# Patient Record
Sex: FEMALE | Race: WHITE | Employment: PART TIME | URBAN - METROPOLITAN AREA
[De-identification: names, ages, dates, MRNs, and addresses within clinical notes are randomized per-mention and may not be internally consistent; named-entity substitution may affect disease eponyms.]

---

## 2017-01-03 ENCOUNTER — DOCUMENTATION ONLY (OUTPATIENT)
Dept: BARIATRICS/WEIGHT MGMT | Age: 32
End: 2017-01-03

## 2017-01-03 ENCOUNTER — HOSPITAL ENCOUNTER (OUTPATIENT)
Dept: BARIATRICS/WEIGHT MGMT | Age: 32
Discharge: HOME | End: 2017-01-03

## 2017-01-03 NOTE — PROGRESS NOTES
[address] [place] [address] [facility], [address]    Patient's Name: Albania Rhoades   Age: 36 y.o. YOB: 1985   Sex: female    Date:   1/3/2017    Insurance:            Session: 1 of  3  Revision:   Surgeon:  Dr. Andrew Aguirre    Height: 5 f 9.5 Weight:    335      Lbs. BMI: 49.6   Pounds Lost since last month: 0               Pounds Gained since last month: 0    Starting Weight: 335   Previous Months Weight: 335  Overall Pounds Lost: 0 Overall Pounds Gained: 0      Do you smoke? None    Alcohol intake:  Number of drinks at a time:  None  Number of times a week: None    Class Guidelines    Patient understands that weight loss trial classes must be consecutive. Patient understands if they miss a class, it is their responsibility to contact me to reschedule class. Patient understands the expectations that weight maintenance/weight loss is expected during the classes. Failure to demonstrate changes may result in one extra month of weight loss trial, followed by going back to see the surgeon. Other Pertinent Information: Insurance does not require a WLT, but due to her history of smoking, she will be seeing me for 3 months. Eating Habits and Behaviors      Today we spent some time talking about the key diet principles. I have talked with patient about what their plate should be made up of. Their plate should be made up of 1/2 coming from non-starchy vegetables, 1/4 coming from lean meat, and 1/4 of their plate coming from carbohydrates, including fruits, starches, or milk. I have talked with patient about counting their carbohydrates and aiming between  grams per day or less than 30 grams of carbohydrates per meal.  We talked about working towards 64 ounces of fluid. Fluid choices should be non-caloric, non-carbonated, no caffeine. We also reviewed protein drinks that would be acceptable.       Patient's current diet habits include: 3 meals a day. States she is snacking on veggies and fruit. She states she eats bread, pasta, and chips on a weekly basis. She is drinking 1 can of soda per day. She states she does not eat cookies, ice cream, cake, donuts. She tends to skip meals, which was addressed and ideas for meals were discussed. Physical Activity/Exercise    Comments:  During class, I discussed with patient the importance of getting into an exercise routine. We talked about how strength training can help one's metabolism  Currently, patient is walking around neighborhood for activity. Goals have been set. Behavior Modification       Comments: We spent some time talking about behaviors. Poor behaviors can also lead to weight gain. These behaviors include watching TV while eating, skipping meals, and eating late. I have encouraged patient to keep a food journal to monitor their intake. Other Behavior Habits of patients: Grazing and late night eating      Being that it is a New Year, patient was shown a power point on assessing their readiness to change in class. Patient was then instructed to calculate their score to determine their level of readiness for charge. Patient was educated the importance of setting SMART goals. Goals should be specific, measurable, and attainable. They should also be realistic and time-orientated.     Patient was encouraged to set a food, behavior, and exercise goal.    Patient's goals are as followed:   Food-related goal:  Cut out cokes completely  Behavior-related goal: No night time junk meals  Exercise-related goal: Walk 30 minutes, 3 x a week      Natalie Colvin RD  1/3/2017

## 2017-02-01 ENCOUNTER — DOCUMENTATION ONLY (OUTPATIENT)
Dept: BARIATRICS/WEIGHT MGMT | Age: 32
End: 2017-02-01

## 2017-02-14 ENCOUNTER — DOCUMENTATION ONLY (OUTPATIENT)
Dept: BARIATRICS/WEIGHT MGMT | Age: 32
End: 2017-02-14

## 2017-02-14 NOTE — PROGRESS NOTES
2/14/17:  Patient did not show for her weight loss trial.  This is her 3rd no show since December. She was not contacted to be rescheduled.     Bruna Jaeger MS RD

## 2017-02-15 ENCOUNTER — DOCUMENTATION ONLY (OUTPATIENT)
Dept: BARIATRICS/WEIGHT MGMT | Age: 32
End: 2017-02-15

## 2017-02-15 NOTE — PROGRESS NOTES
2/15/17:  Patient did call to reschedule her missed appointment on 2/14/17. The call was left on the main line for me and wasn't transferred to me until the next day. Patient was not a no show for this visit.     Alaina Reyes MS RD

## 2017-02-24 ENCOUNTER — HOSPITAL ENCOUNTER (OUTPATIENT)
Dept: BARIATRICS/WEIGHT MGMT | Age: 32
Discharge: HOME | End: 2017-02-24

## 2017-02-24 ENCOUNTER — DOCUMENTATION ONLY (OUTPATIENT)
Dept: BARIATRICS/WEIGHT MGMT | Age: 32
End: 2017-02-24

## 2017-02-24 NOTE — PROGRESS NOTES
[address] [place] [address] [facility], [address]    Patient's Name: Iker Guerra   Age: 36 y.o. YOB: 1985   Sex: female    Date:   2/24/2017    Insurance:            Session: 2 of  3  Revision:   Surgeon:  Dr. Stephen Levy    Height: 5  f 9.5 Weight:    328      Lbs. BMI: 47.9   Pounds Lost since last month: 7               Pounds Gained since last month: 0    Starting Weight: 335   Previous Months Weight: 335  Overall Pounds Lost: 7 Overall Pounds Gained: 0      Do you smoke? None    Alcohol intake:  Number of drinks at a time:  None  Number of times a week: None    Class Guidelines    Patient understands that weight loss trial classes must be consecutive. Patient understands if they miss a class, it is their responsibility to contact me to reschedule class. Patient understands the expectations that weight maintenance/weight loss is expected during the classes. Failure to demonstrate changes may result in one extra month of weight loss trial, followed by going back to see the surgeon. Other Pertinent Information:     Changes Made since last month: Portion control and carbohydrate watching      Eating Habits and Behaviors      Today we spent some time talking about the key diet principles. Patient understands the importance of fluid post-op. I have encouraged patient to start working towards 64 ounces of fluid per day. Fluids should be non-carbonated, no caffeine, and no calorie drinks. Patient was recommended to keep their daily carbohydrates less than 100 grams per day. Patient was given examples of carbohydrate-based food. I also provided patient with a log and have encouraged patient to track their daily carbohydrate intake. In class, we talked about appropriate snacks. Snacks should be protein-based.   Patient was given a handout that had multiple types of fruits on it and indicated what the portion would be, which would be equal to 15-20 grams of carbohydrates based on that portion. Patient was also given a handout for nuts and how many nuts are equal to 100 calories. Discussed with patient that nuts can be an appropriate snack; however, 14 almonds or 11 cashews are equal to 100 calories, so it is important to practice portion control. We also discussed appropriate protein drinks. Patient understands that will be part of their diet after surgery and is encouraged to start trying these supplements. Patient's current diet habits include: 3 meals a day. States she does some of her snacking at work. She is eating bread 1 x a day and chips 1 x a week. She feels she is struggling most with snacking, but is working on making better choices. She is drinking 8 ounces of sweet tea a week, but is drinking a lot of caffeine and was encouraged to start weaning from this immediately. Physical Activity/Exercise    Comments:  During class, I discussed with patient the importance of getting into an exercise routine. We talked about how strength training can help one's metabolism  Currently, patient is toting feed bags up and down her driveway for activity. Goals have been set. Behavior Modification       Comments:   Reinforced to patient some of the behavior modifications that need to be made in order to be successful long term. Patient needs to plan ahead. Lack on planning leads to poor food choices, skipping meals, increase in fast food. I have discussed with patient carrying a small cooler or lunch box with them to help keep healthy snacks on hand. Patient was also encouraged to eat meals at a table and avoid eating in front of the TV. Behavior Habit: Patient states she used to do a lot of snacking in front of the TV, but has stopped that.     Renard Huber Tye 87 RD  2/24/2017

## 2017-03-06 ENCOUNTER — TELEPHONE (OUTPATIENT)
Dept: SURGERY | Age: 32
End: 2017-03-06

## 2017-03-14 ENCOUNTER — TELEPHONE (OUTPATIENT)
Dept: SURGERY | Age: 32
End: 2017-03-14

## 2017-03-14 NOTE — TELEPHONE ENCOUNTER
Called pt to see when she plans to get her labs and nicotine test done. Pt states she can not go before next Friday. She states she had been missing appointments because she was ill and just purchased a new home and simply has a lot going on. She rescheduled her 03/17/17 appointment to 04/05/17. She states she has an appointment with Hoa Hedrick coming up this month that she plans to keep.

## 2017-03-24 ENCOUNTER — TELEPHONE (OUTPATIENT)
Dept: SURGERY | Age: 32
End: 2017-03-24

## 2017-03-24 NOTE — TELEPHONE ENCOUNTER
Called pt to see why she didn't get her labs and nicotine test done today (see note from 03/14/17) She states she has strep throat her kids are sick as well and that she will not be going out in the weather while her and her family are sick. I asked her if she had a follow up appointment with Dr Lonny Salter. She said she didn't need to go back I explained to her she was pending and needed to call and make sure she didn't need a follow up. Then she remembered her other psychiatrist had to fax Dr. Lonny Salter something. She says she will work on it if she can but right now she is sick. She states she has an appointment with Andrews Burt next week that she doesn't plan on missing and she will get her lab work then.

## 2017-03-31 ENCOUNTER — DOCUMENTATION ONLY (OUTPATIENT)
Dept: BARIATRICS/WEIGHT MGMT | Age: 32
End: 2017-03-31

## 2017-03-31 NOTE — PROGRESS NOTES
3/31/17:  Patient contacted me to reschedule her nutrition appointment. She rescheduled last Friday because her children were sick and stated she has to head to [place] today for a family emergency. She has been rescheduled for April 21. She was a no show 3 x throughout the trial and rescheduled at least 3 x. Today's visit would have been her final weight loss trial visit.     Jose Milner MS RD

## 2017-04-21 ENCOUNTER — HOSPITAL ENCOUNTER (OUTPATIENT)
Dept: BARIATRICS/WEIGHT MGMT | Age: 32
Discharge: HOME | End: 2017-04-21

## 2017-04-21 ENCOUNTER — DOCUMENTATION ONLY (OUTPATIENT)
Dept: BARIATRICS/WEIGHT MGMT | Age: 32
End: 2017-04-21

## 2017-04-21 NOTE — PROGRESS NOTES
Nutrition Evaluation    Patient's Name: Mandy Mayers   Age: 36 y.o. YOB: 1985   Sex: female    Height: 5 f 9.5 Weight: 322 BMI:  46.9  Starting Weight:  335      Patient has completed a 3 month weight loss trial.  During the classes, we have focused on 3 components including diet, exercise, and behavior modifications. Upon completion of the weight loss trial, patient had a good understanding of the 3 components. The diet component of the weight loss trial emphasized on:   Label reading and keeping total fat and sugar less than 3 grams per serving    Proper portion sizes    Drinking 64 ounces of water per day   Cutting out simple sugar    The exercise component of the weight loss trial emphasized on:   The importance of getting into an exercise routine.  Ideas and goals for exercise were discussed with the patient. The behavior component of the weight loss trial emphasized on:   Taking 20-30 minutes to eat a meal.   Planning ahead to avoid making poor dietary choices.  Not eating in front of the TV or computer    Smoking Status:  Yes  Alcohol Intake:  Number of Drinks at a Time: None  Number of Times a Week: None    Changes made during classes include:  No soda  Watching her carbohydrate intake. Reading labels (althought there was some misunderstanding) and we spent some time on label reading. Two things that patient learned during this weight loss trial:  Label reading  Portion sizes    Summary:  I feel that Mandy Mayers has demonstrated appropriate diet changes and is ready to move forward with surgery. Patient has been briefed on the importance of the protein drinks, vitamins, and the transition of the diet stages. Patient understands that the long-term diet will focus on protein and vegetables. Patient understand the effects of carbohydrates after surgery and what reactive hypoglycemia is.       Patient is aware that they will be attending pre-op class 2 weeks before surgery and will get more detailed information on the post-op diet guidelines. Patient will see me again at 6 weeks post-op. Patient completed her 3 month weight loss trial.  She missed a few appointments and rescheduled several times. She stated that her  is deployed for 7 months, they just bought a house, and she's taking care of the two children on her own, which is why she has missed so many appointments. Patient does have a good understanding of post-op diet care. She did lose 13 pounds during her weight loss trial with me, so from a nutritional standpoint, I feel she is a good candidate; however, patient is still smoking. She stated she had quit, but with all the stress, she smoked on her way here today. I discussed the effects of smoking after surgery and told her that when she is ready to quit, she can go have her nicotine and other labs completed, but surgery will be delayed 3 months after she quits. Discussed with patient that due to all the stress she has going on right now, this may not be the best time for her to have surgery and she may want to consider getting things in order before moving forward.     Candidate for surgery: Pending (okay from a nutritional standpoint, pending due to smoking)  Re-evaluation Date:     Procedure:  Gastric Bypasss    Renard Miranda 87 RD  4/21/2017

## 2017-04-21 NOTE — PROGRESS NOTES
[address] [place] [address] [facility], [address]    Patient's Name: Jaclyn Rojas   Age: 36 y.o. YOB: 1985   Sex: female    Date:   4/21/2017    Insurance:            Session: 3 of  3  Revision:   Surgeon:  Dr. Bg Corley    Height: 5 f 9.5 Weight:    322      Lbs. BMI: 46.9   Pounds Lost since last month: 6               Pounds Gained since last month: 0    Starting Weight: 335   Previous Months Weight: 325  Overall Pounds Lost: 13 Overall Pounds Gained: 0      Do you smoke? Yes. Patient states she hasn't smoked in a while, but is stressed out and had a cigarette today. Alcohol intake:  Number of drinks at a time:  None  Number of times a week: None    Class Guidelines    Patient understands that weight loss trial classes must be consecutive. Patient understands if they miss a class, it is their responsibility to contact me to reschedule class. Patient understands the expectations that weight maintenance/weight loss is expected during the classes. Failure to demonstrate changes may result in one extra month of weight loss trial, followed by going back to see the surgeon. Other Pertinent Information:     Changes made to diet since last month: Patient states she has completely knocked out soda and fried food. She has cut back on her bread intake. Eating Habits and Behaviors    During class, we focused on the normal key diet principles. We talked about the importance of not drinking liquid calories and aiming for 64 ounces of water per day. We also talked about cutting out carbohydrates. I discussed with patient that the average American consumes 400-500 grams of carbohydrates per day. Patient was instructed to cut out carbohydrates and aim for 100 grams or less. Today's lesson focused a lot on label reading. I first gave patient a power point on label reading.   Throughout the power point, there were questions, such as how many calories are in 2 servings of a certain product. I then compared sugar free cookies to regular cookies for the patient. The point of this activity was to demonstrate to them that sugar free cookies and other sugar free products do not mean calorie-free or carbohydrate-free and these foods should be avoid. We also looked at a box of whole wheat pasta and although it is whole wheat, it still has 42 grams of carbohydrates per 2 ounces. I have reinforced to patient to cut out breads, rice, pasta, cereal, and crackers. Patient's current diet habits include: Patient states she is eating 3 meals a day. She states for breakfast she may do some fruit. Lunch is deli meat and fruit or vegetables. Zorita Star is some type of protein and vegetables. She states if she doesn't eat protein at dinner, she is hungry at 2 am.  If she has to get up at 2 am, she will have fruit or leftover chicken. She states she used to go and grab whatever she can get her hands on, hot dog, potato chips. She states she is trying to make better choices with this. We spent some time talking about her fruit intake and she feels that is something she will be able to stop and states she is not a big candy eater. Patient is drinking 100 ounces + of fluid. Patient states she has cut out the sweet tea and soda. Patient states she has been reading the labels and trying to focus more on low carbohydrates and higher protein choices. She states she has started taking the vitamins already to help get into the routine. Physical Activity/Exercise    Comments:  During class, I discussed with patient the importance of getting into an exercise routine. Patient was encouraged to purchase a pedometer to track steps. Patient is currently grabbing a 50 pound feed bag and walk up and down her driveway for activity. Behavior Modification       Comments: In class, we focused on behavior principles.   Many patients are not eating 3 meals a day. Some patients aren't eating 3 meals per day because of time, others are not eating it because they are not hungry. Talked with patient that breakfast stands for Break the Fast and it is essential that they get something on their system within 1 hour of waking up. Breakfast should focus on protein. This is also important to get the metabolism going. I have also talked to patient about limiting the places that they eat. All meals and snacks are encouraged to be consumed at a table.       Crow Pacheco MS RD  4/21/2017

## 2017-04-24 ENCOUNTER — TELEPHONE (OUTPATIENT)
Dept: SURGERY | Age: 32
End: 2017-04-24

## 2017-04-24 NOTE — TELEPHONE ENCOUNTER
Pt stated that she is very busy and she will call us back when she is able to schedule an appointment with Dr. Chinedu Caldwell.

## 2017-05-08 ENCOUNTER — HOSPITAL ENCOUNTER (OUTPATIENT)
Dept: LAB | Age: 32
Discharge: HOME | End: 2017-05-08
Payer: OTHER GOVERNMENT

## 2017-05-08 LAB
25(OH)D3 SERPL-MCNC: 21.9 NG/ML (ref 30–100)
ALBUMIN SERPL BCP-MCNC: 3.2 G/DL (ref 3.4–5)
ALBUMIN/GLOB SERPL: 1 {RATIO} (ref 0.8–1.7)
ALP SERPL-CCNC: 73 U/L (ref 45–117)
ALT SERPL-CCNC: 22 U/L (ref 13–56)
ANION GAP BLD CALC-SCNC: 6 MMOL/L (ref 3–18)
AST SERPL W P-5'-P-CCNC: 13 U/L (ref 15–37)
BASOPHILS # BLD AUTO: 0 K/UL (ref 0–0.1)
BASOPHILS # BLD: 0 % (ref 0–2)
BILIRUB SERPL-MCNC: 0.3 MG/DL (ref 0.2–1)
BUN SERPL-MCNC: 15 MG/DL (ref 7–18)
BUN/CREAT SERPL: 21 (ref 12–20)
CALCIUM SERPL-MCNC: 8.5 MG/DL (ref 8.5–10.1)
CHLORIDE SERPL-SCNC: 107 MMOL/L (ref 100–108)
CHOLEST SERPL-MCNC: 210 MG/DL
CO2 SERPL-SCNC: 27 MMOL/L (ref 21–32)
CREAT SERPL-MCNC: 0.7 MG/DL (ref 0.6–1.3)
DIFFERENTIAL METHOD BLD: NORMAL
EOSINOPHIL # BLD: 0.2 K/UL (ref 0–0.4)
EOSINOPHIL NFR BLD: 2 % (ref 0–5)
ERYTHROCYTE [DISTWIDTH] IN BLOOD BY AUTOMATED COUNT: 13.8 % (ref 11.6–14.5)
FERRITIN SERPL-MCNC: 67 NG/ML (ref 8–388)
FOLATE SERPL-MCNC: 11.3 NG/ML (ref 3.1–17.5)
GLOBULIN SER CALC-MCNC: 3.1 G/DL (ref 2–4)
GLUCOSE SERPL-MCNC: 100 MG/DL (ref 74–99)
HBA1C MFR BLD: 5.6 % (ref 4.2–5.6)
HCT VFR BLD AUTO: 38.8 % (ref 35–45)
HDLC SERPL-MCNC: 44 MG/DL (ref 40–60)
HDLC SERPL: 4.8 {RATIO} (ref 0–5)
HGB BLD-MCNC: 12.8 G/DL (ref 12–16)
IRON SERPL-MCNC: 72 UG/DL (ref 50–175)
LDLC SERPL CALC-MCNC: 136.4 MG/DL (ref 0–100)
LIPID PROFILE,FLP: ABNORMAL
LYMPHOCYTES # BLD AUTO: 32 % (ref 21–52)
LYMPHOCYTES # BLD: 2.8 K/UL (ref 0.9–3.6)
MCH RBC QN AUTO: 29.8 PG (ref 24–34)
MCHC RBC AUTO-ENTMCNC: 33 G/DL (ref 31–37)
MCV RBC AUTO: 90.2 FL (ref 74–97)
MONOCYTES # BLD: 0.4 K/UL (ref 0.05–1.2)
MONOCYTES NFR BLD AUTO: 5 % (ref 3–10)
NEUTS SEG # BLD: 5.4 K/UL (ref 1.8–8)
NEUTS SEG NFR BLD AUTO: 61 % (ref 40–73)
PLATELET # BLD AUTO: 277 K/UL (ref 135–420)
PMV BLD AUTO: 11.7 FL (ref 9.2–11.8)
POTASSIUM SERPL-SCNC: 4.1 MMOL/L (ref 3.5–5.5)
PROT SERPL-MCNC: 6.3 G/DL (ref 6.4–8.2)
RBC # BLD AUTO: 4.3 M/UL (ref 4.2–5.3)
SODIUM SERPL-SCNC: 140 MMOL/L (ref 136–145)
TRIGL SERPL-MCNC: 148 MG/DL (ref ?–150)
TSH SERPL DL<=0.05 MIU/L-ACNC: 1.23 UIU/ML (ref 0.36–3.74)
VIT B12 SERPL-MCNC: 296 PG/ML (ref 211–911)
VLDLC SERPL CALC-MCNC: 29.6 MG/DL
WBC # BLD AUTO: 8.8 K/UL (ref 4.6–13.2)

## 2017-05-08 PROCEDURE — 85025 COMPLETE CBC W/AUTO DIFF WBC: CPT | Performed by: SURGERY

## 2017-05-08 PROCEDURE — 82728 ASSAY OF FERRITIN: CPT | Performed by: SURGERY

## 2017-05-08 PROCEDURE — 84443 ASSAY THYROID STIM HORMONE: CPT | Performed by: SURGERY

## 2017-05-08 PROCEDURE — 83540 ASSAY OF IRON: CPT | Performed by: SURGERY

## 2017-05-08 PROCEDURE — 82607 VITAMIN B-12: CPT | Performed by: SURGERY

## 2017-05-08 PROCEDURE — 86677 HELICOBACTER PYLORI ANTIBODY: CPT | Performed by: SURGERY

## 2017-05-08 PROCEDURE — 80061 LIPID PANEL: CPT | Performed by: SURGERY

## 2017-05-08 PROCEDURE — 84425 ASSAY OF VITAMIN B-1: CPT | Performed by: SURGERY

## 2017-05-08 PROCEDURE — 80053 COMPREHEN METABOLIC PANEL: CPT | Performed by: SURGERY

## 2017-05-08 PROCEDURE — 80323 ALKALOIDS NOS: CPT | Performed by: SURGERY

## 2017-05-08 PROCEDURE — 83036 HEMOGLOBIN GLYCOSYLATED A1C: CPT | Performed by: SURGERY

## 2017-05-08 PROCEDURE — 82306 VITAMIN D 25 HYDROXY: CPT | Performed by: SURGERY

## 2017-05-08 PROCEDURE — 36415 COLL VENOUS BLD VENIPUNCTURE: CPT | Performed by: SURGERY

## 2017-05-09 LAB — H PYLORI IGG SER IA-ACNC: <0.9 U/ML (ref 0–0.8)

## 2017-05-10 LAB
H PYLORI IGM SER-ACNC: <9 UNITS (ref 0–8.9)
VIT B1 BLD-SCNC: 146.1 NMOL/L (ref 66.5–200)

## 2017-05-10 NOTE — PROGRESS NOTES
1. Add hypovitaminosis D to diagnoses  2. Rx 5000 units D3 daily  3.  Needs instruction on better protein nutrition

## 2017-05-11 ENCOUNTER — TELEPHONE (OUTPATIENT)
Dept: SURGERY | Age: 32
End: 2017-05-11

## 2017-05-11 LAB
COTININE SERPL-MCNC: NORMAL NG/ML
NICOTINE SERPL-MCNC: NORMAL NG/ML

## 2017-05-11 NOTE — PROGRESS NOTES
Spoke to patient regarding her Vitamin D level. Patient understood instructions to start 5000 IU per day. Talk to her about her protein/albumin levels. Patient was encouraged to incorporate more lean meat to her diet and she is going to start drinking the Premier shakes now.     Anika Cedeño MS RD

## 2017-06-02 ENCOUNTER — OFFICE VISIT (OUTPATIENT)
Dept: SURGERY | Age: 32
End: 2017-06-02

## 2017-06-02 VITALS
DIASTOLIC BLOOD PRESSURE: 78 MMHG | HEART RATE: 85 BPM | TEMPERATURE: 97.8 F | BODY MASS INDEX: 43.4 KG/M2 | RESPIRATION RATE: 20 BRPM | WEIGHT: 293 LBS | SYSTOLIC BLOOD PRESSURE: 136 MMHG | HEIGHT: 69 IN

## 2017-06-02 DIAGNOSIS — E66.01 MORBID OBESITY DUE TO EXCESS CALORIES (HCC): Primary | ICD-10-CM

## 2017-06-02 RX ORDER — GLUCOSAMINE SULFATE 1500 MG
5000 POWDER IN PACKET (EA) ORAL DAILY
COMMUNITY

## 2017-06-02 RX ORDER — ZOLPIDEM TARTRATE 10 MG/1
TABLET ORAL
Refills: 2 | COMMUNITY
Start: 2017-05-22 | End: 2017-07-12

## 2017-06-02 RX ORDER — METHYLPREDNISOLONE 8 MG/1
TABLET ORAL
Refills: 0 | COMMUNITY
Start: 2017-04-15 | End: 2017-06-02 | Stop reason: ALTCHOICE

## 2017-06-02 RX ORDER — LIDOCAINE HYDROCHLORIDE 20 MG/ML
SOLUTION ORAL; TOPICAL
COMMUNITY
Start: 2017-05-31 | End: 2017-06-02 | Stop reason: ALTCHOICE

## 2017-06-02 RX ORDER — VENLAFAXINE 75 MG/1
TABLET ORAL
Refills: 0 | COMMUNITY
Start: 2017-02-28 | End: 2017-06-02 | Stop reason: ALTCHOICE

## 2017-06-02 RX ORDER — ALBUTEROL SULFATE 90 UG/1
AEROSOL, METERED RESPIRATORY (INHALATION)
Refills: 11 | COMMUNITY
Start: 2017-03-24 | End: 2017-06-02 | Stop reason: ALTCHOICE

## 2017-06-02 RX ORDER — DIAZEPAM 5 MG/1
TABLET ORAL
COMMUNITY
Start: 2017-05-25 | End: 2017-06-02 | Stop reason: ALTCHOICE

## 2017-06-02 RX ORDER — NITROFURANTOIN 25; 75 MG/1; MG/1
CAPSULE ORAL
Refills: 0 | COMMUNITY
Start: 2017-05-08 | End: 2017-06-02 | Stop reason: ALTCHOICE

## 2017-06-02 RX ORDER — AZITHROMYCIN 250 MG/1
TABLET, FILM COATED ORAL
COMMUNITY
Start: 2017-05-30 | End: 2017-06-02 | Stop reason: ALTCHOICE

## 2017-06-02 RX ORDER — MONTELUKAST SODIUM 10 MG/1
TABLET ORAL
Refills: 11 | COMMUNITY
Start: 2017-03-24 | End: 2017-06-29 | Stop reason: ALTCHOICE

## 2017-06-02 RX ORDER — TRIAMCINOLONE ACETONIDE 1 MG/G
CREAM TOPICAL
Refills: 0 | COMMUNITY
Start: 2017-04-15 | End: 2017-06-02 | Stop reason: ALTCHOICE

## 2017-06-02 RX ORDER — FLUTICASONE PROPIONATE 50 MCG
SPRAY, SUSPENSION (ML) NASAL
Refills: 11 | COMMUNITY
Start: 2017-03-24 | End: 2017-06-29 | Stop reason: ALTCHOICE

## 2017-06-02 RX ORDER — HYDROCODONE POLISTIREX AND CHLORPHENIRAMINE POLISTIREX 10; 8 MG/5ML; MG/5ML
SUSPENSION, EXTENDED RELEASE ORAL
Refills: 0 | COMMUNITY
Start: 2017-02-27 | End: 2017-06-02 | Stop reason: ALTCHOICE

## 2017-06-02 RX ORDER — ALPRAZOLAM 2 MG/1
TABLET ORAL
Refills: 2 | COMMUNITY
Start: 2017-05-22

## 2017-06-02 RX ORDER — TRAMADOL HYDROCHLORIDE 50 MG/1
TABLET ORAL
COMMUNITY
Start: 2017-05-25 | End: 2017-06-02 | Stop reason: ALTCHOICE

## 2017-06-02 RX ORDER — NAPROXEN 500 MG/1
TABLET ORAL
Refills: 0 | COMMUNITY
Start: 2017-04-12 | End: 2017-06-02 | Stop reason: ALTCHOICE

## 2017-06-02 RX ORDER — CETIRIZINE HCL 10 MG
TABLET ORAL
Refills: 11 | COMMUNITY
Start: 2017-03-24 | End: 2017-06-28 | Stop reason: CLARIF

## 2017-06-02 RX ORDER — BUPROPION HYDROCHLORIDE 150 MG/1
TABLET, EXTENDED RELEASE ORAL
Refills: 2 | COMMUNITY
Start: 2017-05-22 | End: 2017-06-02 | Stop reason: ALTCHOICE

## 2017-06-02 RX ORDER — VENLAFAXINE HYDROCHLORIDE 225 MG/1
TABLET, EXTENDED RELEASE ORAL
Refills: 2 | COMMUNITY
Start: 2017-05-22 | End: 2017-06-29 | Stop reason: ALTCHOICE

## 2017-06-02 NOTE — PROGRESS NOTES
Atanacio Pallas is a 36 y.o. female who presents today with   Chief Complaint   Patient presents with    Morbid Obesity     Pt presents today to discuss surgical options        Body mass index is 47.26 kg/(m^2). 1. Have you been to the ER, urgent care clinic since your last visit? Hospitalized since your last visit? No    2. Have you seen or consulted any other health care providers outside of the [address] [place] [address] since your last visit? Include any pap smears or colon screening.  No

## 2017-06-02 NOTE — PROGRESS NOTES
Bariatric Midtrial   Returns during WLT to discuss status. Having no issues. Making positive changes. Still wants a bypass. She had resumed smoking in April but stopped the first week in April. She has completed her WLT. She lost 15 lbs on the WLT. Past Medical History:   Diagnosis Date    Anxiety     Ill-defined condition     HPV    Plantar fasciitis     Spondylosis     UTI (urinary tract infection)      Past Surgical History:   Procedure Laterality Date    HX APPENDECTOMY      HX  SECTION      HX LIPOMA RESECTION      HX SHOULDER ARTHROSCOPY      cartilage repair    HX TONSILLECTOMY       Allergies   Allergen Reactions    Chantix [Varenicline] Other (comments)     syncope     Current Outpatient Prescriptions   Medication Sig Dispense Refill    ALPRAZolam (XANAX) 2 mg tablet TK 1 T PO BID PRF ANXIETY  2    cetirizine (ZYRTEC) 10 mg tablet TK 1 T PO QD  11    fluticasone (FLONASE) 50 mcg/actuation nasal spray SHAKE LQ AND U 1 SPR IEN D  11    montelukast (SINGULAIR) 10 mg tablet TK 1 T PO D FOR ALLERGIES  11    venlafaxine 225 mg tr24 TK 1 T PO QD  2    zolpidem (AMBIEN) 10 mg tablet TK 1 T PO QHS PRF SLEEP  2    cholecalciferol (VITAMIN D3) 1,000 unit cap Take  by mouth daily.  gabapentin (NEURONTIN) 800 mg tablet Take  by mouth three (3) times daily.        Social History     Social History    Marital status: UNKNOWN     Spouse name: N/A    Number of children: N/A    Years of education: N/A     Social History Main Topics    Smoking status: Current Every Day Smoker     Packs/day: 0.25     Types: Cigarettes    Smokeless tobacco: None    Alcohol use Yes      Comment: rarely    Drug use: No    Sexual activity: Yes     Partners: Male     Birth control/ protection: None     Other Topics Concern    None     Social History Narrative     Family History   Problem Relation Age of Onset    No Known Problems Mother     Diabetes Father     Hypertension Father      Family Status Relation Status    Mother Alive    Father Alive       No change in ROS     Visit Vitals    /78 (BP 1 Location: Right arm, BP Patient Position: At rest)    Pulse 85    Temp 97.8 °F (36.6 °C) (Oral)    Resp 20    Ht 5' 9\" (1.753 m)    Wt 145.2 kg (320 lb)    BMI 47.26 kg/m2       Pulm: CTA   CV: RRR   Abd: soft, nontender, easily palp costal margin and  no hernias     Labs: low vit D and low albumin treated   EKG - NSR  Psych- PENDING  Nutr - 3/3 complete  Imp:   Doing well except a need to remain tobacco free for 3 months. Repeat nicotine end of June, obtain clearance from psych and plan for surgery in July.

## 2017-06-02 NOTE — MR AVS SNAPSHOT
Visit Information Date & Time Provider Department Dept. Phone Encounter #  
 6/2/2017 11:15 AM MD [facility] [phone] [id number] Your Appointments 6/29/2017  9:45 AM  
PRE OP with MD [facility]) Appt Note: pre op visit pt will schedule at this visit [address] [facility] [place] [address] [id number] [address] 851 [facility] 200 [facility] Upcoming Health Maintenance Date Due Pneumococcal 19-64 Medium Risk (1 of 1 - PPSV23) 8/6/2006 DTaP/Tdap/Td series (1 - Tdap) 8/6/2008 PAP AKA CERVICAL CYTOLOGY 8/6/2008 INFLUENZA AGE 9 TO ADULT 8/1/2017 Allergies as of 6/2/2017  Review Complete On: 6/2/2017 By: Na Vu Severity Noted Reaction Type Reactions Chantix [Varenicline]  01/10/2015    Other (comments)  
 syncope Current Immunizations  Never Reviewed No immunizations on file. Not reviewed this visit You Were Diagnosed With   
  
 Codes Comments Morbid obesity due to excess calories ([facility]    -  Primary ICD-10-CM: E66.01 
ICD-9-CM: 278.01   
 BMI 45.0-49.9, adult (HCC)     ICD-10-CM: Q72.10 
ICD-9-CM: V85.42 Vitals BP Pulse Temp Resp Height(growth percentile) Weight(growth percentile) 136/78 (BP 1 Location: Right arm, BP Patient Position: At rest) 85 97.8 °F (36.6 °C) (Oral) 20 5' 9\" (1.753 m) 320 lb (145.2 kg) BMI OB Status Smoking Status 47.26 kg/m2 Implant Current Every Day Smoker BMI and BSA Data Body Mass Index Body Surface Area  
 47.26 kg/m 2 2.66 m 2 Your Updated Medication List  
  
   
This list is accurate as of: 6/2/17 11:44 AM.  Always use your most recent med list.  
  
  
  
  
 ALPRAZolam 2 mg tablet Commonly known as:  Kristyn BAIRD 1 T PO BID PRF ANXIETY  
  
 cetirizine 10 mg tablet Commonly known as: ZYRTEC TK 1 T PO QD  
  
 fluticasone 50 mcg/actuation nasal spray Commonly known as:  Rachel Evans SHAKE LQ AND U 1 SPR IEN D  
  
 gabapentin 800 mg tablet Commonly known as:  NEURONTIN Take  by mouth three (3) times daily. montelukast 10 mg tablet Commonly known as:  SINGULAIR TK 1 T PO D FOR ALLERGIES  
  
 venlafaxine 225 mg Tr24 TK 1 T PO QD  
  
 VITAMIN D3 1,000 unit Cap Generic drug:  cholecalciferol Take  by mouth daily. zolpidem 10 mg tablet Commonly known as:  AMBIEN  
TK 1 T PO QHS PRF SLEEP To-Do List   
 06/26/2017 Lab:  NICOTINE AND METABOLITE, QT SERUM, PLASMA, WHOLE BLOOD Introducing [facility]! Priyank Bowen introduces [employer]form[employer] patient portal. Now you can access parts of your medical record, email your doctor's office, and request medication refills online. 1. In your internet browser, go to [email or web address] 2. Click on the First Time User? Click Here link in the Sign In box. You will see the New Member Sign Up page. 3. Enter your Conformity Access Code exactly as it appears below. You will not need to use this code after youve completed the sign-up process. If you do not sign up before the expiration date, you must request a new code. · [employer] Access Code: [email or web address] Expires: 8/6/2017  1:04 PM 
 
4. Enter the last four digits of your Social Security Number (xxxx) and Date of Birth (mm/dd/yyyy) as indicated and click Submit. You will be taken to the next sign-up page. 5. Create a Conformity ID. This will be your Conformity login ID and cannot be changed, so think of one that is secure and easy to remember. 6. Create a Conformity password. You can change your password at any time. 7. Enter your Password Reset Question and Answer. This can be used at a later time if you forget your password. 8. Enter your e-mail address. You will receive e-mail notification when new information is available in [address] 9. Click Sign Up. You can now view and download portions of your medical record. 10. Click the Download Summary menu link to download a portable copy of your medical information. If you have questions, please visit the Frequently Asked Questions section of the [employer] website. Remember, [employer] is NOT to be used for urgent needs. For medical emergencies, dial 911. Now available from your iPhone and Android! Please provide this summary of care documentation to your next provider. Your primary care clinician is listed as AAKASH WARREN. If you have any questions after today's visit, please call [phone].

## 2017-06-02 NOTE — LETTER
2017 11:37 AM 
 
Patient:  Jesica Whitt YOB: 1985 Date of Visit: 2017 Filipe Moreno MD 
[facility] [place] [address] VIA Facsimile: [phone] Dear Filipe Moreno MD, Thank you for referring Ms. Jesica Whitt to [place] [address] for evaluation and treatment. Below are the relevant portions of my assessment and plan of care. Bariatric Midtrial  
Returns during WLT to discuss status. Having no issues. Making positive changes. Still wants a bypass. She had resumed smoking in April but stopped the first week in April. She has completed her WLT. She lost 15 lbs on the WLT. Past Medical History:  
Diagnosis Date  Anxiety  Ill-defined condition HPV  Plantar fasciitis  Spondylosis  UTI (urinary tract infection) Past Surgical History:  
Procedure Laterality Date  HX APPENDECTOMY  HX  SECTION    
 HX LIPOMA RESECTION    
 HX SHOULDER ARTHROSCOPY    
 cartilage repair  HX TONSILLECTOMY Allergies Allergen Reactions  Chantix [Varenicline] Other (comments)  
  syncope Current Outpatient Prescriptions Medication Sig Dispense Refill  ALPRAZolam (XANAX) 2 mg tablet TK 1 T PO BID PRF ANXIETY  2  
 cetirizine (ZYRTEC) 10 mg tablet TK 1 T PO QD  11  
 fluticasone (FLONASE) 50 mcg/actuation nasal spray SHAKE LQ AND U 1 SPR IEN D  11  
 montelukast (SINGULAIR) 10 mg tablet TK 1 T PO D FOR ALLERGIES  11  
 venlafaxine 225 mg tr24 TK 1 T PO QD  2  
 zolpidem (AMBIEN) 10 mg tablet TK 1 T PO QHS PRF SLEEP  2  
 cholecalciferol (VITAMIN D3) 1,000 unit cap Take  by mouth daily.  gabapentin (NEURONTIN) 800 mg tablet Take  by mouth three (3) times daily. Social History Social History  Marital status: UNKNOWN Spouse name: N/A  
 Number of children: N/A  
 Years of education: N/A Social History Main Topics  Smoking status: Current Every Day Smoker Packs/day: 0.25 Types: Cigarettes  Smokeless tobacco: None  Alcohol use Yes Comment: rarely  Drug use: No  
 Sexual activity: Yes  
  Partners: Male Birth control/ protection: None Other Topics Concern  None Social History Narrative Family History Problem Relation Age of Onset  No Known Problems Mother  Diabetes Father  Hypertension Father Family Status Relation Status  Mother Alive  Father Alive No change in ROS Visit Vitals  /78 (BP 1 Location: Right arm, BP Patient Position: At rest)  Pulse 85  Temp 97.8 °F (36.6 °C) (Oral)  Resp 20  
 Ht 5' 9\" (1.753 m)  Wt 145.2 kg (320 lb)  BMI 47.26 kg/m2 Pulm: CTA  
CV: RRR Abd: soft, nontender, easily palp costal margin and  no hernias Labs: low vit D and low albumin treated EKG - NSR Psych- PENDING Nutr - 3/3 complete Imp:  
Doing well except a need to remain tobacco free for 3 months. Repeat nicotine end of June, obtain clearance from psych and plan for surgery in July. Thank you very much for your referral of Ms. Ananth Ulloa. If you have questions, please do not hesitate to call me. I look forward to following Ms. Sandor Velez along with you and will keep you updated as to her progress.   
 
 
 
 
Sincerely, 
 
 
Varun Kelley MD

## 2017-06-26 ENCOUNTER — HOSPITAL ENCOUNTER (OUTPATIENT)
Dept: LAB | Age: 32
Discharge: HOME | End: 2017-06-26
Payer: OTHER GOVERNMENT

## 2017-06-26 DIAGNOSIS — E66.01 MORBID OBESITY DUE TO EXCESS CALORIES (HCC): ICD-10-CM

## 2017-06-26 PROCEDURE — 36415 COLL VENOUS BLD VENIPUNCTURE: CPT | Performed by: SURGERY

## 2017-06-26 PROCEDURE — 80323 ALKALOIDS NOS: CPT | Performed by: SURGERY

## 2017-06-27 ENCOUNTER — DOCUMENTATION ONLY (OUTPATIENT)
Dept: SURGERY | Age: 32
End: 2017-06-27

## 2017-06-27 NOTE — PROGRESS NOTES
7 day Pre-Op LIQUID Diet    Benefits:  o Reducing intake before surgery will shrink your liver by  depleting glycogen. (a form of stored energy)  o Reduced liver size gives better access to stomach during  surgery; which translates to a safer surgery. o Prevents the \"last supper\" syndrome  o Experiencing weight loss before the procedure encourages  post-operative compliance and \"jump-starts\" weight loss    Specifics:  o Start 7 days before surgery:  ___7/4/17_________  o NO SOLID FOODS!!  o Your surgery will be CANCELED if this diet is not followed!!!  o Minimum of 64oz. of fluid daily, including protein drinks.  o No added sugar or carbonated beverages  o Continue to take all your prescribed medication and your vitamin supplements during this preoperative diet phase. CLEAR LIQUIDS:   Water   Sugar free, non-carbonated beverages (crystal light, propel)   Sugar free popsicles   Sugar free Jell-O   Fat free, reduced sodium broth    Decaffeinated coffee or decaffeinated tea with artificial sweeteners. PROTEIN:   60 grams of protein daily (in liquid supplements)   Pre-made protein drinks   Protein powder added to water    3 gram rule: limit sugar and fat to less than 3 grams per 8oz.  4-6 oz. low fat/low sugar yogurt OR cottage cheese 3-4 times during the week      [facility] Gastric Bypass and Sleeve Dietary Progression  Patient Name:   Date of Surgery: 7/4/17     Ice Chips start once admitted on floor. Begin Bariatric Clear Liquid Diet on: 7/5/17    Clear Liquid Diet: 64 oz. of fluid per day  o Low calorie, low sugar, non-carbonated beverages  - Water, Crystal Light, Propel Water, Sugar Free Jell-O, Sugar Free Popsicles, Bouillon  - Start protein supplement during this stage. (60-70 grams per day)  - Getting your fluid in and staying hydrated is your #1 priority! - The clear liquid diet will last for 7 days.       Begin Bariatric Soft and Moist on: 7/12/17  - This stage of the diet will last for 5 week, unless otherwise instructed by your surgeon. - Begin:  1 week post-op   - End:  6 weeks post-op (or when you follow up with the Registered Dietitian)    - Soft, moist, high protein foods: 3 meals per day plus protein supplements. o   Portions should emphasize on soft protein. o Portions will be a MAXIMUM of 1 ounce for solid food and 2-3 ounces for cottage cheese and yogurt. o Protein supplements should be between meals and provide 30-40 grams per day during soft protein diet. o Continue to get 64 ounces of fluid in per day. - Protein foods that would be ok on the Soft and Moist Diet are:  o Slow transition:  o 1st week on soft protein should focus on: Yogurt, cottage cheese, eggs  o 2nd -4th  week on soft protein diet should focus on: yogurt, cottage cheese, eggs, canned tuna, canned chicken, tilapia, fish (needs to be soft enough to be cut up with a fork)  o 5th week on soft protein diet should focus on: Yogurt, cottage cheese, eggs, canned tuna, canned chicken, tilapia, fish, salmon, chicken breast, or turkey. Remember to continue to get 64 ounces of fluid daily on ALL Stages. To be advanced to Bariatric Maintenance Stage of the bariatric diet, follow up with the dietitian 6 weeks post-op, around:        [address]  1. How many ounces of fluid will you need to drink every day after surgery? _______________    2. List 3 examples of bariatric clear liquids. ________________________  ________________________  ________________________  3. What is the 3 gram rule?   ______________________________________________________________      4. What is the 30 minute rule?  ______________________________________________________________      5. What are examples of food you will be able to eat on the bariatric soft and moist diet?  _________________________  _________________________  _________________________  6. After surgery I will be able to use a straw.   TRUE FALSE    7. After surgery I will NOT be able to drink carbonated beverages. TRUE    FALSE  8. After surgery I will be able to drink caffeine. TRUE   FALSE    9. What 4 vitamins will you take after surgery?  ______________________           _________________________  _____________________           _________________________  10. How much exercise should you get daily? _________________    11. How long should it take you to eat a meal? ________________    12. The Calcium I have purchased is: ______________________. This has ________ mg per serving. I will need to take this ________ times a day. 13. The protein drink I have decided on is: ______________. This has _________ grams of protein. I will need to drink ______ of my protein drinks during the full liquid phase and _____ during the soft protein phase. My protein drinks will give me ______ ounces of fluid. 14. After having a sleeve gastrectomy I will not be able to take NSAIDs (Non-Steroidal Anti-inflammatory Drugs) for ______ weeks. 15. After having a gastric bypass I will not be able to take NSAIDs (Non-Steroidal Anti-Inflammatory Drugs) for _____________. Pre-op instructions:  1. Shower with the antibacterial soap provided the 3 days prior to surgery. See instructions for details. 2. Pre-admission testing will contact you 1 week before surgery  3. [facility] will contact you the day before surgery to confirm your surgery time.  Email or call your surgery scheduler if you have not heard from them by 3pm  4. Nothing to eat or drink (NPO) after midnight the night before surgery.  Take any evening medications by 11pm  5. Wear comfortable clothing. 6. Do not bring any valuables. 7. Bring insurance card, prescription card, photo ID and credit card to the hospital.  **Discuss all home medications with your surgeon at you pre-op appointment.   Your surgeon will inform you of what medications to stop before surgery and what medications to take the day of surgery. **STOP all NSAIDS (Ibuprofen, Aleve, Advil, Naprosyn etc.) and Aspirin 7 days before your surgery      Important Information:  1. No lifting over 15 lbs. for 6 weeks post-op  2. No driving for 8-73 days after surgery - must be off pain medication. 3. No smoking EVER again! 4. You will NOT be able to take any Non-Steroidal Anti-inflammatories (NSAIDS), Aspirin or Steroids  a. Bypass/revision patients - EVER again. (Tylenol only)  b. Sleeve patients - 6 weeks after surgery  5. Pulse/temperature- twice daily for 2 weeks post-op   6. Avoid pregnancy for 18 months  7. Key Diet principles:  a. Vitamin/mineral supplements-Geronimo Complete, Calcium citrate, Vitamin D3, Vitamin B12  b.  Protein supplements - 60-70 grams/day  c. Minimum 64 oz. fluid per day      What to Expect in the Hospital:  Pre-op area:  o 2nd floor   o Arrive 2 hours before surgery  o Lovenox (blood thinner)  o Incentive Spirometer  o SCDs  o Sign consent  o Anesthesia  Start IV    Operating Room:    o Gastric bypass: 2- 2 ½ hours  o Sleeve gastrectomy: 1-1 ½ hours  o Revision: 2-3 hours    PACU(Post Anesthesia Care Unit):  o Nasal cannula  o EKG monitor  o Blood pressure cuff  o Pulse Ox  o Hernadez Catheter  o SCDs  o No family allowed  o Minimum one hour      5 [place] (Day of Surgery):  o Private room  o 1-2 oz. ice chips per hour (except Chastanet patients-mouth swabs only)  o IV Dilaudid  or liquid pain medication as needed for pain  o Discontinue hernadez catheter  o Walk in the evening  o One family member can spend the night (must 18 years or older)  o Cell phone/computers - OK    5 [place] (Post-op Day 1/Post-op Day 2):  o 7am - Gastrograffin swallow study (X-ray) for:  o All sleeve gastrectomy patients  o All Dr. Shaan Rooney patients  o All revision patients   o Discontinue -nasal cannula and heart rate monitor  o Start bariatric clear liquid diet - water, crystal light, broth, Jell-O and protein supplement  o Slowly increase to 3 oz. clear liquids and 1 oz. protein supplement per hour  o Oral pain medication as needed for pain - communicate with your nurse  o Goal:  48oz. liquid per day  o Discharge instructions/Lovenox self-injection instructions (if prescribed)  o WALK!!    Post-op Goals:  1. Void within 8 hours of your catheter being removed  2. Pain is well controlled with oral pain medication  3. Nausea is under control/No vomiting  4. Tolerating 3 oz. of clear liquids and 1 oz. protein supplement per hour for 3 consecutive hours. Post-op Follow-up Labs will need to be completed 1-2 weeks BEFORE your 3 month, 6 month and yearly visits. These labs are required and your appointment will be rescheduled if they are not completed. My surgical procedure and post-operative hospital stay has been discussed with me and I have been given the opportunity to ask any questions I may have. Patient Signature: ____________________________  Date: ________    Following Gastric Bypass surgery you will no longer be able to take NSAIDs (Non-Steriodal Anti-Inflammatory Drugs) EVER again. NSAIDs are the leading cause of stomach ulcers following Gastric Bypass surgery. (Patients having the Gastric Sleeve will not be able to take NSAIDs for 6 weeks following surgery.)      MOST COMMONLY TAKEN    NSAIDs    to be AVOIDED!! 1. Ibuprofen  2. Advil  3. Motrin  4. Excedrin  5. Aspirin  6. Celebrex  7. Naproxen  8. Aleve  9. Voltaren  10. Mobic    Attached is an extensive list of additional NSAIDs that cannot be taken following surgery. Please be sure to review this list when you are being prescribed new medications. This list covers the majority of NSAIDs on the market. Be aware that additional NSAIDs do exist and new medications are being introduced regularly. You must be your own advocate!! Non-Steriodal Anti-Inflammatory Drugs (NSAIDs)  ?  The following is a list of NSAIDS that are NEVER to be taken again after Gastric Bypass surgery    Ibuprofen which is also known as : Advil, Advil Childrens, Advil Christ Strength, Advil Liquigel, Advil Migraine, Advil Pediatric, Childrens Ibuprofen Berry, Genpril, IBU, Midol IB, Midol Maximum Strength Cramp Formula, Dolgesic, Motrin Childrens, Motrin IB, Motrin Infant Drops, Motrin Christ Strength, Motrin Migraine Pain, Nuprin, Migraine Liqui-gels, Ibu-Tab 200, Cap-Profen, Tab-Profen, Profen, Ibuprohm, Childrens Elixsure, IB Pro, Vicoprofen, Combunox, A-G Profen, Actiprofen, Addaprin, Advil Infants Concentrated Drops, Caldolor, [place], Q-Profen, Ibifon 600, Ibren, Xavier, Midol Cramps & Bodyaches, [place], Valery, Dawn [place], [place], Uni-Pro, Wal-Profen    Aspirin which has the brand name: Arthritis Pain, Aspergum, Aspir-Low, Aspirin Lite Coat, Crystal Aspirin, Bufferin, Easprin, Ecotrin, Empirin, Fasprin, [place], Halfprin, [place]as Aspirin, [place] Aspirin, Excedrin    Ketoprofen which is known as: Orudis KT, Oruvail, Actron. Sulindac which is sold as: Clinoril. Naproxen is one of the most common NSAIDs, and is sold as: Aleve, Naprosyn, EC-Naprosyn, Naprelan, Anaprox, All Day Pain Relief, Aflaxen, All Day Relief, Anaprox-DS, Comfort Pac  With Naproxen,  Aleve Caplet, Aleve Easy Open Arthritis, Aleve Gelcap,  Anaprox-DS, EC-Naprosyn, Leader Naproxen Sodium, Midol Extended Relief, Naprelan 375?, Naprelan 500?, Naprelan 750?, Prevacid NapraPac 375,  Prevacid NapraPac, Naprapac, Vimovo. Etodolac is sold under the brand name: Lodine XL, Lodine. Fenoprofen can be found on the market as: Nalfon, Nalfon 200. Diclofenac sold as: Arthrotec, Cataflam, Voltaren, Cambia, Voltaren-XR, Zipsor. Flurbiprofen sold as: Asaid. Ketorolac is sold under the brand name: Sprix, Toradol, Toradol IM, Toradol IV/IM. Piroxicam is found on the market as: Feldene. Indomethacin known as: Indocin SR, Indocin, Indo-Rich, Indomethegan, Indocin IV.     Mefenamic Acid sold as: Ponstel. Meloxicam is sold under the brand name: Mobic    Nabumetone which is sold as: Relafen. Oxaprozin which has the brand name: Daypro    Ketoprofen which has the brand name: Actron, Orudis KT, Orudis, Oruvail    Famotidine and Ibuprofen can be found as: Duexis    Meclofenamate sold as: Meclomen    Tolmetin which can be found on the marked as: Tolectin, Tolectin 600, Tolectin DS    Salsalate which is sold as: Disalcid. Celecoxib which is sold as: Celebrex    PRE-OP CHECKLIST    THINGS TO DO AT MY PRE-OP APPOINTMENT WITH MY SURGEON:  ? Discuss ALL my current medications with my surgeon. Know what medications needs to be stopped before surgery AND what medications need to be taken on the morning of surgery. ? blood pressure/diuretic medications. ? Coumadin, Plavix or other blood thinners    ? Stop the following diabetic medications (if applicable): ______________n/a______________________________________on: ______________  ? Use Regular Insulin (Novolog Pen) according to the following sliding scale: Insulin Sliding Scale  Blood sugar:  Amount of insulin:  Under 150  no insulin  150-200  2 units  201-250  4 units  251-300  6 units  301-350  8 units  351-400  10 units  400 or greater 12 units and call physician [phone]    THINGS TO DO FOLLOWING the PRE-OP CLASS:  ? Read through all information given to me by:  ? My dietitian Angie Hwang or [place])  ? Daksha/Patricia at the Pre-op class    ? Contact my insurance company to find out the out of pocket cost of Lovenox (enoxaparin). $____________      THINGS TO DO BEFORE SURGERY:    ? Start the pre-op liquid diet on: ___7/4/17_______    ? Stop all NSAIDS (see attached sheet with list of NSAIDs) and Aspirin 7 days before my surgery: __7/4/17_________  ? Contact my doctor(PCP or surgeon) regarding stopping Coumadin, Plavix or other blood thinners    ?  Purchase bariatric clear liquids (Crystal Lite, sugar free Jell-O, broth, sugar free popsicles, protein supplement) and bariatric soft and moist foods (low fat yogurt, cottage cheese, eggs, tuna, fish, chicken) so that Im prepared when I get home from the hospital.     ? Purchase all vitamins that will be required following surgery. 1. Chewable multivitamin - 2 per day(ex: Flintstones)  2. Calcium Citrate - 1500mg (500mg 3 times a day)  3. Vitamin B12 - 1000mcg daily  4. Vitamin D3 - 5000IU daily    ? Create a system to keep track of how many ounces of fluid I am drinking daily. ? Post-op GOAL: 64oz per day    ? Purchase a protein supplement that I like:  ? Brand: ______________    ? Ounces: __________   ?  Grams of protein per serving: _________

## 2017-06-29 ENCOUNTER — OFFICE VISIT (OUTPATIENT)
Dept: SURGERY | Age: 32
End: 2017-06-29

## 2017-06-29 VITALS
HEART RATE: 79 BPM | HEIGHT: 71 IN | DIASTOLIC BLOOD PRESSURE: 86 MMHG | RESPIRATION RATE: 16 BRPM | SYSTOLIC BLOOD PRESSURE: 124 MMHG | TEMPERATURE: 98.2 F | WEIGHT: 293 LBS | BODY MASS INDEX: 41.02 KG/M2 | OXYGEN SATURATION: 98 %

## 2017-06-29 DIAGNOSIS — E66.01 MORBID OBESITY DUE TO EXCESS CALORIES (HCC): Primary | ICD-10-CM

## 2017-06-29 NOTE — PROGRESS NOTES
Pt ID confirmed    Weight Loss Metrics 6/29/2017 6/29/2017 6/28/2017 6/2/2017 6/2/2017 12/15/2016 9/4/2016   Pre op / Initial Wt 320 - - 320 - - -   Today's Wt - 320 lb 320 lb - 320 lb 335 lb 3.2 oz 345 lb   BMI - 44.63 kg/m2 - - 47.26 kg/m2 48.44 kg/m2 48.12 kg/m2   Ideal Body Wt 152 - - 146 - - -   Excess Body Wt 168 - - 174 - - -   Goal Wt 186 - - 181 - - -   Wt loss to date 0 - - 0 - - -   % Wt Loss 0 - - 0 - - -   80% .4 - - 139.2 - - -       Body mass index is 44.63 kg/(m^2).

## 2017-06-29 NOTE — PATIENT INSTRUCTIONS
Take the following medications as noted. - If the medication is circled, take with a sip of water on the morning of surgery prior to reporting to the hospital  - If the medication has a line drawn through it, do not take that medication after starting your liquid diet before surgery  - If the medication has a star beside it, change its dosing as written beside it on the date written beside it. Current Outpatient Prescriptions   Medication Sig Dispense Refill    ALPRAZolam (XANAX) 2 mg tablet TK 1 T PO BID PRF ANXIETY  2    venlafaxine 225 mg tr24 TK 1 T PO QD  2    zolpidem (AMBIEN) 10 mg tablet TK 1 T PO QHS PRF SLEEP  2    cholecalciferol (VITAMIN D3) 1,000 unit cap Take  by mouth daily.  gabapentin (NEURONTIN) 800 mg tablet Take  by mouth three (3) times daily.

## 2017-06-29 NOTE — PROGRESS NOTES
Preop History and Physical Exam:    Sweetie Ochoa is a 36 y.o. female who comes into the office today after completing the entirety of the bariatric preoperative protocol satisfactorily. she initially identified obesity at the age of 23 and at age 27 weighed 155lbs. she has tried a variety of unsupervised weight-loss attempts, but has yet to meet with lasting success. Today, the patient is Height: 5' 11\" (180.3 cm) tall, Weight: 145.2 kg (320 lb) lbs for a Body mass index is 44.63 kg/(m^2). It is due to her severe obesity  that the patient is now seeking out bariatric surgery, specifically, the gastric bypass      Past Medical History:   Diagnosis Date    Anxiety     Ill-defined condition     HPV    Plantar fasciitis     Spondylosis     UTI (urinary tract infection)        Past Surgical History:   Procedure Laterality Date    HX APPENDECTOMY      HX  SECTION      HX LIPOMA RESECTION      HX OTHER SURGICAL      plantar faciitis  repair    HX SHOULDER ARTHROSCOPY      cartilage repair    HX TONSILLECTOMY         Current Outpatient Prescriptions   Medication Sig Dispense Refill    ALPRAZolam (XANAX) 2 mg tablet TK 1 T PO BID PRF ANXIETY  2    venlafaxine 225 mg tr24 TK 1 T PO QD  2    zolpidem (AMBIEN) 10 mg tablet TK 1 T PO QHS PRF SLEEP  2    cholecalciferol (VITAMIN D3) 1,000 unit cap Take  by mouth daily.  gabapentin (NEURONTIN) 800 mg tablet Take  by mouth three (3) times daily.          Allergies   Allergen Reactions    Prednisone Other (comments)     Makes very extreme aggression    Chantix [Varenicline] Other (comments)     syncope    Nsaids (Non-Steroidal Anti-Inflammatory Drug) Other (comments)     No after bariatric sx       Social History   Substance Use Topics    Smoking status: Former Smoker     Packs/day: 0.25     Types: Cigarettes    Smokeless tobacco: Former User    Alcohol use Yes      Comment: rarely       Family History   Problem Relation Age of Onset    No Known Problems Mother     Diabetes Father     Hypertension Father        Review of Systems:  Positive in BOLD - no change from May     CONST: Fever, weight loss, fatigue or chills  GI: Nausea, vomiting, abdominal pain, change in bowel habits, hematochezia, melena, and GERD   INTEG: Dermatitis, abnormal moles  HEENT: Recent changes in vision, vertigo, epistaxis, dysphagia and hoarseness  CV: Chest pain, palpitations, HTN, edema and varicosities  RESP: Cough, shortness of breath, wheezing, hemoptysis, snoring and reactive airway disease  : Hematuria, dysuria, frequency, urgency, nocturia and stress urinary incontinence   MS: Weakness, joint pain - feet and ankles and arthritis  ENDO: Diabetes, thyroid disease, polyuria, polydipsia, polyphagia, poor wound healing, heat intolerance, cold intolerance  LYMPH/HEME: Anemia, bruising and history of blood transfusions  NEURO: Dizziness, headache, fainting, seizures and stroke  PSYCH: Anxiety and depression    Physical Exam    Visit Vitals    /86    Pulse 79    Temp 98.2 °F (36.8 °C) (Oral)    Resp 16    Ht 5' 11\" (1.803 m)    Wt 145.2 kg (320 lb)    LMP 06/21/2017    SpO2 98%    BMI 44.63 kg/m2         General: 36 y.o.) female in no acute distress. Morbidly obese in breasts, abdomen - hips and thighs - gynecoid pattern  HEENT: Normocephalic, atraumatic, Pupils equal and reactive, nasopharynx clear, oropharynx clear and moist without lesions  NECK: Supple, no lymphadenopathy, thyromegaly, carotid bruits or jugular venous distension. trachea midline  RESP: Clear to auscultation bilaterally, no wheezes, rhonchi, or rales, normal respiratory excursion  CV: Regular rate and rhythm, no murmurs, rubs or gallops. 3+/4 pulses in bilateral dorsalis pedis and posterior tibialis. No distal edema or varicosities.   ABD: Soft, nontender, nondistended, normoactive bowel sounds, no hernias, no hepatosplenomegaly, easily palpable costal margins, gynecoid distribution, striae on anterior abdominal wall  Extremities: Warm, well perfused, no tenderness or swelling, normal gait/station  Neuro: Sensation and strength grossly intact and symmetrical  Psych: Alert and oriented to person, place, and time. Studies:    LABS: within normal limits except for hypovitaminosis D and low albumin - treated  Nicotine - PENDING - collected 6.26.17  EKG: without significant abnormalities  NUTRITIONAL EVALUATION has deemed her a good candidate for weight loss surgery  PSYCHIATRIC EVALUATION has deemed her a good candidate for weight loss surgery          Impression:    Du Javed is a 36 y.o. female who is suffering from morbid obesity who would benefit from bariatric surgery. We've discussed the restrictive and malabsorptive nature of the gastric bypass. The patient understands the likelihood of losing approximately 80% of their excess weight in 12 to 18 months. She also understands the risks including but not limited to bleeding, infection, need for reoperation, anastomotic ulcers, leaks and strictures, bowel obstruction secondary to adhesions and internal hernias, DVT, PE, heart attack, stroke, and death. Patient also understands risks of inadequate weight loss, excess weight loss, vitamin insufficiency, protein malnutrition, excess skin, and loss of hair. We have reviewed the components of a successful postoperative course including requirement for a high protein, low carbohydrate diet, 60 oz a day of zero calorie liquids, daily vitamin supplementation, daily exercise, regular follow-up, and participation in support groups. We have reviewed the preoperative liver shrinking clear liquid diet, as well as reviewed any medication changes required while on the clear liquid diet. In addition, the patient understands that all medications to be taken during the first 8 weeks postoperatively are to be crushed and must therefore be in immediate release formulations.   They further understand that it is their responsibility to review these and verify with their primary care doctor and pharmacist that all medications are crushable. We will schedule them for laparoscopic gastric bypass without liver biopsy in the near future. We need to confirm her nicotine status before surgery.

## 2017-07-01 LAB
COTININE SERPL-MCNC: NORMAL NG/ML
NICOTINE SERPL-MCNC: NORMAL NG/ML

## 2017-07-10 ENCOUNTER — ANESTHESIA EVENT (OUTPATIENT)
Dept: SURGERY | Age: 32
DRG: 621 | End: 2017-07-10
Payer: OTHER GOVERNMENT

## 2017-07-11 ENCOUNTER — ANESTHESIA (OUTPATIENT)
Dept: SURGERY | Age: 32
DRG: 621 | End: 2017-07-11
Payer: OTHER GOVERNMENT

## 2017-07-11 ENCOUNTER — HOSPITAL ENCOUNTER (INPATIENT)
Age: 32
LOS: 1 days | Discharge: HOME | DRG: 621 | End: 2017-07-12
Attending: SURGERY | Admitting: SURGERY
Payer: OTHER GOVERNMENT

## 2017-07-11 PROBLEM — E66.01 MORBID OBESITY (HCC): Status: ACTIVE | Noted: 2017-07-11

## 2017-07-11 LAB — HCG UR QL: NEGATIVE

## 2017-07-11 PROCEDURE — 77030018846 HC SOL IRR STRL H20 ICUM -A: Performed by: SURGERY

## 2017-07-11 PROCEDURE — 77030012012 HC AIRWY OP SFT TELE -A: Performed by: ANESTHESIOLOGY

## 2017-07-11 PROCEDURE — 77030020255 HC SOL INJ LR 1000ML BG: Performed by: SURGERY

## 2017-07-11 PROCEDURE — 76210000006 HC OR PH I REC 0.5 TO 1 HR: Performed by: SURGERY

## 2017-07-11 PROCEDURE — 77030031139 HC SUT VCRL2 J&J -A: Performed by: SURGERY

## 2017-07-11 PROCEDURE — 77030008437 HC REINF STRP REINF SEMGD WLGO -C: Performed by: SURGERY

## 2017-07-11 PROCEDURE — 77030027876 HC STPLR ENDOSC FLX PWR J&J -G1: Performed by: SURGERY

## 2017-07-11 PROCEDURE — 74011250637 HC RX REV CODE- 250/637: Performed by: SURGERY

## 2017-07-11 PROCEDURE — 77030035045 HC TRCR ENDOSC VRSPRT BLDLSS COVD -B: Performed by: SURGERY

## 2017-07-11 PROCEDURE — 74011250636 HC RX REV CODE- 250/636: Performed by: SURGERY

## 2017-07-11 PROCEDURE — 81025 URINE PREGNANCY TEST: CPT

## 2017-07-11 PROCEDURE — 76010000132 HC OR TIME 2.5 TO 3 HR: Performed by: SURGERY

## 2017-07-11 PROCEDURE — 77030036732 HC RELD STPLR VASC J&J -F: Performed by: SURGERY

## 2017-07-11 PROCEDURE — 74011000250 HC RX REV CODE- 250

## 2017-07-11 PROCEDURE — 77030026438 HC STYL ET INTUB CARD -A: Performed by: ANESTHESIOLOGY

## 2017-07-11 PROCEDURE — 65270000029 HC RM PRIVATE

## 2017-07-11 PROCEDURE — 77030032490 HC SLV COMPR SCD KNE COVD -B: Performed by: SURGERY

## 2017-07-11 PROCEDURE — 77030035048 HC TRCR ENDOSC OPTCL COVD -B: Performed by: SURGERY

## 2017-07-11 PROCEDURE — 77030034154 HC SHR COAG HARM ACE J&J -F: Performed by: SURGERY

## 2017-07-11 PROCEDURE — 77030037878 HC DRSG MEPILEX >48IN BORD MOLN -B: Performed by: SURGERY

## 2017-07-11 PROCEDURE — 77030011640 HC PAD GRND REM COVD -A: Performed by: SURGERY

## 2017-07-11 PROCEDURE — 0D164ZA BYPASS STOMACH TO JEJUNUM, PERCUTANEOUS ENDOSCOPIC APPROACH: ICD-10-PCS | Performed by: SURGERY

## 2017-07-11 PROCEDURE — 74011250636 HC RX REV CODE- 250/636: Performed by: ANESTHESIOLOGY

## 2017-07-11 PROCEDURE — 77030020782 HC GWN BAIR PAWS FLX 3M -B: Performed by: SURGERY

## 2017-07-11 PROCEDURE — 77030036598 HC CARTDRG STPL RELD ECHELON FLX J&J -D: Performed by: SURGERY

## 2017-07-11 PROCEDURE — 77030034850: Performed by: SURGERY

## 2017-07-11 PROCEDURE — 74011250636 HC RX REV CODE- 250/636

## 2017-07-11 PROCEDURE — 74011000250 HC RX REV CODE- 250: Performed by: ANESTHESIOLOGY

## 2017-07-11 PROCEDURE — 76060000036 HC ANESTHESIA 2.5 TO 3 HR: Performed by: SURGERY

## 2017-07-11 PROCEDURE — 77030008683 HC TU ET CUF COVD -A: Performed by: ANESTHESIOLOGY

## 2017-07-11 PROCEDURE — 77030008756 HC TU IRR SUC STRY -B: Performed by: SURGERY

## 2017-07-11 PROCEDURE — C9290 INJ, BUPIVACAINE LIPOSOME: HCPCS | Performed by: SURGERY

## 2017-07-11 PROCEDURE — 77030002933 HC SUT MCRYL J&J -A: Performed by: SURGERY

## 2017-07-11 PROCEDURE — 77030002996 HC SUT SLK J&J -A: Performed by: SURGERY

## 2017-07-11 PROCEDURE — 77030010507 HC ADH SKN DERMBND J&J -B: Performed by: SURGERY

## 2017-07-11 PROCEDURE — 74011250636 HC RX REV CODE- 250/636: Performed by: NURSE ANESTHETIST, CERTIFIED REGISTERED

## 2017-07-11 RX ORDER — HYDROMORPHONE HYDROCHLORIDE 2 MG/ML
INJECTION, SOLUTION INTRAMUSCULAR; INTRAVENOUS; SUBCUTANEOUS AS NEEDED
Status: DISCONTINUED | OUTPATIENT
Start: 2017-07-11 | End: 2017-07-11 | Stop reason: HOSPADM

## 2017-07-11 RX ORDER — GLYCOPYRROLATE 0.2 MG/ML
INJECTION INTRAMUSCULAR; INTRAVENOUS AS NEEDED
Status: DISCONTINUED | OUTPATIENT
Start: 2017-07-11 | End: 2017-07-11 | Stop reason: HOSPADM

## 2017-07-11 RX ORDER — ONDANSETRON 2 MG/ML
4 INJECTION INTRAMUSCULAR; INTRAVENOUS
Status: DISCONTINUED | OUTPATIENT
Start: 2017-07-11 | End: 2017-07-12

## 2017-07-11 RX ORDER — MIDAZOLAM HYDROCHLORIDE 1 MG/ML
INJECTION, SOLUTION INTRAMUSCULAR; INTRAVENOUS AS NEEDED
Status: DISCONTINUED | OUTPATIENT
Start: 2017-07-11 | End: 2017-07-11 | Stop reason: HOSPADM

## 2017-07-11 RX ORDER — DIPHENHYDRAMINE HYDROCHLORIDE 50 MG/ML
25 INJECTION, SOLUTION INTRAMUSCULAR; INTRAVENOUS
Status: DISCONTINUED | OUTPATIENT
Start: 2017-07-11 | End: 2017-07-11

## 2017-07-11 RX ORDER — HYDROMORPHONE HYDROCHLORIDE 2 MG/ML
0.5 INJECTION, SOLUTION INTRAMUSCULAR; INTRAVENOUS; SUBCUTANEOUS
Status: COMPLETED | OUTPATIENT
Start: 2017-07-11 | End: 2017-07-11

## 2017-07-11 RX ORDER — ENOXAPARIN SODIUM 100 MG/ML
40 INJECTION SUBCUTANEOUS ONCE
Status: COMPLETED | OUTPATIENT
Start: 2017-07-11 | End: 2017-07-11

## 2017-07-11 RX ORDER — ENOXAPARIN SODIUM 100 MG/ML
40 INJECTION SUBCUTANEOUS DAILY
Status: DISCONTINUED | OUTPATIENT
Start: 2017-07-12 | End: 2017-07-12 | Stop reason: HOSPADM

## 2017-07-11 RX ORDER — ACETAMINOPHEN 325 MG/1
650 TABLET ORAL
Status: DISCONTINUED | OUTPATIENT
Start: 2017-07-11 | End: 2017-07-11

## 2017-07-11 RX ORDER — ACETAMINOPHEN 325 MG/1
650 TABLET ORAL EVERY 4 HOURS
Status: DISCONTINUED | OUTPATIENT
Start: 2017-07-11 | End: 2017-07-12 | Stop reason: HOSPADM

## 2017-07-11 RX ORDER — DIPHENHYDRAMINE HYDROCHLORIDE 50 MG/ML
25 INJECTION, SOLUTION INTRAMUSCULAR; INTRAVENOUS
Status: DISCONTINUED | OUTPATIENT
Start: 2017-07-11 | End: 2017-07-12 | Stop reason: HOSPADM

## 2017-07-11 RX ORDER — KETOROLAC TROMETHAMINE 30 MG/ML
30 INJECTION, SOLUTION INTRAMUSCULAR; INTRAVENOUS EVERY 6 HOURS
Status: COMPLETED | OUTPATIENT
Start: 2017-07-11 | End: 2017-07-12

## 2017-07-11 RX ORDER — DIPHENHYDRAMINE HYDROCHLORIDE 50 MG/ML
25 INJECTION, SOLUTION INTRAMUSCULAR; INTRAVENOUS ONCE
Status: COMPLETED | OUTPATIENT
Start: 2017-07-11 | End: 2017-07-11

## 2017-07-11 RX ORDER — SODIUM CHLORIDE 0.9 % (FLUSH) 0.9 %
5-10 SYRINGE (ML) INJECTION EVERY 8 HOURS
Status: DISCONTINUED | OUTPATIENT
Start: 2017-07-11 | End: 2017-07-11 | Stop reason: HOSPADM

## 2017-07-11 RX ORDER — SUCCINYLCHOLINE CHLORIDE 20 MG/ML
INJECTION INTRAMUSCULAR; INTRAVENOUS AS NEEDED
Status: DISCONTINUED | OUTPATIENT
Start: 2017-07-11 | End: 2017-07-11 | Stop reason: HOSPADM

## 2017-07-11 RX ORDER — ZOLPIDEM TARTRATE 5 MG/1
5 TABLET ORAL
Status: DISCONTINUED | OUTPATIENT
Start: 2017-07-11 | End: 2017-07-12 | Stop reason: HOSPADM

## 2017-07-11 RX ORDER — FENTANYL CITRATE 50 UG/ML
INJECTION, SOLUTION INTRAMUSCULAR; INTRAVENOUS AS NEEDED
Status: DISCONTINUED | OUTPATIENT
Start: 2017-07-11 | End: 2017-07-11 | Stop reason: HOSPADM

## 2017-07-11 RX ORDER — INSULIN LISPRO 100 [IU]/ML
INJECTION, SOLUTION INTRAVENOUS; SUBCUTANEOUS ONCE
Status: DISCONTINUED | OUTPATIENT
Start: 2017-07-11 | End: 2017-07-11 | Stop reason: HOSPADM

## 2017-07-11 RX ORDER — ONDANSETRON 2 MG/ML
INJECTION INTRAMUSCULAR; INTRAVENOUS AS NEEDED
Status: DISCONTINUED | OUTPATIENT
Start: 2017-07-11 | End: 2017-07-11 | Stop reason: HOSPADM

## 2017-07-11 RX ORDER — ROCURONIUM BROMIDE 10 MG/ML
INJECTION, SOLUTION INTRAVENOUS AS NEEDED
Status: DISCONTINUED | OUTPATIENT
Start: 2017-07-11 | End: 2017-07-11 | Stop reason: HOSPADM

## 2017-07-11 RX ORDER — HYDROMORPHONE HYDROCHLORIDE 1 MG/ML
1 INJECTION, SOLUTION INTRAMUSCULAR; INTRAVENOUS; SUBCUTANEOUS
Status: DISCONTINUED | OUTPATIENT
Start: 2017-07-11 | End: 2017-07-12

## 2017-07-11 RX ORDER — PROPOFOL 10 MG/ML
INJECTION, EMULSION INTRAVENOUS AS NEEDED
Status: DISCONTINUED | OUTPATIENT
Start: 2017-07-11 | End: 2017-07-11 | Stop reason: HOSPADM

## 2017-07-11 RX ORDER — LIDOCAINE HYDROCHLORIDE 20 MG/ML
INJECTION, SOLUTION EPIDURAL; INFILTRATION; INTRACAUDAL; PERINEURAL AS NEEDED
Status: DISCONTINUED | OUTPATIENT
Start: 2017-07-11 | End: 2017-07-11 | Stop reason: HOSPADM

## 2017-07-11 RX ORDER — OXYCODONE HYDROCHLORIDE 5 MG/1
5-10 TABLET ORAL
Status: DISCONTINUED | OUTPATIENT
Start: 2017-07-11 | End: 2017-07-12 | Stop reason: HOSPADM

## 2017-07-11 RX ORDER — SODIUM CHLORIDE, SODIUM LACTATE, POTASSIUM CHLORIDE, CALCIUM CHLORIDE 600; 310; 30; 20 MG/100ML; MG/100ML; MG/100ML; MG/100ML
75 INJECTION, SOLUTION INTRAVENOUS CONTINUOUS
Status: DISCONTINUED | OUTPATIENT
Start: 2017-07-11 | End: 2017-07-11 | Stop reason: HOSPADM

## 2017-07-11 RX ORDER — SODIUM CHLORIDE 0.9 % (FLUSH) 0.9 %
5-10 SYRINGE (ML) INJECTION AS NEEDED
Status: DISCONTINUED | OUTPATIENT
Start: 2017-07-11 | End: 2017-07-11 | Stop reason: HOSPADM

## 2017-07-11 RX ORDER — DIPHENHYDRAMINE HYDROCHLORIDE 50 MG/ML
INJECTION, SOLUTION INTRAMUSCULAR; INTRAVENOUS
Status: COMPLETED
Start: 2017-07-11 | End: 2017-07-11

## 2017-07-11 RX ORDER — NEOSTIGMINE METHYLSULFATE 5 MG/5 ML
SYRINGE (ML) INTRAVENOUS AS NEEDED
Status: DISCONTINUED | OUTPATIENT
Start: 2017-07-11 | End: 2017-07-11 | Stop reason: HOSPADM

## 2017-07-11 RX ORDER — SODIUM CHLORIDE, SODIUM LACTATE, POTASSIUM CHLORIDE, CALCIUM CHLORIDE 600; 310; 30; 20 MG/100ML; MG/100ML; MG/100ML; MG/100ML
150 INJECTION, SOLUTION INTRAVENOUS CONTINUOUS
Status: DISCONTINUED | OUTPATIENT
Start: 2017-07-11 | End: 2017-07-12 | Stop reason: HOSPADM

## 2017-07-11 RX ADMIN — ROCURONIUM BROMIDE 10 MG: 10 INJECTION, SOLUTION INTRAVENOUS at 08:45

## 2017-07-11 RX ADMIN — OXYCODONE HYDROCHLORIDE 10 MG: 5 TABLET ORAL at 21:45

## 2017-07-11 RX ADMIN — HYDROMORPHONE HYDROCHLORIDE 1 MG: 1 INJECTION, SOLUTION INTRAMUSCULAR; INTRAVENOUS; SUBCUTANEOUS at 19:04

## 2017-07-11 RX ADMIN — HYDROMORPHONE HYDROCHLORIDE 0.5 MG: 2 INJECTION, SOLUTION INTRAMUSCULAR; INTRAVENOUS; SUBCUTANEOUS at 08:51

## 2017-07-11 RX ADMIN — HYDROMORPHONE HYDROCHLORIDE 0.5 MG: 2 INJECTION, SOLUTION INTRAMUSCULAR; INTRAVENOUS; SUBCUTANEOUS at 10:31

## 2017-07-11 RX ADMIN — SODIUM CHLORIDE, SODIUM LACTATE, POTASSIUM CHLORIDE, AND CALCIUM CHLORIDE: 600; 310; 30; 20 INJECTION, SOLUTION INTRAVENOUS at 09:14

## 2017-07-11 RX ADMIN — SODIUM CHLORIDE 1000 ML: 900 INJECTION, SOLUTION INTRAVENOUS at 10:22

## 2017-07-11 RX ADMIN — ROCURONIUM BROMIDE 10 MG: 10 INJECTION, SOLUTION INTRAVENOUS at 08:14

## 2017-07-11 RX ADMIN — PROPOFOL 200 MG: 10 INJECTION, EMULSION INTRAVENOUS at 07:28

## 2017-07-11 RX ADMIN — FENTANYL CITRATE 50 MCG: 50 INJECTION, SOLUTION INTRAMUSCULAR; INTRAVENOUS at 10:16

## 2017-07-11 RX ADMIN — HYDROMORPHONE HYDROCHLORIDE 0.5 MG: 2 INJECTION, SOLUTION INTRAMUSCULAR; INTRAVENOUS; SUBCUTANEOUS at 10:51

## 2017-07-11 RX ADMIN — DIPHENHYDRAMINE HYDROCHLORIDE 25 MG: 50 INJECTION, SOLUTION INTRAMUSCULAR; INTRAVENOUS at 11:10

## 2017-07-11 RX ADMIN — ROCURONIUM BROMIDE 5 MG: 10 INJECTION, SOLUTION INTRAVENOUS at 07:28

## 2017-07-11 RX ADMIN — ACETAMINOPHEN 650 MG: 325 TABLET ORAL at 13:04

## 2017-07-11 RX ADMIN — SUCCINYLCHOLINE CHLORIDE 140 MG: 20 INJECTION INTRAMUSCULAR; INTRAVENOUS at 07:28

## 2017-07-11 RX ADMIN — SODIUM CHLORIDE, SODIUM LACTATE, POTASSIUM CHLORIDE, AND CALCIUM CHLORIDE 150 ML/HR: 600; 310; 30; 20 INJECTION, SOLUTION INTRAVENOUS at 12:33

## 2017-07-11 RX ADMIN — GLYCOPYRROLATE 0.8 MG: 0.2 INJECTION INTRAMUSCULAR; INTRAVENOUS at 09:59

## 2017-07-11 RX ADMIN — OXYCODONE HYDROCHLORIDE 10 MG: 5 TABLET ORAL at 16:23

## 2017-07-11 RX ADMIN — FENTANYL CITRATE 50 MCG: 50 INJECTION, SOLUTION INTRAMUSCULAR; INTRAVENOUS at 08:01

## 2017-07-11 RX ADMIN — ENOXAPARIN SODIUM 40 MG: 40 INJECTION SUBCUTANEOUS at 07:16

## 2017-07-11 RX ADMIN — GLYCOPYRROLATE 0.2 MG: 0.2 INJECTION INTRAMUSCULAR; INTRAVENOUS at 07:22

## 2017-07-11 RX ADMIN — PROPOFOL 50 MG: 10 INJECTION, EMULSION INTRAVENOUS at 07:50

## 2017-07-11 RX ADMIN — HYDROMORPHONE HYDROCHLORIDE 1 MG: 1 INJECTION, SOLUTION INTRAMUSCULAR; INTRAVENOUS; SUBCUTANEOUS at 15:31

## 2017-07-11 RX ADMIN — ONDANSETRON 4 MG: 2 INJECTION INTRAMUSCULAR; INTRAVENOUS at 07:45

## 2017-07-11 RX ADMIN — ONDANSETRON 4 MG: 2 INJECTION INTRAMUSCULAR; INTRAVENOUS at 09:58

## 2017-07-11 RX ADMIN — OXYCODONE HYDROCHLORIDE 10 MG: 5 TABLET ORAL at 12:10

## 2017-07-11 RX ADMIN — SODIUM CHLORIDE, SODIUM LACTATE, POTASSIUM CHLORIDE, AND CALCIUM CHLORIDE: 600; 310; 30; 20 INJECTION, SOLUTION INTRAVENOUS at 08:13

## 2017-07-11 RX ADMIN — HYDROMORPHONE HYDROCHLORIDE 0.5 MG: 2 INJECTION, SOLUTION INTRAMUSCULAR; INTRAVENOUS; SUBCUTANEOUS at 10:08

## 2017-07-11 RX ADMIN — FENTANYL CITRATE 50 MCG: 50 INJECTION, SOLUTION INTRAMUSCULAR; INTRAVENOUS at 08:30

## 2017-07-11 RX ADMIN — HYDROMORPHONE HYDROCHLORIDE 0.5 MG: 2 INJECTION, SOLUTION INTRAMUSCULAR; INTRAVENOUS; SUBCUTANEOUS at 09:48

## 2017-07-11 RX ADMIN — MIDAZOLAM HYDROCHLORIDE 2 MG: 1 INJECTION, SOLUTION INTRAMUSCULAR; INTRAVENOUS at 07:22

## 2017-07-11 RX ADMIN — FENTANYL CITRATE 50 MCG: 50 INJECTION, SOLUTION INTRAMUSCULAR; INTRAVENOUS at 10:10

## 2017-07-11 RX ADMIN — HYDROMORPHONE HYDROCHLORIDE 0.5 MG: 2 INJECTION, SOLUTION INTRAMUSCULAR; INTRAVENOUS; SUBCUTANEOUS at 10:41

## 2017-07-11 RX ADMIN — ACETAMINOPHEN 650 MG: 325 TABLET ORAL at 20:58

## 2017-07-11 RX ADMIN — Medication 3 G: at 07:22

## 2017-07-11 RX ADMIN — SODIUM CHLORIDE, SODIUM LACTATE, POTASSIUM CHLORIDE, AND CALCIUM CHLORIDE: 600; 310; 30; 20 INJECTION, SOLUTION INTRAVENOUS at 10:08

## 2017-07-11 RX ADMIN — KETOROLAC TROMETHAMINE 30 MG: 30 INJECTION INTRAMUSCULAR; INTRAVENOUS at 17:44

## 2017-07-11 RX ADMIN — FENTANYL CITRATE 50 MCG: 50 INJECTION, SOLUTION INTRAMUSCULAR; INTRAVENOUS at 07:50

## 2017-07-11 RX ADMIN — Medication 4 MG: at 09:59

## 2017-07-11 RX ADMIN — ROCURONIUM BROMIDE 25 MG: 10 INJECTION, SOLUTION INTRAVENOUS at 07:40

## 2017-07-11 RX ADMIN — FAMOTIDINE 20 MG: 10 INJECTION, SOLUTION INTRAVENOUS at 06:57

## 2017-07-11 RX ADMIN — ROCURONIUM BROMIDE 10 MG: 10 INJECTION, SOLUTION INTRAVENOUS at 09:15

## 2017-07-11 RX ADMIN — LIDOCAINE HYDROCHLORIDE 60 MG: 20 INJECTION, SOLUTION EPIDURAL; INFILTRATION; INTRACAUDAL; PERINEURAL at 07:28

## 2017-07-11 RX ADMIN — ACETAMINOPHEN 650 MG: 325 TABLET ORAL at 16:23

## 2017-07-11 RX ADMIN — KETOROLAC TROMETHAMINE 30 MG: 30 INJECTION INTRAMUSCULAR; INTRAVENOUS at 12:10

## 2017-07-11 RX ADMIN — HYDROMORPHONE HYDROCHLORIDE 0.5 MG: 2 INJECTION, SOLUTION INTRAMUSCULAR; INTRAVENOUS; SUBCUTANEOUS at 10:26

## 2017-07-11 RX ADMIN — FENTANYL CITRATE 100 MCG: 50 INJECTION, SOLUTION INTRAMUSCULAR; INTRAVENOUS at 07:28

## 2017-07-11 RX ADMIN — SODIUM CHLORIDE, SODIUM LACTATE, POTASSIUM CHLORIDE, AND CALCIUM CHLORIDE 75 ML/HR: 600; 310; 30; 20 INJECTION, SOLUTION INTRAVENOUS at 06:57

## 2017-07-11 RX ADMIN — HYDROMORPHONE HYDROCHLORIDE 1 MG: 1 INJECTION, SOLUTION INTRAMUSCULAR; INTRAVENOUS; SUBCUTANEOUS at 23:27

## 2017-07-11 RX ADMIN — ZOLPIDEM TARTRATE 5 MG: 5 TABLET ORAL at 22:12

## 2017-07-11 RX ADMIN — HYDROMORPHONE HYDROCHLORIDE 0.5 MG: 2 INJECTION, SOLUTION INTRAMUSCULAR; INTRAVENOUS; SUBCUTANEOUS at 09:16

## 2017-07-11 NOTE — PROGRESS NOTES
Bedside and Verbal shift change report given to Arnulfo Soulier, RN (oncoming nurse) by Joelle Marcus RN (offgoing nurse). Report included the following information SBAR, Kardex, MAR and Recent Results.     SITUATION:    Code Status: Full Code   Reason for Admission: Morbid obesity, unspecified obesity type ([facility] [address] [E66.01]    [facility] day: 0   Problem List:       Hospital Problems  Never Reviewed          Codes Class Noted POA    Morbid obesity ([facility] [address] ICD-10-CM: E66.01  ICD-9-CM: 278.01  7/11/2017 Unknown              BACKGROUND:    Past Medical History:   Past Medical History:   Diagnosis Date    Anxiety     Ill-defined condition     HPV    Plantar fasciitis     Spondylosis     UTI (urinary tract infection)          Patient taking anticoagulants yes     ASSESSMENT:    Changes in Assessment Throughout Shift: none     Patient has Central Line: no Reasons if yes: n/a   Patient has Lara Cath: no Reasons if yes: n/a      Last Vitals:     Vitals:    07/11/17 1106 07/11/17 1116 07/11/17 1158 07/11/17 1649   BP: 122/67 116/67 125/81 132/78   Pulse: 63 67 86 79   Resp: 16 14 16 17   Temp:   98.2 °F (36.8 °C) 98.6 °F (37 °C)   SpO2: 100% 98% 97% 98%   Weight:       Height:       LMP: 06/21/2017        IV and DRAINS (will only show if present)   Peripheral IV 07/11/17 Left Hand-Site Assessment: Clean, dry, & intact     WOUND (if present)   Wound Type:  abd lap sites   Dressing present Dressing Present : Yes   Wound Concerns/Notes:  none     PAIN    Pain Assessment    Pain Intensity 1: 3 (07/11/17 1111)    Pain Location 1: Abdomen    Pain Intervention(s) 1: Medication (see MAR)    Patient Stated Pain Goal: 3  o Interventions for Pain:  See mar  o Intervention effective: yes  o Time of last intervention: 1630   o Reassessment Completed: yes      Last 3 Weights:  Last 3 Recorded Weights in this Encounter    06/28/17 1406   Weight: 145.2 kg (320 lb)     Weight change:      INTAKE/OUPUT    Current Shift: 07/11 0701 - 07/11 1900  In: 3000 [I.V.:3000]  Out: 300 [Urine:275]    Last three shifts:       LAB RESULTS   No results for input(s): WBC, HGB, HCT, PLT, HGBEXT, HCTEXT, PLTEXT in the last 72 hours. No results for input(s): NA, K, GLU, BUN, CREA, CA, MG, INR in the last 72 hours. No lab exists for component: PT, PTT, INREXT    RECOMMENDATIONS AND DISCHARGE PLANNING     1. Pending tests/procedures/ Plan of Care or Other Needs: NONE     2. Discharge plan for patient and Needs/Barriers: HOME    3. Estimated Discharge Date: 7/12/17 Posted on Whiteboard in [facility]: yes      4. The patient's care plan was reviewed with the oncoming nurse. \"HEALS\" SAFETY CHECK      Fall Risk    Total Score: 1    Safety Measures: Safety Measures: Bed/Chair-Wheels locked, Bed in low position, Call light within reach    A safety check occurred in the patient's room between off going nurse and oncoming nurse listed above. The safety check included the below items  Area Items   H  High Alert Medications - Verify all high alert medication drips (heparin, PCA, etc.)   E  Equipment - Suction is set up for ALL patients (with daniel)  - Red plugs utilized for all equipment (IV pumps, etc.)  - WOWs wiped down at end of shift.  - Room stocked with oxygen, suction, and other unit-specific supplies   A  Alarms - Bed alarm is set for fall risk patients  - Ensure chair alarm is in place and activated if patient is up in a chair   L  Lines - Check IV for any infiltration  - Lara bag is empty if patient has a Lara   - Tubing and IV bags are labeled   S  Safety   - Room is clean, patient is clean, and equipment is clean. - Hallways are clear from equipment besides carts. - Fall bracelet on for fall risk patients  - Ensure room is clear and free of clutter  - Suction is set up for ALL patients (with daniel)  - Hallways are clear from equipment besides carts.    - Isolation precautions followed, supplies available outside room, sign posted Franchesca Pascual RN

## 2017-07-11 NOTE — IP AVS SNAPSHOT
[address] Patient: Luann Arellano MRN: [id number] CGH:[birth year] You are allergic to the following Allergen Reactions Prednisone Other (comments) Makes very extreme aggression Chantix (Varenicline) Other (comments)  
 syncope Nsaids (Non-Steroidal Anti-Inflammatory Drug) Other (comments) No after bariatric sx Recent Documentation Height Weight Breastfeeding? BMI OB Status Smoking Status 1.803 m 145.2 kg No 44.63 kg/m2 Having regular periods Former Smoker Emergency Contacts Name Discharge Info Relation Home Work Mobile [address]  Spouse [3] [phone] Renata Lujan DISCHARGE CAREGIVER [3] Mother [14]   [phone] About your hospitalization You were admitted on:  July 11, 2017 You last received care in the:  [facility] 8095 You were discharged on:  July 12, 2017 Unit phone number:  [phone] Why you were hospitalized Your primary diagnosis was:  Not on File Your diagnoses also included: Morbid Obesity (Hcc) Providers Seen During Your Hospitalizations Provider Role Specialty Primary office phone Myles Almanza MD Attending Provider General Surgery [phone] Your Primary Care Physician (PCP) Primary Care Physician Office Phone Office Fax Sean Olivo [phone] Follow-up Information Follow up With Details Comments Contact Info Jeni Anderson MD  Patient can flu with pcp if needed. [address] [place] [address] 
[phone] Myles Almanza MD On 7/26/2017 10:45am at [address] [facility] 83 [id number] [phone] Your Appointments  Wednesday July 26, 2017 10:45 AM EDT  
POST OP with Myles Almanza MD  
[facility] [place] [address] WOOD Eusebio) 23[address] [facility] 240 191 [facility]  
[phone] Current Discharge Medication List  
  
START taking these medications Dose & Instructions Dispensing Information Comments Morning Noon Evening Bedtime  
 enoxaparin 40 mg/0.4 mL Commonly known as:  LOVENOX Your last dose was: Your next dose is:    
   
   
 Dose:  40 mg  
0.4 mL by SubCUTAneous route daily. Indications: DEEP VEIN THROMBOSIS PREVENTION Quantity:  7 Syringe Refills:  0  
     
   
   
   
  
 ondansetron 4 mg disintegrating tablet Commonly known as:  ZOFRAN ODT Your last dose was: Your next dose is:    
   
   
 Dose:  4 mg Take 1 Tab by mouth every six (6) hours as needed. Indications: PREVENTION OF POST-OPERATIVE NAUSEA AND VOMITING Quantity:  30 Tab Refills:  0  
     
   
   
   
  
 oxyCODONE IR 5 mg immediate release tablet Commonly known as:  Nobie Adiel Your last dose was: Your next dose is:    
   
   
 Dose:  5 mg Take 1 Tab by mouth every six (6) hours as needed. Max Daily Amount: 20 mg.  
 Quantity:  30 Tab Refills:  0 CONTINUE these medications which have NOT CHANGED Dose & Instructions Dispensing Information Comments Morning Noon Evening Bedtime ALPRAZolam 2 mg tablet Commonly known as:  Donata Carton Your last dose was: Your next dose is:    
   
   
 TK 1 T PO BID PRF ANXIETY Refills:  2  
     
   
   
   
  
 gabapentin 800 mg tablet Commonly known as:  NEURONTIN Your last dose was: Your next dose is: Take  by mouth three (3) times daily. Refills:  0  
     
   
   
   
  
 VITAMIN D3 1,000 unit Cap Generic drug:  cholecalciferol Your last dose was: Your next dose is: Take  by mouth daily. Refills:  0 STOP taking these medications   
 zolpidem 10 mg tablet Commonly known as:  Kaya العلي Where to Get Your Medications Information on where to get these meds will be given to you by the nurse or doctor. ! Ask your nurse or doctor about these medications  
  enoxaparin 40 mg/0.4 mL  
 ondansetron 4 mg disintegrating tablet  
 oxyCODONE IR 5 mg immediate release tablet Discharge Instructions Laparoscopic Gastric Banding: What to Expect at Home Your Recovery Laparoscopic gastric banding is surgery to make the stomach smaller. It is done to help people lose weight. The surgery limits the amount of food the stomach can hold. This helps you eat less and feel full sooner. The cuts (incisions) the doctor made in your belly will probably be sore for a few days after the surgery. If you have stitches, the doctor will take these out at your follow-up visit. After this surgery, weight loss is usually gradual but steady. You will have regular appointments with your doctor to check how you are doing. The doctor can adjust the band if you are not losing weight as expected, or if you have problems with the band. Some people continue to lose weight for 4 or 5 years after surgery. It is important to think of this surgery as a tool to help you lose weight. It is not an instant fix. You will still need to eat a healthy diet and get regular exercise. This will help you reach your weight goal and avoid regaining the weight you lose. It is common to have many different emotions after this surgery. You may feel happy or excited as you begin to lose weight. But you may also feel overwhelmed or frustrated by the changes that you have to make in your diet, activity, and lifestyle. Talk with your doctor if you have concerns or questions. This care sheet gives you a general idea about how long it will take for you to recover. But each person recovers at a different pace. Follow the steps below to get better as quickly as possible.  
How can you care for yourself at home? Activity · Rest when you feel tired. Getting enough sleep will help you recover. · Try to walk each day. Start by walking a little more than you did the day before. Bit by bit, increase the amount you walk. Walking boosts blood flow and helps prevent pneumonia and constipation. · Avoid strenuous activities, such as bicycle riding, jogging, weight lifting, or aerobic exercise, until your doctor says it is okay. · Until your doctor says it is okay, avoid lifting anything that would make you strain. This may include a child, heavy grocery bags and milk containers, a heavy briefcase or backpack, cat litter or dog food bags, or a vacuum . · Hold a pillow over your incisions when you cough or take deep breaths. This will support your belly and decrease your pain. · Do breathing exercises at home as instructed by your doctor. This will help prevent pneumonia. · Ask your doctor when you can drive again. · You will probably need to take 2 to 4 weeks off from work. It depends on the type of work you do and how you feel. · You may shower, if your doctor okays it. Pat the incisions dry. Do not take a bath for the first 2 weeks, or until your doctor tells you it is okay. · Ask your doctor when it is okay for you to have sex. Diet · Your doctor will give you specific instructions about what to eat after the surgery. For the first 2 weeks, you will need to follow a liquid or soft diet. Bit by bit, you will be able to add solid foods back into your diet. · Your doctor may recommend that you work with a dietitian to plan healthy meals that give you enough protein, vitamins, and minerals while you are losing weight. Even with a healthy diet, you probably will need to take vitamin and mineral supplements for the rest of your life. · At first you may feel full after just a few sips of water or other liquid. It is important to try to sip water throughout the day to avoid becoming dehydrated.  
· You may notice that your bowel movements are not regular right after your surgery. This is common. Try to avoid constipation and straining with bowel movements. Medicines · Your doctor will tell you if and when you can restart your medicines. He or she will also give you instructions about taking any new medicines. · If you take blood thinners, such as warfarin (Coumadin), clopidogrel (Plavix), or aspirin, be sure to talk to your doctor. He or she will tell you if and when to start taking those medicines again. Make sure that you understand exactly what your doctor wants you to do. · Be safe with medicines. Take pain medicines exactly as directed. ¨ If the doctor gave you a prescription medicine for pain, take it as prescribed. ¨ If you are not taking a prescription pain medicine, ask your doctor if you can take an over-the-counter medicine. · If you think your pain medicine is making you sick to your stomach: 
¨ Take your medicine after meals (unless your doctor has told you not to). ¨ Ask your doctor for a different pain medicine. · If your doctor prescribed antibiotics, take them as directed. Do not stop taking them just because you feel better. You need to take the full course of antibiotics. Incision care · If you have strips of tape on the incisions, leave the tape on for a week or until it falls off. · Wash the area daily with warm, soapy water, and pat it dry. Don't use hydrogen peroxide or alcohol, which can slow healing. You may cover the area with a gauze bandage if it weeps or rubs against clothing. Change the bandage every day. · Keep the area clean and dry. Follow-up care is a key part of your treatment and safety. Be sure to make and go to all appointments, and call your doctor if you are having problems. It's also a good idea to know your test results and keep a list of the medicines you take. When should you call for help? Call 911 anytime you think you may need emergency care.  For example, call if: 
· You passed out (lost consciousness). · You are short of breath. Call your doctor now or seek immediate medical care if: 
· You have pain that does not get better after you take pain medicine. · You cannot pass stool or gas. · You are sick to your stomach and cannot drink fluids. · You have loose stitches, or your incision comes open. · You have signs of a blood clot, such as: 
¨ Pain in your calf, back of the knee, thigh, or groin. ¨ Redness and swelling in your leg or groin. · You have signs of infection, such as: 
¨ Increased pain, swelling, warmth, or redness. ¨ Red streaks leading from the incision. ¨ Pus draining from the incision. ¨ A fever. Watch closely for changes in your health, and be sure to contact your doctor if you have any problems. Where can you learn more? Go to http://michelle-nabila.info/. Enter U011 in the search box to learn more about \"Laparoscopic Gastric Banding: What to Expect at Home. \" Current as of: October 13, 2016 Content Version: 11.3 © 2807-1814 [employer]. Care instructions adapted under license by [employer] (which disclaims liability or warranty for this information). If you have questions about a medical condition or this instruction, always ask your healthcare professional. [address] any warranty or liability for your use of this information. Laparoscopic Gastric Banding: What to Expect at Home Your Recovery Laparoscopic gastric banding is surgery to make the stomach smaller. It is done to help people lose weight. The surgery limits the amount of food the stomach can hold. This helps you eat less and feel full sooner. The cuts (incisions) the doctor made in your belly will probably be sore for a few days after the surgery. If you have stitches, the doctor will take these out at your follow-up visit. After this surgery, weight loss is usually gradual but steady. You will have regular appointments with your doctor to check how you are doing. The doctor can adjust the band if you are not losing weight as expected, or if you have problems with the band. Some people continue to lose weight for 4 or 5 years after surgery. It is important to think of this surgery as a tool to help you lose weight. It is not an instant fix. You will still need to eat a healthy diet and get regular exercise. This will help you reach your weight goal and avoid regaining the weight you lose. It is common to have many different emotions after this surgery. You may feel happy or excited as you begin to lose weight. But you may also feel overwhelmed or frustrated by the changes that you have to make in your diet, activity, and lifestyle. Talk with your doctor if you have concerns or questions. This care sheet gives you a general idea about how long it will take for you to recover. But each person recovers at a different pace. Follow the steps below to get better as quickly as possible. How can you care for yourself at home? Activity · Rest when you feel tired. Getting enough sleep will help you recover. · Try to walk each day. Start by walking a little more than you did the day before. Bit by bit, increase the amount you walk. Walking boosts blood flow and helps prevent pneumonia and constipation. · Avoid strenuous activities, such as bicycle riding, jogging, weight lifting, or aerobic exercise, until your doctor says it is okay. · Until your doctor says it is okay, avoid lifting anything that would make you strain. This may include a child, heavy grocery bags and milk containers, a heavy briefcase or backpack, cat litter or dog food bags, or a vacuum . · Hold a pillow over your incisions when you cough or take deep breaths. This will support your belly and decrease your pain. · Do breathing exercises at home as instructed by your doctor. This will help prevent pneumonia.  
· Ask your doctor when you can drive again. · You will probably need to take 2 to 4 weeks off from work. It depends on the type of work you do and how you feel. · You may shower, if your doctor okays it. Pat the incisions dry. Do not take a bath for the first 2 weeks, or until your doctor tells you it is okay. · Ask your doctor when it is okay for you to have sex. Diet · Your doctor will give you specific instructions about what to eat after the surgery. For the first 2 weeks, you will need to follow a liquid or soft diet. Bit by bit, you will be able to add solid foods back into your diet. · Your doctor may recommend that you work with a dietitian to plan healthy meals that give you enough protein, vitamins, and minerals while you are losing weight. Even with a healthy diet, you probably will need to take vitamin and mineral supplements for the rest of your life. · At first you may feel full after just a few sips of water or other liquid. It is important to try to sip water throughout the day to avoid becoming dehydrated. · You may notice that your bowel movements are not regular right after your surgery. This is common. Try to avoid constipation and straining with bowel movements. Medicines · Your doctor will tell you if and when you can restart your medicines. He or she will also give you instructions about taking any new medicines. · If you take blood thinners, such as warfarin (Coumadin), clopidogrel (Plavix), or aspirin, be sure to talk to your doctor. He or she will tell you if and when to start taking those medicines again. Make sure that you understand exactly what your doctor wants you to do. · Be safe with medicines. Take pain medicines exactly as directed. ¨ If the doctor gave you a prescription medicine for pain, take it as prescribed. ¨ If you are not taking a prescription pain medicine, ask your doctor if you can take an over-the-counter medicine.  
· If you think your pain medicine is making you sick to your stomach: 
¨ Take your medicine after meals (unless your doctor has told you not to). ¨ Ask your doctor for a different pain medicine. · If your doctor prescribed antibiotics, take them as directed. Do not stop taking them just because you feel better. You need to take the full course of antibiotics. Incision care · If you have strips of tape on the incisions, leave the tape on for a week or until it falls off. · Wash the area daily with warm, soapy water, and pat it dry. Don't use hydrogen peroxide or alcohol, which can slow healing. You may cover the area with a gauze bandage if it weeps or rubs against clothing. Change the bandage every day. · Keep the area clean and dry. Follow-up care is a key part of your treatment and safety. Be sure to make and go to all appointments, and call your doctor if you are having problems. It's also a good idea to know your test results and keep a list of the medicines you take. When should you call for help? Call 911 anytime you think you may need emergency care. For example, call if: 
· You passed out (lost consciousness). · You are short of breath. Call your doctor now or seek immediate medical care if: 
· You have pain that does not get better after you take pain medicine. · You cannot pass stool or gas. · You are sick to your stomach and cannot drink fluids. · You have loose stitches, or your incision comes open. · You have signs of a blood clot, such as: 
¨ Pain in your calf, back of the knee, thigh, or groin. ¨ Redness and swelling in your leg or groin. · You have signs of infection, such as: 
¨ Increased pain, swelling, warmth, or redness. ¨ Red streaks leading from the incision. ¨ Pus draining from the incision. ¨ A fever. Watch closely for changes in your health, and be sure to contact your doctor if you have any problems. Where can you learn more? Go to http://adan.info/.  
Enter U011 in the search box to learn more about \"Laparoscopic Gastric Banding: What to Expect at Home. \" Current as of: 2016 Content Version: 11.3 © 6925-0364 [employer]. Care instructions adapted under license by [employer] (which disclaims liability or warranty for this information). If you have questions about a medical condition or this instruction, always ask your healthcare professional. [address] any warranty or liability for your use of this information. Patient armband removed and shredded MyChart Activation Thank you for requesting access to Chamelic. Please follow the instructions below to securely access and download your online medical record. Chamelic allows you to send messages to your doctor, view your test results, renew your prescriptions, schedule appointments, and more. How Do I Sign Up? 1. In your internet browser, go to [email or web address] 
2. Click on the First Time User? Click Here link in the Sign In box. You will be redirect to the New Member Sign Up page. 3. Enter your Chamelic Access Code exactly as it appears below. You will not need to use this code after youve completed the sign-up process. If you do not sign up before the expiration date, you must request a new code. Chamelic Access Code: [email or web address] Expires: 2017  1:04 PM (This is the date your Chamelic access code will ) 4. Enter the last four digits of your Social Security Number (xxxx) and Date of Birth (mm/dd/yyyy) as indicated and click Submit. You will be taken to the next sign-up page. 5. Create a Chamelic ID. This will be your Chamelic login ID and cannot be changed, so think of one that is secure and easy to remember. 6. Create a Chamelic password. You can change your password at any time. 7. Enter your Password Reset Question and Answer. This can be used at a later time if you forget your password. 8. Enter your e-mail address.  You will receive e-mail notification when new information is available in [address] 9. Click Sign Up. You can now view and download portions of your medical record. 10. Click the Download Summary menu link to download a portable copy of your medical information. Additional Information If you have questions, please visit the Frequently Asked Questions section of the [employer] Moment website at [email or web address] [email or web address] Remember, [employer] Digitalhart is NOT to be used for urgent needs. For medical emergencies, dial 911. DISCHARGE SUMMARY from Nurse The following personal items are in your possession at time of discharge: 
 
Dental Appliances: None Visual Aid: None Jewelry: Ring Clothing: Undergarments, Pants, Shirt, Footwear Other Valuables: Cell Phone PATIENT INSTRUCTIONS: 
 
After general anesthesia or intravenous sedation, for 24 hours or while taking prescription Narcotics: · Limit your activities · Do not drive and operate hazardous machinery · Do not make important personal or business decisions · Do  not drink alcoholic beverages · If you have not urinated within 8 hours after discharge, please contact your surgeon on call. Report the following to your surgeon: 
· Excessive pain, swelling, redness or odor of or around the surgical area · Temperature over 100.5 · Nausea and vomiting lasting longer than 4 hours or if unable to take medications · Any signs of decreased circulation or nerve impairment to extremity: change in color, persistent  numbness, tingling, coldness or increase pain · Any questions What to do at Home: 
Recommended activity: Activity as tolerated If you experience any of the following symptoms severe pain, nausea, vomiting, dizziness, please follow up with Dr. Luzma Davis. *  Please give a list of your current medications to your Primary Care Provider.  
 
*  Please update this list whenever your medications are discontinued, doses are 
    changed, or new medications (including over-the-counter products) are added. *  Please carry medication information at all times in case of emergency situations. These are general instructions for a healthy lifestyle: No smoking/ No tobacco products/ Avoid exposure to second hand smoke Surgeon General's Warning:  Quitting smoking now greatly reduces serious risk to your health. Obesity, smoking, and sedentary lifestyle greatly increases your risk for illness A healthy diet, regular physical exercise & weight monitoring are important for maintaining a healthy lifestyle You may be retaining fluid if you have a history of heart failure or if you experience any of the following symptoms:  Weight gain of 3 pounds or more overnight or 5 pounds in a week, increased swelling in our hands or feet or shortness of breath while lying flat in bed. Please call your doctor as soon as you notice any of these symptoms; do not wait until your next office visit. Recognize signs and symptoms of STROKE: 
 
F-face looks uneven A-arms unable to move or move unevenly S-speech slurred or non-existent T-time-call 911 as soon as signs and symptoms begin-DO NOT go Back to bed or wait to see if you get better-TIME IS BRAIN. Warning Signs of HEART ATTACK Call 911 if you have these symptoms:  Chest discomfort. Most heart attacks involve discomfort in the center of the chest that lasts more than a few minutes, or that goes away and comes back. It can feel like uncomfortable pressure, squeezing, fullness, or pain.  Discomfort in other areas of the upper body. Symptoms can include pain or discomfort in one or both arms, the back, neck, jaw, or stomach.  Shortness of breath with or without chest discomfort.  Other signs may include breaking out in a cold sweat, nausea, or lightheadedness. Don't wait more than five minutes to call [address]! Fast action can save your life.  Calling 911 is almost always the fastest way to get lifesaving treatment. Emergency Medical Services staff can begin treatment when they arrive  up to an hour sooner than if someone gets to the hospital by car. The discharge information has been reviewed with the patient. The patient verbalized understanding. Discharge medications reviewed with the patient and appropriate educational materials and side effects teaching were provided. Discharge Orders None Introducing [address]! Echo Arevalo introduces Digidentity patient portal. Now you can access parts of your medical record, email your doctor's office, and request medication refills online. 1. In your internet browser, go to [email or web address] 2. Click on the First Time User? Click Here link in the Sign In box. You will see the New Member Sign Up page. 3. Enter your Digidentity Access Code exactly as it appears below. You will not need to use this code after youve completed the sign-up process. If you do not sign up before the expiration date, you must request a new code. · Digidentity Access Code: [email or web address] Expires: 8/6/2017  1:04 PM 
 
4. Enter the last four digits of your Social Security Number (xxxx) and Date of Birth (mm/dd/yyyy) as indicated and click Submit. You will be taken to the next sign-up page. 5. Create a Digidentity ID. This will be your Digidentity login ID and cannot be changed, so think of one that is secure and easy to remember. 6. Create a Digidentity password. You can change your password at any time. 7. Enter your Password Reset Question and Answer. This can be used at a later time if you forget your password. 8. Enter your e-mail address. You will receive e-mail notification when new information is available in [address] 9. Click Sign Up. You can now view and download portions of your medical record. 10. Click the Download Summary menu link to download a portable copy of your medical information.  
 
If you have questions, please visit the Frequently Asked Questions section of the MyChart website. Remember, MyChart is NOT to be used for urgent needs. For medical emergencies, dial 911. Now available from your iPhone and Android! General Information Please provide this summary of care documentation to your next provider. Patient Signature:  ____________________________________________________________ Date:  ____________________________________________________________  
  
Edrie Gunnels Provider Signature:  ____________________________________________________________ Date:  ____________________________________________________________

## 2017-07-11 NOTE — PROGRESS NOTES
conducted a pre-surgery visit with Malaika Joiner, who is a 36 y.o.,female. The  provided the following Interventions:  Initiated a relationship of care and support. Plan:  Chaplains will continue to follow and will provide pastoral care on an as needed/requested basis.  recommends bedside caregivers page  on duty if patient shows signs of acute spiritual or emotional distress.     [facility] Certified [address] [facility]   [phone]

## 2017-07-11 NOTE — ANESTHESIA PREPROCEDURE EVALUATION
Anesthetic History No history of anesthetic complications Review of Systems / Medical History Patient summary reviewed and pertinent labs reviewed Pulmonary Within defined limits Neuro/Psych Within defined limits Cardiovascular Exercise tolerance: >4 METS 
  
GI/Hepatic/Renal 
Within defined limits Endo/Other Morbid obesity Other Findings Comments:  
Risk Factors for Postoperative nausea/vomiting: 
     History of postoperative nausea/vomiting? NO Female? YES Motion sickness? NO Intended opioid administration for postoperative analgesia? NO Smoking Abstinence Current Smoker? NO Elective Surgery? YES Seen preoperatively by anesthesiologist or proxy prior to day of surgery? YES Pt abstained from smoking 24 hours prior to anesthesia? N/A Physical Exam 
 
Airway Mallampati: II 
TM Distance: 4 - 6 cm Neck ROM: normal range of motion Mouth opening: Normal 
 
 Cardiovascular Regular rate and rhythm,  S1 and S2 normal,  no murmur, click, rub, or gallop Dental 
No notable dental hx Pulmonary Breath sounds clear to auscultation Abdominal 
GI exam deferred Other Findings Anesthetic Plan ASA: 2 Anesthesia type: general 
 
 
 
 
Induction: Intravenous Anesthetic plan and risks discussed with: Patient

## 2017-07-11 NOTE — PERIOP NOTES
TRANSFER - OUT REPORT:    Verbal report given to Beth MÁRQUEZ(name) on Richelle Villalpando  being transferred to [address] [place] [address](unit) for routine post - op       Report consisted of patients Situation, Background, Assessment and   Recommendations(SBAR). Information from the following report(s) SBAR, OR Summary, Procedure Summary, Intake/Output and MAR was reviewed with the receiving nurse. Lines:   Peripheral IV 07/11/17 Left Hand (Active)   Site Assessment Clean, dry, & intact 7/11/2017 10:21 AM   Phlebitis Assessment 0 7/11/2017 10:21 AM   Infiltration Assessment 0 7/11/2017 10:21 AM   Dressing Status Clean, dry, & intact 7/11/2017 10:21 AM   Dressing Type Transparent;Tape 7/11/2017 10:21 AM   Hub Color/Line Status Pink; Infusing 7/11/2017 10:21 AM   Alcohol Cap Used No 7/11/2017  7:07 AM        Opportunity for questions and clarification was provided.       Patient transported with:   O2 @ 3 liters  Registered Nurse  [facility]

## 2017-07-11 NOTE — INTERVAL H&P NOTE
H&P Update:  Mendoza Solomon was seen and examined. History and physical has been reviewed. The patient has been examined.  There have been no significant clinical changes since the completion of the originally dated History and Physical.    Signed By: Sharlene Kan MD     July 11, 2017 7:07 AM

## 2017-07-11 NOTE — PROGRESS NOTES
Mobility Intervention:       [] Pt dangled at edge of bed    [] Pt assisted OOB to bedside commode    [] Pt assisted OOB to chair    [] Pt ambulated to bathroom    [x] Patient was ambulated in room/hallway    Assistive Device Utilized (check all that apply):       [] Rolling walker   [] Crutches   [] Straight Cane   [] Knee immobilizer   [] IV pole    After Mobilization:     [x] Patient left in no apparent distress sitting up in chair  [] Patient left in no apparent distress in bed  [] Call bell left within reach  Assistive Device:        [] RN notified  [] Caregiver present  [] Bed alarm activated    Comments:   Pt ambulated 3 laps in hallway

## 2017-07-11 NOTE — H&P (VIEW-ONLY)
Preop History and Physical Exam: 
 
Dora Durbin is a 36 y.o. female who comes into the office today after completing the entirety of the bariatric preoperative protocol satisfactorily. she initially identified obesity at the age of 23 and at age 27 weighed 155lbs. she has tried a variety of unsupervised weight-loss attempts, but has yet to meet with lasting success. Today, the patient is Height: 5' 11\" (180.3 cm) tall, Weight: 145.2 kg (320 lb) lbs for a Body mass index is 44.63 kg/(m^2). It is due to her severe obesity  that the patient is now seeking out bariatric surgery, specifically, the gastric bypass Past Medical History:  
Diagnosis Date  Anxiety  Ill-defined condition HPV  Plantar fasciitis  Spondylosis  UTI (urinary tract infection) Past Surgical History:  
Procedure Laterality Date  HX APPENDECTOMY  HX  SECTION    
 HX LIPOMA RESECTION    
 HX OTHER SURGICAL    
 plantar faciitis  repair  HX SHOULDER ARTHROSCOPY    
 cartilage repair  HX TONSILLECTOMY Current Outpatient Prescriptions Medication Sig Dispense Refill  ALPRAZolam (XANAX) 2 mg tablet TK 1 T PO BID PRF ANXIETY  2  
 venlafaxine 225 mg tr24 TK 1 T PO QD  2  
 zolpidem (AMBIEN) 10 mg tablet TK 1 T PO QHS PRF SLEEP  2  
 cholecalciferol (VITAMIN D3) 1,000 unit cap Take  by mouth daily.  gabapentin (NEURONTIN) 800 mg tablet Take  by mouth three (3) times daily. Allergies Allergen Reactions  Prednisone Other (comments) Makes very extreme aggression  Chantix [Varenicline] Other (comments)  
  syncope  Nsaids (Non-Steroidal Anti-Inflammatory Drug) Other (comments) No after bariatric sx Social History Substance Use Topics  Smoking status: Former Smoker Packs/day: 0.25 Types: Cigarettes  Smokeless tobacco: Former User  Alcohol use Yes Comment: rarely Family History Problem Relation Age of Onset  No Known Problems Mother  Diabetes Father  Hypertension Father Review of Systems:  Positive in BOLD - no change from May 
  
CONST: Fever, weight loss, fatigue or chills GI: Nausea, vomiting, abdominal pain, change in bowel habits, hematochezia, melena, and GERD INTEG: Dermatitis, abnormal moles HEENT: Recent changes in vision, vertigo, epistaxis, dysphagia and hoarseness CV: Chest pain, palpitations, HTN, edema and varicosities RESP: Cough, shortness of breath, wheezing, hemoptysis, snoring and reactive airway disease : Hematuria, dysuria, frequency, urgency, nocturia and stress urinary incontinence MS: Weakness, joint pain - feet and ankles and arthritis ENDO: Diabetes, thyroid disease, polyuria, polydipsia, polyphagia, poor wound healing, heat intolerance, cold intolerance LYMPH/HEME: Anemia, bruising and history of blood transfusions NEURO: Dizziness, headache, fainting, seizures and stroke PSYCH: Anxiety and depression Physical Exam 
 
Visit Vitals  /86  Pulse 79  Temp 98.2 °F (36.8 °C) (Oral)  Resp 16  
 Ht 5' 11\" (1.803 m)  Wt 145.2 kg (320 lb)  LMP 06/21/2017  SpO2 98%  BMI 44.63 kg/m2 General: 36 y.o.) female in no acute distress. Morbidly obese in breasts, abdomen - hips and thighs - gynecoid pattern HEENT: Normocephalic, atraumatic, Pupils equal and reactive, nasopharynx clear, oropharynx clear and moist without lesions NECK: Supple, no lymphadenopathy, thyromegaly, carotid bruits or jugular venous distension. trachea midline RESP: Clear to auscultation bilaterally, no wheezes, rhonchi, or rales, normal respiratory excursion CV: Regular rate and rhythm, no murmurs, rubs or gallops. 3+/4 pulses in bilateral dorsalis pedis and posterior tibialis. No distal edema or varicosities.  
ABD: Soft, nontender, nondistended, normoactive bowel sounds, no hernias, no hepatosplenomegaly, easily palpable costal margins, gynecoid distribution, striae on anterior abdominal wall Extremities: Warm, well perfused, no tenderness or swelling, normal gait/station Neuro: Sensation and strength grossly intact and symmetrical 
Psych: Alert and oriented to person, place, and time. Studies: LABS: within normal limits except for hypovitaminosis D and low albumin - treated Nicotine - PENDING - collected 6.26.17 EKG: without significant abnormalities NUTRITIONAL EVALUATION has deemed her a good candidate for weight loss surgery PSYCHIATRIC EVALUATION has deemed her a good candidate for weight loss surgery Impression: 
 
Yeny Marshall is a 36 y.o. female who is suffering from morbid obesity who would benefit from bariatric surgery. We've discussed the restrictive and malabsorptive nature of the gastric bypass. The patient understands the likelihood of losing approximately 80% of their excess weight in 12 to 18 months. She also understands the risks including but not limited to bleeding, infection, need for reoperation, anastomotic ulcers, leaks and strictures, bowel obstruction secondary to adhesions and internal hernias, DVT, PE, heart attack, stroke, and death. Patient also understands risks of inadequate weight loss, excess weight loss, vitamin insufficiency, protein malnutrition, excess skin, and loss of hair. We have reviewed the components of a successful postoperative course including requirement for a high protein, low carbohydrate diet, 60 oz a day of zero calorie liquids, daily vitamin supplementation, daily exercise, regular follow-up, and participation in support groups. We have reviewed the preoperative liver shrinking clear liquid diet, as well as reviewed any medication changes required while on the clear liquid diet. In addition, the patient understands that all medications to be taken during the first 8 weeks postoperatively are to be crushed and must therefore be in immediate release formulations.   They further understand that it is their responsibility to review these and verify with their primary care doctor and pharmacist that all medications are crushable. We will schedule them for laparoscopic gastric bypass without liver biopsy in the near future. We need to confirm her nicotine status before surgery.

## 2017-07-11 NOTE — ANESTHESIA POSTPROCEDURE EVALUATION
Post-Anesthesia Evaluation and Assessment Patient: Saud Grewal MRN: [id number]  SSN: [id number] YOB: 1985  Age: 36 y.o. Sex: female Cardiovascular Function/Vital Signs Visit Vitals  /67  Pulse 63  Temp 36.9 °C (98.4 °F)  Resp 16  
 Ht 5' 11\" (1.803 m)  Wt 145.2 kg (320 lb)  SpO2 100%  BMI 44.63 kg/m2 Patient is status post general anesthesia for Procedure(s): LAPAROSCOPIC BLADIMIR-EN-Y GASTRIC BYPASS . Nausea/Vomiting: None Postoperative hydration reviewed and adequate. Pain: 
Pain Scale 1: Numeric (0 - 10) (07/11/17 1111) Pain Intensity 1: 3 (07/11/17 1111) Managed Neurological Status:  
Neuro (WDL): Within Defined Limits (07/11/17 1021) At baseline Mental Status and Level of Consciousness: Arousable Pulmonary Status:  
O2 Device: Nasal cannula (07/11/17 1032) Adequate oxygenation and airway patent Complications related to anesthesia: None Post-anesthesia assessment completed. No concerns Signed By: Rufus Cobian MD   
 July 11, 2017

## 2017-07-11 NOTE — OP NOTES
DATE: 7/11/2017    PREOPERATIVE DIAGNOSIS: Clinically severe obesity, body mass index of 45. POSTOPERATIVE DIAGNOSIS: Clinically severe obesity, body mass index of 45,   PROCEDURES: Laparoscopic Richa-en-Y gastric bypass with 213 cm retrocolic   retrogastric Richa limb, 40 cm biliopancreatic limb, 15 ml    tubularized gastric pouch applied to the lesser curvature of stomach\  SURGEON: Dr. Lauralee Mcburney. MD Husam, FACS   ASSISTANT: Carlos Parker SA  ANESTHESIA: General endotracheal anesthesia. Local anesthetic Exparel 20 mL. FINDINGS: None  SPECIMEN: None  IMPLANTS: * No implants in log *  ESTIMATED BLOOD LOSS: 25 ml  FLUIDS: 3000 ml   URINE OUTPUT: 75 ml   DRAIN: None  COMPLICATIONS: none  OPERATIVE START TIME: 0746  OPERATIVE COMPLETION TIME: 1004    DESCRIPTION OF PROCEDURE: The patient was prepped and draped in standard sterile fashion after being placed under general anesthetic. A site was selected superior to the umbilicus in the midline. This area was incised. A SuperGen 12 mm Optical trocar was placed over the laparoscope and directed into the abdominal cavity using Optiview technique. Abdomen was insufflated to 15 mmHg and surveyed. There was no evidence of injury from the entry. Additional trocars were then placed. One 5 mmm trocar was placed in the anterior axillary line left upper quadrant approximately 3 fingerbreadths below the costal margin. Another 12 mm trocar was placed in the lateral portion of the left rectus sheath approximately 3 superior to the umbilical trocar. Another 12 mm trocar was placed approximately 4 fingerbreadths superior to the umbilical trocar in the  lateral portion of right rectus sheath. All were placed after incising with scalpel, all were placed using blunt dissecting technique. There was no evidence of injury from the entries. Instrument were placed in the abdominal cavity.  The omentum and transverse colon were retracted superiorly, exposing ligament of Treitz. Small bowel was measured 40 cm distal to the ligament of Treitz, divided at this level with A 60 mm [place] stapler. One additional load was used to divide down to the base of the mesentery, confirming preservation of blood flow to the small bowel above and below the staple line prior to firing. Then, from the distal staple line, the Richa limb was measured 100 cm distal and an antimesenteric enterotomy was made. Another antimesenteric enterotomy was made just proximal to the proximal staple line of the biliopancreatic limb. Through these enterotomies, a 60 mm [place] stapler was placed into the bowel, clamped on the antimesenteric borders and fired to form a stapled side-to-side anastomosis. Remaining enterotomy was closed in a running inverting fashion with 3-0 Vicryl suture. The mesenteric defect was then closed with running 2-0 silk suture, extending on the bowel wall in a running Lembert fashion for second layer closure of the enterotomy. At this point, attention was directed to the transverse mesocolon. A site was selected just anterior to the ligament of Treitz. This area was incised, entering the lesser sac. The Richa limb was then passed through the fenestration of the transverse mesocolon and into the lesser sac taking care not to twist on its mesentery. At this point, the omentum and transverse colon were retracted inferiorly. The greater curvature of the stomach was then grasped and the gastrocolic ligament was dissected off of it directly on the wall of the stomach using the Harmonic scalpel to widely open the lesser sac. Adhesions between the posterior wall of the stomach and the retroperitoneum were taken down under direct vision to fully free the lesser sac. At this point, an incision was made near the xiphoid.  Through this incision, a Jan retractor was placed through the abdominal wall beneath the left lateral segment of the liver and connected to a bedside retraction system allowing exposure of the esophageal hiatus. The angle of His was then dissected anterior to posterior down the left dominique of the diaphragm using Harmonic scalpel to assist in eventual division of the gastric pouch and distal stomach remnant. The lesser curvature of the stomach was then examined. A site was selected just proximal to the crow's foot of the vagus nerve in this area. The gastrohepatic ligament was dissected away from the lesser curvature of the stomach directly on the wall of the stomach to enter the lesser sac. This fenestration was then widened using Bovie cautery over a 10 mm articulating esophageal retractor which had been placed through the lesser sac and brought through the fenestration. Then, a 60 mm [place] stapler was placed transversely across the stomach through this fenestration, clamped and then fired to begin the formation of the gastric pouch. Another stapler was placed near the crotch of the previous staple line and directed superiorly toward the angle of His, and clamped. Prior to firing, a 34-[place] orogastric Nai tube was brought through the esophagus into the stomach so its tip was against the transverse staple line, its course lying along the lesser curvature of stomach. The stapler was snugged against it and then fired. Then, a 60 mm [place] stapler with Seam Guard reinforcement was placed in the crotch of the previous staple line and clamped and then fired from the crotch of previous staple line directed superiorly toward the angle of His until the gastric pouch was fully divided from the distal stomach remnant. Once fully divided, the staple lines were examined, noted to be hemostatic and viable. The lesser omentum was placed between the staple lines to prevent gastro-gastric fistulization. The tip of the gastric pouch placed in the lesser sac and the distal stomach remnant was retracted anteriorly to allow exposure of the lesser sac. The Richa limb was examined. The proximal 6 cm were noted to be tethered by its mesentery. This proximal segment was then excised from the mesentery using a 60 mm [place] staple load and then one additional load was used to divide the devascularized segment from the remainder of the Purvi limb. This segment was brought out of the abdominal cavity via one of the trocar sites, passed off the field and discarded. At this point, the gastrojejunostomy was begun by sewing the right antimesenteric border of the Purvi limb to the distal portion of the gastric pouch using running suture of 3-0 Vicryl. An anterior gastrotomy and antimesenteric enterotomy were made. From the left apex of these 2 enterotomies, a 3-0 Vicryl suture was placed and run on the posterior surface in a running inverting fashion to the right apex, transitioned on the anterior surface and sewn approximately half way across the opening in an inverting fashion. Then, another 3-0 Vicryl suture was placed at the left apex and sewn to the previous suture in a running inverting fashion on the anterior surface. Prior to tying these sutures, the Nai tube was brought across the anastomosis, then the sutures were tied, completing the inner layer. Then, from the left apex another 3-0 Vicryl suture was placed and run to the right apex in a running Lembert fashion completing the anterior outer layer os the anastomosis, thereby completing the anastomosis. Once this was complete, the Nai tube was removed from the patient. Then, working from within the lesser sac, the Purvi limb was sewn to the right anterior surface pf the transverse mesocolic defect with  3 interrupted sutures of 2-0 silk. Then, the left side of the purvi limb mesentery was closed to the left side of the tranverse mesocolic defect with a running suture of 2-0 silk. The omentum and transverse colon were retracted superiorly.  The base of the left side of the transverse mesocolic defect was exposed and this was sewn to the base of the left side of the Richa limb mesentery with a pursestring suture of 2-0 silk. The Richa limb was then retracted to the left. The right-side of the  transverse mesocolic defect was closed to the right side of the Richa limb mesentery with another pursestring suture of 2-0 silk, then one additional interrupted suture was placed between the right lateral surface of the Richa limb and the right lateral surface of the transverse mesocolic defect, completing the closure of the defect around the Richa limb. Once this was complete, both mesenteric defects were examined and noted to be well closed. Both anastomoses were examined and noted to be hemostatic and viable without evidence of leak. The omentum and transverse colon were retracted inferiorly back to normal position. The gastric remnant was placed over the anastomosis, returning the anastomosis into the lesser sac. The Jan retractor was removed. The midline trocar site was closed at its fascia using #2 Vicryl placed with a laparoscopic suture passer under direct vision without difficulty or injury. At this point, the abdomen was desufflated via the remaining trocars. All trocars were then removed. The trocar sites were rendered hemostatic with Bovie cautery, anesthetized with the local anesthetic mixture and then closed with interrupted 4-0 Monocryl deep dermal sutures. Dermabond was applied as wound dressings. The patient tolerated the procedure well.

## 2017-07-12 VITALS
RESPIRATION RATE: 18 BRPM | OXYGEN SATURATION: 99 % | BODY MASS INDEX: 41.02 KG/M2 | TEMPERATURE: 98.9 F | HEART RATE: 61 BPM | HEIGHT: 71 IN | SYSTOLIC BLOOD PRESSURE: 111 MMHG | DIASTOLIC BLOOD PRESSURE: 70 MMHG | WEIGHT: 293 LBS

## 2017-07-12 LAB
ANION GAP BLD CALC-SCNC: 7 MMOL/L (ref 3–18)
BUN SERPL-MCNC: 11 MG/DL (ref 7–18)
BUN/CREAT SERPL: 17 (ref 12–20)
CALCIUM SERPL-MCNC: 8.4 MG/DL (ref 8.5–10.1)
CHLORIDE SERPL-SCNC: 106 MMOL/L (ref 100–108)
CO2 SERPL-SCNC: 26 MMOL/L (ref 21–32)
CREAT SERPL-MCNC: 0.65 MG/DL (ref 0.6–1.3)
ERYTHROCYTE [DISTWIDTH] IN BLOOD BY AUTOMATED COUNT: 13.3 % (ref 11.6–14.5)
GLUCOSE SERPL-MCNC: 85 MG/DL (ref 74–99)
HCT VFR BLD AUTO: 36.3 % (ref 35–45)
HGB BLD-MCNC: 12 G/DL (ref 12–16)
MCH RBC QN AUTO: 30.2 PG (ref 24–34)
MCHC RBC AUTO-ENTMCNC: 33.1 G/DL (ref 31–37)
MCV RBC AUTO: 91.2 FL (ref 74–97)
PLATELET # BLD AUTO: 253 K/UL (ref 135–420)
PMV BLD AUTO: 10.9 FL (ref 9.2–11.8)
POTASSIUM SERPL-SCNC: 3.8 MMOL/L (ref 3.5–5.5)
RBC # BLD AUTO: 3.98 M/UL (ref 4.2–5.3)
SODIUM SERPL-SCNC: 139 MMOL/L (ref 136–145)
WBC # BLD AUTO: 8.3 K/UL (ref 4.6–13.2)

## 2017-07-12 PROCEDURE — 74011250636 HC RX REV CODE- 250/636: Performed by: PHYSICIAN ASSISTANT

## 2017-07-12 PROCEDURE — 36415 COLL VENOUS BLD VENIPUNCTURE: CPT | Performed by: SURGERY

## 2017-07-12 PROCEDURE — 80048 BASIC METABOLIC PNL TOTAL CA: CPT | Performed by: SURGERY

## 2017-07-12 PROCEDURE — 74011250637 HC RX REV CODE- 250/637: Performed by: SURGERY

## 2017-07-12 PROCEDURE — 51798 US URINE CAPACITY MEASURE: CPT

## 2017-07-12 PROCEDURE — 85027 COMPLETE CBC AUTOMATED: CPT | Performed by: SURGERY

## 2017-07-12 PROCEDURE — 74011250636 HC RX REV CODE- 250/636: Performed by: SURGERY

## 2017-07-12 RX ORDER — ONDANSETRON 4 MG/1
4 TABLET, ORALLY DISINTEGRATING ORAL
Status: DISCONTINUED | OUTPATIENT
Start: 2017-07-12 | End: 2017-07-12 | Stop reason: HOSPADM

## 2017-07-12 RX ORDER — HYDROMORPHONE HYDROCHLORIDE 1 MG/ML
0.5 INJECTION, SOLUTION INTRAMUSCULAR; INTRAVENOUS; SUBCUTANEOUS
Status: DISCONTINUED | OUTPATIENT
Start: 2017-07-12 | End: 2017-07-12 | Stop reason: HOSPADM

## 2017-07-12 RX ORDER — OXYCODONE HYDROCHLORIDE 5 MG/1
5 TABLET ORAL
Qty: 30 TAB | Refills: 0 | Status: SHIPPED | OUTPATIENT
Start: 2017-07-12 | End: 2017-08-09 | Stop reason: ALTCHOICE

## 2017-07-12 RX ORDER — ENOXAPARIN SODIUM 100 MG/ML
40 INJECTION SUBCUTANEOUS DAILY
Qty: 7 SYRINGE | Refills: 0 | Status: SHIPPED | OUTPATIENT
Start: 2017-07-12 | End: 2017-08-09 | Stop reason: ALTCHOICE

## 2017-07-12 RX ORDER — ONDANSETRON 4 MG/1
4 TABLET, ORALLY DISINTEGRATING ORAL
Qty: 30 TAB | Refills: 0 | Status: SHIPPED | OUTPATIENT
Start: 2017-07-12

## 2017-07-12 RX ADMIN — SODIUM CHLORIDE, SODIUM LACTATE, POTASSIUM CHLORIDE, AND CALCIUM CHLORIDE 150 ML/HR: 600; 310; 30; 20 INJECTION, SOLUTION INTRAVENOUS at 03:58

## 2017-07-12 RX ADMIN — SODIUM CHLORIDE, SODIUM LACTATE, POTASSIUM CHLORIDE, AND CALCIUM CHLORIDE 500 ML: 600; 310; 30; 20 INJECTION, SOLUTION INTRAVENOUS at 10:21

## 2017-07-12 RX ADMIN — KETOROLAC TROMETHAMINE 30 MG: 30 INJECTION INTRAMUSCULAR; INTRAVENOUS at 06:35

## 2017-07-12 RX ADMIN — OXYCODONE HYDROCHLORIDE 10 MG: 5 TABLET ORAL at 08:34

## 2017-07-12 RX ADMIN — ACETAMINOPHEN 650 MG: 325 TABLET ORAL at 08:34

## 2017-07-12 RX ADMIN — ACETAMINOPHEN 650 MG: 325 TABLET ORAL at 01:00

## 2017-07-12 RX ADMIN — ENOXAPARIN SODIUM 40 MG: 40 INJECTION SUBCUTANEOUS at 08:35

## 2017-07-12 RX ADMIN — KETOROLAC TROMETHAMINE 30 MG: 30 INJECTION INTRAMUSCULAR; INTRAVENOUS at 01:00

## 2017-07-12 RX ADMIN — ONDANSETRON 4 MG: 2 INJECTION INTRAMUSCULAR; INTRAVENOUS at 02:22

## 2017-07-12 RX ADMIN — OXYCODONE HYDROCHLORIDE 10 MG: 5 TABLET ORAL at 03:58

## 2017-07-12 RX ADMIN — ACETAMINOPHEN 650 MG: 325 TABLET ORAL at 13:03

## 2017-07-12 RX ADMIN — ACETAMINOPHEN 650 MG: 325 TABLET ORAL at 04:50

## 2017-07-12 RX ADMIN — OXYCODONE HYDROCHLORIDE 10 MG: 5 TABLET ORAL at 13:03

## 2017-07-12 NOTE — PROGRESS NOTES
Pt stated that she normally takes prescribed Ambien to help pt sleep at night. She asked if she would be about to take med tonight. I called and informed the on-call physician. The MD did not want to order any oral meds due to pts gstric surgery. MD said it was okay to admin previously ordered prn Benadryl for sleep.

## 2017-07-12 NOTE — ROUTINE PROCESS
Patient is alert and oriented x 4. Denies any acute distress, SOB or chest pain. VSS. Palpable pedal pulses. Lungs clear. Bowel sound are active in all four quadrants. Passing flatus. Patient tolerating clear liquid diet. No complaints of Nausea or vomiting. Voiding without any difficulty. 5 lap site to abdomen with dermabond glue  DANNIELLE GALARZA. No neurological deficits noted. Beds in low position, call bell is within reach, will continue to monitor.

## 2017-07-12 NOTE — PROGRESS NOTES
Care Management Interventions  PCP Verified by CM: Yes  Mode of Transport at Discharge: BLS  Transition of Care Consult (CM Consult): Discharge Planning  MyChart Signup: No  Discharge Durable Medical Equipment: No  Physical Therapy Consult: No  Occupational Therapy Consult: No  Speech Therapy Consult: No  Current Support Network: Lives with Spouse  Confirm Follow Up Transport: Family  Plan discussed with Pt/Family/Caregiver: Yes  Discharge Location  Discharge Placement: Home     37 y/o female admitted with morbid obesity. She states she is  and plans to go back home at discharge.

## 2017-07-12 NOTE — PROGRESS NOTES
Surgery Progress Note    7/12/2017    Admit Date: 7/11/2017    Subjective:     Patient has complaints of none Pain is controlled with APAP and PO oxycodone. Patient has been ambulating in halls. She reports no nausea and no vomiting. Bowel Movements: Flatus Pt with difficulty voiding overnight--straight cath produced 250cc. She reports spontaneous void of 200cc this am.     Objective:     Blood pressure 105/66, pulse 63, temperature 98.5 °F (36.9 °C), resp.  rate 17, height 5' 11\" (1.803 m), weight 145.2 kg (320 lb), last menstrual period 06/21/2017, SpO2 98 %, not currently breastfeeding.    07/12 0701 - 07/12 1900  In: 120 [P.O.:120]  Out: 200 [Urine:200]    07/10 1901 - 07/12 0700  In: 3000 [I.V.:3000]  Out: 850 [Urine:825]    EXAM: GENERAL: alert, pleasant, no distress   HEART: regular rate and rhythm   LUNGS: clear to auscultation   ABDOMEN:  Soft, obese, non tender , non distended, all incisions clean, dry, no erythema or drainage   EXTREMITIES: warm, well perfused    Data Review    Recent Results (from the past 24 hour(s))   CBC W/O DIFF    Collection Time: 07/12/17  4:00 AM   Result Value Ref Range    WBC 8.3 4.6 - 13.2 K/uL    RBC 3.98 (L) 4.20 - 5.30 M/uL    HGB 12.0 12.0 - 16.0 g/dL    HCT 36.3 35.0 - 45.0 %    MCV 91.2 74.0 - 97.0 FL    MCH 30.2 24.0 - 34.0 PG    MCHC 33.1 31.0 - 37.0 g/dL    RDW 13.3 11.6 - 14.5 %    PLATELET 603 188 - 133 K/uL    MPV 10.9 9.2 - 83.7 FL   METABOLIC PANEL, BASIC    Collection Time: 07/12/17  4:00 AM   Result Value Ref Range    Sodium 139 136 - 145 mmol/L    Potassium 3.8 3.5 - 5.5 mmol/L    Chloride 106 100 - 108 mmol/L    CO2 26 21 - 32 mmol/L    Anion gap 7 3.0 - 18 mmol/L    Glucose 85 74 - 99 mg/dL    BUN 11 7.0 - 18 MG/DL    Creatinine 0.65 0.6 - 1.3 MG/DL    BUN/Creatinine ratio 17 12 - 20      GFR est AA >60 >60 ml/min/1.73m2    GFR est non-AA >60 >60 ml/min/1.73m2    Calcium 8.4 (L) 8.5 - 10.1 MG/DL       Assessment:   Rosemary Valentine is a 36 y.o. female, day 1 status post laparoscopic gastric bypass.   Condition: good    Plan:     -decreased urine output, otherwise well--will give 500cc fluid bolus  -Advance to bariatric clears and protein shakes (already started and tolerating well)  -Ambulate ad nilda  -Consider d/c to home later today if improved UO and meets PO goals  MARILYNN AguayoC

## 2017-07-12 NOTE — PROGRESS NOTES
Pt has not voided in since removal of hernadez cath, eight hrs ago. Performed bladder scan, but scanner only displayed 8 0ml of urine in pts bladder. Called and informed MD. Md ordered straight cath ONCE and CBC. Asked the MD if he wanted to order another straight cath if pt does not void in so many hours. MD said to let pt try and void on their own after straight cath is performed.

## 2017-07-12 NOTE — NURSE NAVIGATOR
Post op diet progression discussed with patient. Patient to be discharged on a bariatric clear liquid diet. Patient verbalizes understanding of bariatric clear liquid and bariatric soft and moist diet. All of the patients questions and concerns have been addressed prior to discharge. Patient to follow up with surgeon in 7-14 days. Lovenox self injection instructions given. General Care after Surgery    1. No lifting over 15 lbs for 4 weeks. 2. No driving while taking the pain medication (approximately 7-10 days). 3. No tub baths, swimming or hot tubs until incisions are healed. (about 2 weeks)    4. You may shower. Clean incisions daily /gently with soap and check incisions for signs of infection:   Redness around incision   Swelling at site   Drainage with an foul odor (pus)   Increase tenderness around incision    5. Take your temperature and resting pulse in the morning and evening. Record on tracking form given to you. Call if your temperature is greater than 101 or your pulse rate is greater than 115.    6. Please contact your surgeon if you are having excessive abdominal pain (that lasts longer than 4 hours and does not improve with prescribed pain medication), vomiting or shortness of breath. 7. Get up and move - do not sit in one place for more than an hour. 8. You need to WALK (EXERCISE) for 30 minutes per day. (Walking around your house does not count)      a. Bike, treadmill and elliptical are OK  b. NO weight lifting or sit ups    9. If constipated take an adult dose of Miralax (available over the counter). Contact the doctors office if Miralax doesnt help. 10.  You may swallow pills starting the day after surgery as long as they fit inside this [place]:      11.  Continue to use your incentive spirometer (breathing machine) for the next couple of weeks to help prevent pneumonia. Phone numbers:   [facility] [phone]  [facility] [place] [address]  [facility] phone #: [phone]  Susan Watkins phone# [phone]   Erica Easley phone#: [phone]  [facility] phone#: [phone]    Key Diet Principles Following Bariatric Surgery     1. Begin each meal with soft moist high protein foods (i.e. chicken, turkey, yogurt, tuna, eggs, cottage cheese, other fish and seafood). 2.  Consume a minimum of 64 oz. of fluid each day to prevent dehydration. No straws. 3.  No food and fluid together. Stop drinking 30 minutes before a meal.  You may begin fluids again 30 minutes after you finish a meal.    4.  Eat very slowly and chew all foods completely. 6.  Keep portions small. 7.  No simple sugars or high fat foods. No carbonated beverages. No Caffeine. 8.  Eat 3 meals per day. No skipping. Avoid snacking between meals. 9.  No alcohol. No Smoking. 10. Two [place] Complete Chewable vitamins each day. Take one in the morning and one at night. 11. 1500 mg Calcium Citrate per day in separate dosages      12. Vitamin D 3: 5000 IU taken per day. 13. Vitamin B-12:  Take 1000 mcg sublingually daily    14. Iron: 60 mg per day from [employer]      15. Protein supplements of your choice. Must be low sugar (0-3 g), low fat    (0-3 g) and provide at least 35-40 g of protein each day. You need a total     (food + supplements) of 60 - 70 grams of protein each day     16. Minimum of 30 minutes of physical activity daily. Andrew Martinez Gastric Bypass and Sleeve Dietary Progression    Patient Name:   Date of Surgery: 7/11/17     Ice Chips start once admitted on floor. Begin Bariatric Clear Liquid Diet on: 7/12/17    Clear Liquid Diet: 64 oz. of fluid per day  o Low calorie, low sugar, non-carbonated beverages  - Water, Crystal Light, Propel Water, Sugar Free Jell-O, Sugar Free Popsicles, Bouillon  - Start protein supplement during this stage.  (60-70 grams per day)  - Getting your fluid in and staying hydrated is your #1 priority! - The clear liquid diet will last for 7 days. Begin Bariatric Soft and Moist on: 7/19/17  - This stage of the diet will last for 5 weeks, unless otherwise instructed by your surgeon. - Begin:  1 week post-op   - End:  6 weeks post-op (or when you follow up with the Registered Dietitian)    - Soft, moist, high protein foods: 3 meals per day plus protein supplements. o   Portions should emphasize on soft protein. o Portions will be a MAXIMUM of:  o  1 ounce of solid food  o  2-3 ounces of cottage cheese and yogurt. o Protein supplements should be between meals and provide 30-40 grams per day during soft protein diet. o Continue to get 64 ounces of fluid in per day. - Protein foods that are ok on the Soft and Moist Diet include:  o Slow transition:  o 1st week on soft protein should focus on: Yogurt, cottage cheese, eggs  o 2nd -4th  week on soft protein diet should focus on: yogurt, cottage cheese, eggs, canned tuna, canned chicken, tilapia, fish (needs to be soft enough to be cut up with a fork)  o 5th week on soft protein diet should focus on: Yogurt, cottage cheese, eggs, canned tuna, canned chicken, tilapia, fish, salmon, chicken breast, or turkey. Remember to continue to get 64 ounces of fluid daily on ALL Stages. To be advanced to Bariatric Maintenance Stage of the bariatric diet, follow up with the dietitian 6 weeks post-op, around:      TEMPERATURE/HEART RATE LOG  WEEK 1  Day Date Morning temperature Morning heart rate Evening temperature Evening heart rate   1        2        3        4        5        6        7          WEEK 2  Day Date Morning temperature Morning heart rate Evening temperature Evening heart rate   1        2        3        4        5        6        7          Instructions: Take your temperature and heart rate (pulse) twice a day for 14 days.   Take both in the morning and evening at about the same time each day (when you wake up and before you go to bed when you are relaxed)  Please contact your doctors office if your:  ? Temperature is higher than 101°  ? Heart rate (pulse) is higher than 115 beats per minute    (normal heart rate is  beats per minute)    Kiera/Jun Millan  [phone]  [place]:    [phone]  Dr. Maxine Sanchez   [phone]      HOW TO TAKE YOUR HEART RATE (PULSE)         How to do it:  1. Turn your left hand so that your palm is face-up. 2. With the index and middle fingers of your right hand, draw a line from the base of your thumb to just below the crease in your wrist. Your fingers should nestle just to the left of the large tendon that pops up when you bend your wrist toward you. 3. Dont press too hard, that will make the pulse go away. Use gentle pressure. 4. Wait. It can take several seconds--and several micro-adjustments in the placement of your two fingers on your wrist--to find your pulse. Just keep moving your fingers down or up your wrist in small increments (and pausing for a few seconds) until you find it. 5. To take your pulse rate:  1. Find a watch with a second hand and place it on your right wrist or on the table next to your left hand. 2. After finding your pulse, count the number of beats for 20 seconds. 3. Multiply by 3 to get your heart rate, or beats per minute (or just count for 60 seconds for a math-free option). 4. Normal, resting heart rate is about  beats per minute.

## 2017-07-12 NOTE — DISCHARGE INSTRUCTIONS
Laparoscopic Gastric Banding: What to Expect at Home  Your Recovery  Laparoscopic gastric banding is surgery to make the stomach smaller. It is done to help people lose weight. The surgery limits the amount of food the stomach can hold. This helps you eat less and feel full sooner. The cuts (incisions) the doctor made in your belly will probably be sore for a few days after the surgery. If you have stitches, the doctor will take these out at your follow-up visit. After this surgery, weight loss is usually gradual but steady. You will have regular appointments with your doctor to check how you are doing. The doctor can adjust the band if you are not losing weight as expected, or if you have problems with the band. Some people continue to lose weight for 4 or 5 years after surgery. It is important to think of this surgery as a tool to help you lose weight. It is not an instant fix. You will still need to eat a healthy diet and get regular exercise. This will help you reach your weight goal and avoid regaining the weight you lose. It is common to have many different emotions after this surgery. You may feel happy or excited as you begin to lose weight. But you may also feel overwhelmed or frustrated by the changes that you have to make in your diet, activity, and lifestyle. Talk with your doctor if you have concerns or questions. This care sheet gives you a general idea about how long it will take for you to recover. But each person recovers at a different pace. Follow the steps below to get better as quickly as possible. How can you care for yourself at home? Activity  · Rest when you feel tired. Getting enough sleep will help you recover. · Try to walk each day. Start by walking a little more than you did the day before. Bit by bit, increase the amount you walk. Walking boosts blood flow and helps prevent pneumonia and constipation.   · Avoid strenuous activities, such as bicycle riding, jogging, weight lifting, or aerobic exercise, until your doctor says it is okay. · Until your doctor says it is okay, avoid lifting anything that would make you strain. This may include a child, heavy grocery bags and milk containers, a heavy briefcase or backpack, cat litter or dog food bags, or a vacuum . · Hold a pillow over your incisions when you cough or take deep breaths. This will support your belly and decrease your pain. · Do breathing exercises at home as instructed by your doctor. This will help prevent pneumonia. · Ask your doctor when you can drive again. · You will probably need to take 2 to 4 weeks off from work. It depends on the type of work you do and how you feel. · You may shower, if your doctor okays it. Pat the incisions dry. Do not take a bath for the first 2 weeks, or until your doctor tells you it is okay. · Ask your doctor when it is okay for you to have sex. Diet  · Your doctor will give you specific instructions about what to eat after the surgery. For the first 2 weeks, you will need to follow a liquid or soft diet. Bit by bit, you will be able to add solid foods back into your diet. · Your doctor may recommend that you work with a dietitian to plan healthy meals that give you enough protein, vitamins, and minerals while you are losing weight. Even with a healthy diet, you probably will need to take vitamin and mineral supplements for the rest of your life. · At first you may feel full after just a few sips of water or other liquid. It is important to try to sip water throughout the day to avoid becoming dehydrated. · You may notice that your bowel movements are not regular right after your surgery. This is common. Try to avoid constipation and straining with bowel movements. Medicines  · Your doctor will tell you if and when you can restart your medicines. He or she will also give you instructions about taking any new medicines.   · If you take blood thinners, such as warfarin (Coumadin), clopidogrel (Plavix), or aspirin, be sure to talk to your doctor. He or she will tell you if and when to start taking those medicines again. Make sure that you understand exactly what your doctor wants you to do. · Be safe with medicines. Take pain medicines exactly as directed. ¨ If the doctor gave you a prescription medicine for pain, take it as prescribed. ¨ If you are not taking a prescription pain medicine, ask your doctor if you can take an over-the-counter medicine. · If you think your pain medicine is making you sick to your stomach:  ¨ Take your medicine after meals (unless your doctor has told you not to). ¨ Ask your doctor for a different pain medicine. · If your doctor prescribed antibiotics, take them as directed. Do not stop taking them just because you feel better. You need to take the full course of antibiotics. Incision care  · If you have strips of tape on the incisions, leave the tape on for a week or until it falls off. · Wash the area daily with warm, soapy water, and pat it dry. Don't use hydrogen peroxide or alcohol, which can slow healing. You may cover the area with a gauze bandage if it weeps or rubs against clothing. Change the bandage every day. · Keep the area clean and dry. Follow-up care is a key part of your treatment and safety. Be sure to make and go to all appointments, and call your doctor if you are having problems. It's also a good idea to know your test results and keep a list of the medicines you take. When should you call for help? Call 911 anytime you think you may need emergency care. For example, call if:  · You passed out (lost consciousness). · You are short of breath. Call your doctor now or seek immediate medical care if:  · You have pain that does not get better after you take pain medicine. · You cannot pass stool or gas. · You are sick to your stomach and cannot drink fluids. · You have loose stitches, or your incision comes open.   · You have signs of a blood clot, such as:  ¨ Pain in your calf, back of the knee, thigh, or groin. ¨ Redness and swelling in your leg or groin. · You have signs of infection, such as:  ¨ Increased pain, swelling, warmth, or redness. ¨ Red streaks leading from the incision. ¨ Pus draining from the incision. ¨ A fever. Watch closely for changes in your health, and be sure to contact your doctor if you have any problems. Where can you learn more? Go to http://michelle-nabila.info/. Enter U011 in the search box to learn more about \"Laparoscopic Gastric Banding: What to Expect at Home. \"  Current as of: October 13, 2016  Content Version: 11.3  © 1651-9884 [employer]. Care instructions adapted under license by [employer] (which disclaims liability or warranty for this information). If you have questions about a medical condition or this instruction, always ask your healthcare professional. [place] [address] any warranty or liability for your use of this information. Laparoscopic Gastric Banding: What to Expect at Home  Your Recovery  Laparoscopic gastric banding is surgery to make the stomach smaller. It is done to help people lose weight. The surgery limits the amount of food the stomach can hold. This helps you eat less and feel full sooner. The cuts (incisions) the doctor made in your belly will probably be sore for a few days after the surgery. If you have stitches, the doctor will take these out at your follow-up visit. After this surgery, weight loss is usually gradual but steady. You will have regular appointments with your doctor to check how you are doing. The doctor can adjust the band if you are not losing weight as expected, or if you have problems with the band. Some people continue to lose weight for 4 or 5 years after surgery. It is important to think of this surgery as a tool to help you lose weight. It is not an instant fix.  You will still need to eat a healthy diet and get regular exercise. This will help you reach your weight goal and avoid regaining the weight you lose. It is common to have many different emotions after this surgery. You may feel happy or excited as you begin to lose weight. But you may also feel overwhelmed or frustrated by the changes that you have to make in your diet, activity, and lifestyle. Talk with your doctor if you have concerns or questions. This care sheet gives you a general idea about how long it will take for you to recover. But each person recovers at a different pace. Follow the steps below to get better as quickly as possible. How can you care for yourself at home? Activity  · Rest when you feel tired. Getting enough sleep will help you recover. · Try to walk each day. Start by walking a little more than you did the day before. Bit by bit, increase the amount you walk. Walking boosts blood flow and helps prevent pneumonia and constipation. · Avoid strenuous activities, such as bicycle riding, jogging, weight lifting, or aerobic exercise, until your doctor says it is okay. · Until your doctor says it is okay, avoid lifting anything that would make you strain. This may include a child, heavy grocery bags and milk containers, a heavy briefcase or backpack, cat litter or dog food bags, or a vacuum . · Hold a pillow over your incisions when you cough or take deep breaths. This will support your belly and decrease your pain. · Do breathing exercises at home as instructed by your doctor. This will help prevent pneumonia. · Ask your doctor when you can drive again. · You will probably need to take 2 to 4 weeks off from work. It depends on the type of work you do and how you feel. · You may shower, if your doctor okays it. Pat the incisions dry. Do not take a bath for the first 2 weeks, or until your doctor tells you it is okay. · Ask your doctor when it is okay for you to have sex.   Diet  · Your doctor will give you specific instructions about what to eat after the surgery. For the first 2 weeks, you will need to follow a liquid or soft diet. Bit by bit, you will be able to add solid foods back into your diet. · Your doctor may recommend that you work with a dietitian to plan healthy meals that give you enough protein, vitamins, and minerals while you are losing weight. Even with a healthy diet, you probably will need to take vitamin and mineral supplements for the rest of your life. · At first you may feel full after just a few sips of water or other liquid. It is important to try to sip water throughout the day to avoid becoming dehydrated. · You may notice that your bowel movements are not regular right after your surgery. This is common. Try to avoid constipation and straining with bowel movements. Medicines  · Your doctor will tell you if and when you can restart your medicines. He or she will also give you instructions about taking any new medicines. · If you take blood thinners, such as warfarin (Coumadin), clopidogrel (Plavix), or aspirin, be sure to talk to your doctor. He or she will tell you if and when to start taking those medicines again. Make sure that you understand exactly what your doctor wants you to do. · Be safe with medicines. Take pain medicines exactly as directed. ¨ If the doctor gave you a prescription medicine for pain, take it as prescribed. ¨ If you are not taking a prescription pain medicine, ask your doctor if you can take an over-the-counter medicine. · If you think your pain medicine is making you sick to your stomach:  ¨ Take your medicine after meals (unless your doctor has told you not to). ¨ Ask your doctor for a different pain medicine. · If your doctor prescribed antibiotics, take them as directed. Do not stop taking them just because you feel better. You need to take the full course of antibiotics.   Incision care  · If you have strips of tape on the incisions, leave the tape on for a week or until it falls off. · Wash the area daily with warm, soapy water, and pat it dry. Don't use hydrogen peroxide or alcohol, which can slow healing. You may cover the area with a gauze bandage if it weeps or rubs against clothing. Change the bandage every day. · Keep the area clean and dry. Follow-up care is a key part of your treatment and safety. Be sure to make and go to all appointments, and call your doctor if you are having problems. It's also a good idea to know your test results and keep a list of the medicines you take. When should you call for help? Call 911 anytime you think you may need emergency care. For example, call if:  · You passed out (lost consciousness). · You are short of breath. Call your doctor now or seek immediate medical care if:  · You have pain that does not get better after you take pain medicine. · You cannot pass stool or gas. · You are sick to your stomach and cannot drink fluids. · You have loose stitches, or your incision comes open. · You have signs of a blood clot, such as:  ¨ Pain in your calf, back of the knee, thigh, or groin. ¨ Redness and swelling in your leg or groin. · You have signs of infection, such as:  ¨ Increased pain, swelling, warmth, or redness. ¨ Red streaks leading from the incision. ¨ Pus draining from the incision. ¨ A fever. Watch closely for changes in your health, and be sure to contact your doctor if you have any problems. Where can you learn more? Go to http://michelle-nabila.info/. Enter U011 in the search box to learn more about \"Laparoscopic Gastric Banding: What to Expect at Home. \"  Current as of: October 13, 2016  Content Version: 11.3  © 7936-3946 [employer]. Care instructions adapted under license by [employer] (which disclaims liability or warranty for this information).  If you have questions about a medical condition or this instruction, always ask your healthcare professional. [place] [address] any warranty or liability for your use of this information. Patient armband removed and shredded    MyChart Activation    Thank you for requesting access to [facility]. Please follow the instructions below to securely access and download your online medical record. [facility] allows you to send messages to your doctor, view your test results, renew your prescriptions, schedule appointments, and more. How Do I Sign Up? 1. In your internet browser, go to [email or web address]  2. Click on the First Time User? Click Here link in the Sign In box. You will be redirect to the New Member Sign Up page. 3. Enter your [facility] Access Code exactly as it appears below. You will not need to use this code after youve completed the sign-up process. If you do not sign up before the expiration date, you must request a new code. [facility] Access Code: [email or web address]  Expires: 2017  1:04 PM (This is the date your [facility] access code will )    4. Enter the last four digits of your Social Security Number (xxxx) and Date of Birth (mm/dd/yyyy) as indicated and click Submit. You will be taken to the next sign-up page. 5. Create a [facility] ID. This will be your [facility] login ID and cannot be changed, so think of one that is secure and easy to remember. 6. Create a [facility] password. You can change your password at any time. 7. Enter your Password Reset Question and Answer. This can be used at a later time if you forget your password. 8. Enter your e-mail address. You will receive e-mail notification when new information is available in [address] 9. Click Sign Up. You can now view and download portions of your medical record. 10. Click the Download Summary menu link to download a portable copy of your medical information.     Additional Information    If you have questions, please visit the Frequently Asked Questions section of the [facility] website at [email or web address] Remember, MyChart is NOT to be used for urgent needs. For medical emergencies, dial 911. DISCHARGE SUMMARY from Nurse    The following personal items are in your possession at time of discharge:    Dental Appliances: None  Visual Aid: None        Jewelry: Ring  Clothing: Undergarments, Pants, Shirt, Footwear  Other Valuables: Cell Phone             PATIENT INSTRUCTIONS:    After general anesthesia or intravenous sedation, for 24 hours or while taking prescription Narcotics:  · Limit your activities  · Do not drive and operate hazardous machinery  · Do not make important personal or business decisions  · Do  not drink alcoholic beverages  · If you have not urinated within 8 hours after discharge, please contact your surgeon on call. Report the following to your surgeon:  · Excessive pain, swelling, redness or odor of or around the surgical area  · Temperature over 100.5  · Nausea and vomiting lasting longer than 4 hours or if unable to take medications  · Any signs of decreased circulation or nerve impairment to extremity: change in color, persistent  numbness, tingling, coldness or increase pain  · Any questions        What to do at Home:  Recommended activity: Activity as tolerated    If you experience any of the following symptoms severe pain, nausea, vomiting, dizziness, please follow up with Dr. Angel Cox. *  Please give a list of your current medications to your Primary Care Provider. *  Please update this list whenever your medications are discontinued, doses are      changed, or new medications (including over-the-counter products) are added. *  Please carry medication information at all times in case of emergency situations.           These are general instructions for a healthy lifestyle:    No smoking/ No tobacco products/ Avoid exposure to second hand smoke    Surgeon General's Warning:  Quitting smoking now greatly reduces serious risk to your health. Obesity, smoking, and sedentary lifestyle greatly increases your risk for illness    A healthy diet, regular physical exercise & weight monitoring are important for maintaining a healthy lifestyle    You may be retaining fluid if you have a history of heart failure or if you experience any of the following symptoms:  Weight gain of 3 pounds or more overnight or 5 pounds in a week, increased swelling in our hands or feet or shortness of breath while lying flat in bed. Please call your doctor as soon as you notice any of these symptoms; do not wait until your next office visit. Recognize signs and symptoms of STROKE:    F-face looks uneven    A-arms unable to move or move unevenly    S-speech slurred or non-existent    T-time-call 911 as soon as signs and symptoms begin-DO NOT go       Back to bed or wait to see if you get better-TIME IS BRAIN. Warning Signs of HEART ATTACK     Call 911 if you have these symptoms:   Chest discomfort. Most heart attacks involve discomfort in the center of the chest that lasts more than a few minutes, or that goes away and comes back. It can feel like uncomfortable pressure, squeezing, fullness, or pain.  Discomfort in other areas of the upper body. Symptoms can include pain or discomfort in one or both arms, the back, neck, jaw, or stomach.  Shortness of breath with or without chest discomfort.  Other signs may include breaking out in a cold sweat, nausea, or lightheadedness. Don't wait more than five minutes to call 911 - MINUTES MATTER! Fast action can save your life. Calling 911 is almost always the fastest way to get lifesaving treatment. Emergency Medical Services staff can begin treatment when they arrive -- up to an hour sooner than if someone gets to the hospital by car. The discharge information has been reviewed with the patient. The patient verbalized understanding.     Discharge medications reviewed with the patient and appropriate educational materials and side effects teaching were provided.

## 2017-07-12 NOTE — PROGRESS NOTES
Discharge instructions given with mother present. Pt verbalized understanding of how to self-administer Lovenox. Pt also demonstrated correctly how to self-administer Lovenox. Verified with gastric nurse YULISA Wade if ok to discharge pt although only voided \"a little bit\" (per pt) after 500ml NS bolus. No amount documented. Per Janice Sahu, ok to  discharge pt and remind pt to monitor urine output at home and to call 's office if having difficulty voiding or continues to have decreased urine output / no urine output in 8 hours. All these explained to pt and she verbalized understanding.

## 2017-07-13 ENCOUNTER — OFFICE VISIT (OUTPATIENT)
Dept: SURGERY | Age: 32
End: 2017-07-13

## 2017-07-13 VITALS
HEART RATE: 65 BPM | BODY MASS INDEX: 41.02 KG/M2 | HEIGHT: 71 IN | RESPIRATION RATE: 20 BRPM | DIASTOLIC BLOOD PRESSURE: 64 MMHG | WEIGHT: 293 LBS | TEMPERATURE: 96.9 F | SYSTOLIC BLOOD PRESSURE: 139 MMHG

## 2017-07-13 DIAGNOSIS — E66.01 MORBID OBESITY DUE TO EXCESS CALORIES (HCC): Primary | ICD-10-CM

## 2017-07-13 RX ORDER — LANOLIN ALCOHOL/MO/W.PET/CERES
1000 CREAM (GRAM) TOPICAL DAILY
COMMUNITY

## 2017-07-13 NOTE — PROGRESS NOTES
Tray Puga is a 36 y.o. female who presents today with   Chief Complaint   Patient presents with    Morbid Obesity     LGBP 7/11/2017                Body mass index is 44.49 kg/(m^2). 1. Have you been to the ER, urgent care clinic since your last visit? Hospitalized since your last visit? No    2. Have you seen or consulted any other health care providers outside of the [address] [place] [address] since your last visit? Include any pap smears or colon screening.  No

## 2017-07-13 NOTE — PROGRESS NOTES
Subjective:      Leena Quinones is a 36 y.o. female is now 2 days status post laparoscopic gastric bypass surgery. Doing well overall. Currently on a stage 2 diet without difficulty. Taking in 32oz water daily. Sources of protein include shakes. 30 min of activity 3 days a week, including walking. Patient is passing gas, has not had a BM yet. The patient is not compliant with multivitamins, calcium, Vit D and B12 supplements. Weight Loss Metrics 2017   Pre op / Initial Wt - - 320 - - 320 -   Today's Wt 319 lb - - 320 lb 320 lb - 320 lb   BMI 44.49 kg/m2 44.63 kg/m2 - 44.63 kg/m2 - - 47.26 kg/m2   Ideal Body Wt - - 152 - - 146 -   Excess Body Wt - - 168 - - 174 -   Goal Wt - - 186 - - 181 -   Wt loss to date - - 0 - - 0 -   % Wt Loss - - 0 - - 0 -   80% EBW - - 134.4 - - 139.2 -       Body mass index is 44.49 kg/(m^2). Comorbidities:    Past Medical History:   Diagnosis Date    Anxiety     Ill-defined condition     HPV    Plantar fasciitis     Spondylosis     UTI (urinary tract infection)        Past Surgical History:   Procedure Laterality Date    HX APPENDECTOMY      HX  SECTION      HX GASTRIC BYPASS  2017    HX LIPOMA RESECTION      HX OTHER SURGICAL      plantar faciitis  repair    HX SHOULDER ARTHROSCOPY      cartilage repair    HX TONSILLECTOMY         Current Outpatient Prescriptions   Medication Sig Dispense Refill    enoxaparin (LOVENOX) 40 mg/0.4 mL 0.4 mL by SubCUTAneous route daily. Indications: DEEP VEIN THROMBOSIS PREVENTION 7 Syringe 0    ondansetron (ZOFRAN ODT) 4 mg disintegrating tablet Take 1 Tab by mouth every six (6) hours as needed. Indications: PREVENTION OF POST-OPERATIVE NAUSEA AND VOMITING 30 Tab 0    oxyCODONE IR (ROXICODONE) 5 mg immediate release tablet Take 1 Tab by mouth every six (6) hours as needed.  Max Daily Amount: 20 mg. 30 Tab 0    cholecalciferol (VITAMIN D3) 1,000 unit cap Take  by mouth daily.  MULTIVIT-MINERALS/FERROUS FUM (MULTI VITAMIN PO) Take  by mouth.  cyanocobalamin 1,000 mcg tablet Take 1,000 mcg by mouth daily.  ALPRAZolam (XANAX) 2 mg tablet TK 1 T PO BID PRF ANXIETY  2    gabapentin (NEURONTIN) 800 mg tablet Take  by mouth three (3) times daily. Allergies   Allergen Reactions    Prednisone Other (comments)     Makes very extreme aggression    Chantix [Varenicline] Other (comments)     syncope    Nsaids (Non-Steroidal Anti-Inflammatory Drug) Other (comments)     No after bariatric sx         Objective:     Visit Vitals    /64 (BP 1 Location: Left arm, BP Patient Position: At rest)    Pulse 65    Temp 96.9 °F (36.1 °C) (Oral)    Resp 20    Ht 5' 11\" (1.803 m)    Wt 144.7 kg (319 lb)    LMP 06/21/2017    BMI 44.49 kg/m2       General:  alert, cooperative, no distress, appears stated age   Chest: lungs clear to auscultation, no accessory muscle use   Cor:   Regular rate and rhythm   Abdomen: soft, bowel sounds active, non-tender, no hernias   Incisions:   healing well, no drainage, no erythema, no hernia, no seroma, no swelling, no dehiscence, incision well approximated    Extremities: LLE is negative for Jeb's sign, no erythema, edema is localized to the foot and is +1. Assessment:     Doing well postoperatively. Postoperative pain and LLE edema. Plan:   If LLE develops more edema, patient is to call our office and we will order a Doppler US to r/o DVT. Patient is currently on daily Lovenox injections for DVT prophylaxis. Patient is to alternate Roxicodone and Tylenol. Take Roxicodone, then two hours later take tylenol, then two hours later take Roxicodone again. She is encouraged to use ice packs for pain as well.   Increase fluids to a minimum of 64oz daily  Encouraged to attend support group  Follow up in 2 weeks

## 2017-07-13 NOTE — MR AVS SNAPSHOT
Visit Information Date & Time Provider Department Dept. Phone Encounter #  
 7/13/2017  3:00 PM MD [facility] [phone] [id number] Follow-up Instructions Return in about 2 weeks (around 7/27/2017). Your Appointments 7/26/2017 10:45 AM  
POST OP with MD [facility] [address] [place] [address]) Appt Note: post op appointment [address] [facility] Davida Bearded Abdoulaye [address] V[address] Upcoming Health Maintenance Date Due DTaP/Tdap/Td series (1 - Tdap) 8/6/2008 PAP AKA CERVICAL CYTOLOGY 8/6/2008 INFLUENZA AGE 9 TO ADULT 8/1/2017 Allergies as of 7/13/2017  Review Complete On: 7/11/2017 By: Noel Huerta RN Severity Noted Reaction Type Reactions Prednisone High 06/28/2017    Other (comments) Makes very extreme aggression Chantix [Varenicline]  01/10/2015    Other (comments)  
 syncope Nsaids (Non-steroidal Anti-inflammatory Drug)  06/28/2017    Other (comments) No after bariatric sx Current Immunizations  Never Reviewed No immunizations on file. Not reviewed this visit Vitals BP Pulse Temp Resp Height(growth percentile) Weight(growth percentile) 139/64 (BP 1 Location: Left arm, BP Patient Position: At rest) 65 96.9 °F (36.1 °C) (Oral) 20 5' 11\" (1.803 m) 319 lb (144.7 kg) LMP BMI OB Status Smoking Status 06/21/2017 44.49 kg/m2 Having regular periods Former Smoker BMI and BSA Data Body Mass Index Body Surface Area 44.49 kg/m 2 2.69 m 2 Your Updated Medication List  
  
   
This list is accurate as of: 7/13/17  3:45 PM.  Always use your most recent med list.  
  
  
  
  
 ALPRAZolam 2 mg tablet Commonly known as:  Jestine Pies TK 1 T PO BID PRF ANXIETY  
  
 cyanocobalamin 1,000 mcg tablet Take 1,000 mcg by mouth daily.  
  
 enoxaparin 40 mg/0.4 mL Commonly known as:  LOVENOX  
0.4 mL by SubCUTAneous route daily. Indications: DEEP VEIN THROMBOSIS PREVENTION  
  
 gabapentin 800 mg tablet Commonly known as:  NEURONTIN Take  by mouth three (3) times daily. MULTI VITAMIN PO Take  by mouth. ondansetron 4 mg disintegrating tablet Commonly known as:  ZOFRAN ODT Take 1 Tab by mouth every six (6) hours as needed. Indications: PREVENTION OF POST-OPERATIVE NAUSEA AND VOMITING  
  
 oxyCODONE IR 5 mg immediate release tablet Commonly known as:  Valene Kendall Take 1 Tab by mouth every six (6) hours as needed. Max Daily Amount: 20 mg. VITAMIN D3 1,000 unit Cap Generic drug:  cholecalciferol Take  by mouth daily. Follow-up Instructions Return in about 2 weeks (around 7/27/2017). Introducing [facility]! [facility] introduces [employer]Flow patient portal. Now you can access parts of your medical record, email your doctor's office, and request medication refills online. 1. In your internet browser, go to [email or web address] 2. Click on the First Time User? Click Here link in the Sign In box. You will see the New Member Sign Up page. 3. Enter your [employer]Flow Access Code exactly as it appears below. You will not need to use this code after youve completed the sign-up process. If you do not sign up before the expiration date, you must request a new code. · [employer]Flow Access Code: [email or web address] Expires: 8/6/2017  1:04 PM 
 
4. Enter the last four digits of your Social Security Number (xxxx) and Date of Birth (mm/dd/yyyy) as indicated and click Submit. You will be taken to the next sign-up page. 5. Create a "[employer]t ID. This will be your CarbonFlow login ID and cannot be changed, so think of one that is secure and easy to remember. 6. Create a [employer]Flow password. You can change your password at any time. 7. Enter your Password Reset Question and Answer.  This can be used at a later time if you forget your password. 8. Enter your e-mail address. You will receive e-mail notification when new information is available in [address] 9. Click Sign Up. You can now view and download portions of your medical record. 10. Click the Download Summary menu link to download a portable copy of your medical information. If you have questions, please visit the Frequently Asked Questions section of the [employer] website. Remember, [employer] is NOT to be used for urgent needs. For medical emergencies, dial 911. Now available from your iPhone and Android! Please provide this summary of care documentation to your next provider. Your primary care clinician is listed as Yandy Shore. If you have any questions after today's visit, please call [phone].

## 2017-07-14 NOTE — DISCHARGE SUMMARY
Bariatric Surgery Discharge Progress Note    Admission Date: 7/11/2017    Discharge Date: 7/12/2017      Admission Diagnosis:    Clinically severe Obesity    Comorbidities:  no significant previous medical problems    Discharge Diagnosis:     Clinically Severe Obesity, s/p laparoscopic gastric bypass surgery with comorbidities as listed above    Procedures:   laparoscopic gastric bypass surgery    Postop Complications: none    Hospital Course:  Patient was admitted on 7/11/2017 for scheduled bariatric surgery. Operation was without significant complication. Patient admitted to the floor postoperatively, monitored as per protocol. Diet sequentially advanced beginning POD 1, pain medications transitioned to oral during the hospital course. At the time of discharge, the patient is afebrile, vital signs stable, tolerating a clear liquid diet with protein supplementation, voiding spontaneously, ambulatory with adequate pain control with oral medications and clear surgical sites without evidence of infection.     Discharge Diet:  Clear Liquid Bariatric Diet for 7 days, then soft moist protein diet for 5 weeks    Discharge Medications:   *All medications as per Medical Reconciliation Form\"    Flintstones Complete Chewable vitamins, 2 orally daily for life  Calcium Citrate 2000mg orally daily for life  Vitamin B12 1000micrograms sublingual daily for life  Oxycodone 5mg tab 1-2 by mouth every 4-6 hours as needed for pain   Enoxaparin (Lovenox) 40mg sub-Q daily for 7 days    Discharge disposition: home          Local wound care with daily showers, keep wounds clean and dry    Activity: as desired, no lifting greater than 15lbs or situps for 30 days    Special Instructions:   No driving until activity is not influenced by incisional pain and off narcotics   No bath or hot tub until wounds are healed   Pulse and temperature twice daily for 10 days   Notify [employer] for a Temp >100.5 or Pulse>115    Followup with surgeon in 2 weeks

## 2017-07-19 ENCOUNTER — TELEPHONE (OUTPATIENT)
Dept: SURGERY | Age: 32
End: 2017-07-19

## 2017-08-09 ENCOUNTER — OFFICE VISIT (OUTPATIENT)
Dept: SURGERY | Age: 32
End: 2017-08-09

## 2017-08-09 VITALS
SYSTOLIC BLOOD PRESSURE: 96 MMHG | HEART RATE: 92 BPM | BODY MASS INDEX: 39.2 KG/M2 | RESPIRATION RATE: 18 BRPM | DIASTOLIC BLOOD PRESSURE: 74 MMHG | WEIGHT: 280 LBS | HEIGHT: 71 IN | TEMPERATURE: 98 F

## 2017-08-09 DIAGNOSIS — R79.89 LOW VITAMIN D LEVEL: Primary | ICD-10-CM

## 2017-08-09 RX ORDER — ZOLPIDEM TARTRATE 10 MG/1
TABLET ORAL
COMMUNITY
Start: 2017-07-10

## 2017-08-09 NOTE — PROGRESS NOTES
Pt ID confirmed    Weight Loss Metrics 8/9/2017 8/9/2017 7/13/2017 7/11/2017 6/29/2017 6/29/2017 6/28/2017   Pre op / Initial Wt 320 - - - 320 - -   Today's Wt - 280 lb 319 lb - - 320 lb 320 lb   BMI - 39.05 kg/m2 44.49 kg/m2 44.63 kg/m2 - 44.63 kg/m2 -   Ideal Body Wt 152 - - - 152 - -   Excess Body Wt 168 - - - 168 - -   Goal Wt 186 - - - 186 - -   Wt loss to date 40 - - - 0 - -   % Wt Loss 0.3 - - - 0 - -   80% .4 - - - 134.4 - -       Body mass index is 39.05 kg/(m^2).     Post op medications:  MVI: 2x  Calcium Citrate: 1000 will increase  Actigal 0  B-12 1000 micrograms  Vit D 3 5000 units

## 2017-08-18 ENCOUNTER — DOCUMENTATION ONLY (OUTPATIENT)
Dept: SURGERY | Age: 32
End: 2017-08-18

## 2017-08-18 NOTE — PROGRESS NOTES
Received telephone call from Juan Guillen PA-C, emergency room provider in [place] regarding Ms. Moraima Abraham presenting to emergency room while on vacation. She is currently 3 weeks status post gastric bypass, and lifted heavy luggage, resulting in pain in the lower abdomen and upper back. She is otherwise well. Ms. Lorena Hernandez notes that the patient is stable, has normal labs, as well as a normal CT scan. Her diagnosis is musculoskeletal strain, and she is calling for advice on treatment. I applauded Ms. Lorena Hernandez for calling and notifying us, and thanked her for caring for our patient and advised that she abstain from prescribing NSAIDs, but that APAP, or narcotics if necessary are appropriate.   Patient to keep usual follow-up visit with us as scheduled  Vivian Miranda PA-C

## 2017-08-21 NOTE — COMMUNICATION BODY
Subjective:      Maria Ines Hall is a 29 y.o. female is now 2 weeks status post laparoscopic gastric bypass surgery. Doing well overall. Currently on a stage 3 diet without difficulty. Taking in 60oz water,  60g protein. 20min of activity daily. Bowel movements are regular. The patient is not having any pain. .  The patient is compliant with multivitamins, calcium and B12 supplements. Weight Loss Metrics 2017   Pre op / Initial Wt 320 - - - 320 - -   Today's Wt - 280 lb 319 lb - - 320 lb 320 lb   BMI - 39.05 kg/m2 44.49 kg/m2 44.63 kg/m2 - 44.63 kg/m2 -   Ideal Body Wt 152 - - - 152 - -   Excess Body Wt 168 - - - 168 - -   Goal Wt 186 - - - 186 - -   Wt loss to date 40 - - - 0 - -   % Wt Loss 0.3 - - - 0 - -   80% .4 - - - 134.4 - -       Body mass index is 39.05 kg/(m^2). Comorbidities:    Hypertension: not applicable  Diabetes: not applicable  Obstructive Sleep Apnea: not applicable  Hyperlipidemia: not applicable  Stress Urinary Incontinence: not applicable  Gastroesophageal Reflux: not applicable  Weight related arthropathy:improved        Past Medical History:   Diagnosis Date    Anxiety     Ill-defined condition     HPV    Plantar fasciitis     Spondylosis     UTI (urinary tract infection)        Past Surgical History:   Procedure Laterality Date    HX APPENDECTOMY      HX  SECTION      HX GASTRIC BYPASS  2017    HX LIPOMA RESECTION      HX OTHER SURGICAL      plantar faciitis  repair    HX SHOULDER ARTHROSCOPY      cartilage repair    HX TONSILLECTOMY         Current Outpatient Prescriptions   Medication Sig Dispense Refill    zolpidem (AMBIEN) 10 mg tablet       MULTIVIT-MINERALS/FERROUS FUM (MULTI VITAMIN PO) Take 1 Tab by mouth two (2) times a day.  cyanocobalamin 1,000 mcg tablet Take 1,000 mcg by mouth daily.       ALPRAZolam (XANAX) 2 mg tablet TK 1 T PO BID PRF ANXIETY  2    cholecalciferol (VITAMIN D3) 1,000 unit cap Take 5,000 Units by mouth daily.  ondansetron (ZOFRAN ODT) 4 mg disintegrating tablet Take 1 Tab by mouth every six (6) hours as needed. Indications: PREVENTION OF POST-OPERATIVE NAUSEA AND VOMITING 30 Tab 0    gabapentin (NEURONTIN) 800 mg tablet Take  by mouth three (3) times daily. Allergies   Allergen Reactions    Prednisone Other (comments)     Makes very extreme aggression    Chantix [Varenicline] Other (comments)     syncope    Nsaids (Non-Steroidal Anti-Inflammatory Drug) Other (comments)     No after bariatric sx         Objective:     Visit Vitals    BP 96/74    Pulse 92    Temp 98 °F (36.7 °C)    Resp 18    Ht 5' 11\" (1.803 m)    Wt 127 kg (280 lb)    BMI 39.05 kg/m2       General:  alert, cooperative, no distress, appears stated age   Chest: no accessory muscle use   Cor:   Regular rate and rhythm   Abdomen: soft, bowel sounds active, non-tender   Incision:   healing well, no drainage, no erythema, no hernia, no seroma, no swelling, no dehiscence, incision well approximated       Labs: None    Assessment:     Doing well postoperatively.     Plan:     Increase activity to the goal of 30 minutes daily, Follow up labs as ordered, Increase fluids, Follow up with Registered Dietician and Continue MVI/Ca/B12 supplementation  Follow up in 3 months

## 2017-08-21 NOTE — PROGRESS NOTES
Subjective:      Chastity Kirk is a 29 y.o. female is now 2 weeks status post laparoscopic gastric bypass surgery. Doing well overall. Currently on a stage 3 diet without difficulty. Taking in 60oz water,  60g protein. 20min of activity daily. Bowel movements are regular. The patient is not having any pain. .  The patient is compliant with multivitamins, calcium and B12 supplements. Weight Loss Metrics 2017   Pre op / Initial Wt 320 - - - 320 - -   Today's Wt - 280 lb 319 lb - - 320 lb 320 lb   BMI - 39.05 kg/m2 44.49 kg/m2 44.63 kg/m2 - 44.63 kg/m2 -   Ideal Body Wt 152 - - - 152 - -   Excess Body Wt 168 - - - 168 - -   Goal Wt 186 - - - 186 - -   Wt loss to date 40 - - - 0 - -   % Wt Loss 0.3 - - - 0 - -   80% .4 - - - 134.4 - -       Body mass index is 39.05 kg/(m^2). Comorbidities:    Hypertension: not applicable  Diabetes: not applicable  Obstructive Sleep Apnea: not applicable  Hyperlipidemia: not applicable  Stress Urinary Incontinence: not applicable  Gastroesophageal Reflux: not applicable  Weight related arthropathy:improved        Past Medical History:   Diagnosis Date    Anxiety     Ill-defined condition     HPV    Plantar fasciitis     Spondylosis     UTI (urinary tract infection)        Past Surgical History:   Procedure Laterality Date    HX APPENDECTOMY      HX  SECTION      HX GASTRIC BYPASS  2017    HX LIPOMA RESECTION      HX OTHER SURGICAL      plantar faciitis  repair    HX SHOULDER ARTHROSCOPY      cartilage repair    HX TONSILLECTOMY         Current Outpatient Prescriptions   Medication Sig Dispense Refill    zolpidem (AMBIEN) 10 mg tablet       MULTIVIT-MINERALS/FERROUS FUM (MULTI VITAMIN PO) Take 1 Tab by mouth two (2) times a day.  cyanocobalamin 1,000 mcg tablet Take 1,000 mcg by mouth daily.       ALPRAZolam (XANAX) 2 mg tablet TK 1 T PO BID PRF ANXIETY  2    cholecalciferol (VITAMIN D3) 1,000 unit cap Take 5,000 Units by mouth daily.  ondansetron (ZOFRAN ODT) 4 mg disintegrating tablet Take 1 Tab by mouth every six (6) hours as needed. Indications: PREVENTION OF POST-OPERATIVE NAUSEA AND VOMITING 30 Tab 0    gabapentin (NEURONTIN) 800 mg tablet Take  by mouth three (3) times daily. Allergies   Allergen Reactions    Prednisone Other (comments)     Makes very extreme aggression    Chantix [Varenicline] Other (comments)     syncope    Nsaids (Non-Steroidal Anti-Inflammatory Drug) Other (comments)     No after bariatric sx         Objective:     Visit Vitals    BP 96/74    Pulse 92    Temp 98 °F (36.7 °C)    Resp 18    Ht 5' 11\" (1.803 m)    Wt 127 kg (280 lb)    BMI 39.05 kg/m2       General:  alert, cooperative, no distress, appears stated age   Chest: no accessory muscle use   Cor:   Regular rate and rhythm   Abdomen: soft, bowel sounds active, non-tender   Incision:   healing well, no drainage, no erythema, no hernia, no seroma, no swelling, no dehiscence, incision well approximated       Labs: None    Assessment:     Doing well postoperatively.     Plan:     Increase activity to the goal of 30 minutes daily, Follow up labs as ordered, Increase fluids, Follow up with Registered Dietician and Continue MVI/Ca/B12 supplementation  Follow up in 3 months

## 2017-08-29 NOTE — PROGRESS NOTES
Attestation:  I have personally reviewed the evaluation with the PA-C. I agree with the plan for the patient's ongoing problems and participated in the formulation of the plan of treatment which will be carried out as per above. Sandie Burrell.  Dawit Jenkins MD, FACS

## 2017-09-07 ENCOUNTER — HOSPITAL ENCOUNTER (OUTPATIENT)
Dept: BARIATRICS/WEIGHT MGMT | Age: 32
Discharge: HOME | End: 2017-09-07

## 2017-09-07 ENCOUNTER — DOCUMENTATION ONLY (OUTPATIENT)
Dept: BARIATRICS/WEIGHT MGMT | Age: 32
End: 2017-09-07

## 2017-09-07 NOTE — PROGRESS NOTES
[facility]  POST-OP NUTRITION FOLLOW UP    Patient's Name: Jojo Adame  YOB: 1985  Surgery Date: 7/11/17    Procedure: Gastric bypass    Height: 5 f 11   Pre-Op Weight: 320   Current Weight: 264  Weight Lost: 56  BMI:  36.7    Attendance of support group:   When:   Why not:     Complications  Readmittance: None  Reoperations: None  Complications: Dehydration  IV Fluids: Yes. She states she had to have several bags of fluid  ER Trips: Yes at the end of July for dehydration. Problem Areas:   Nausea: Yes   Vomiting: Yes. States she is always throwing up. She states she keeps a bucket next to the bed. Patient states she is only eating a small amount and chewing thoroughly    Dumping Syndrome: Yes. Patient states she ate 3 ounces of tan beans and got sick. Inadequate Protein: Yes. Patient is not drinking the shakes. Inadequate Fluids: None  Food Intolerance: None  Hunger: None  Constipation: Early on, but this has improved. Eating 3 Meals/Day: Yes  Portion Size at Meals:  1-2 ounces   Protein from Food:     Foods being consumed:  Breakfast: Time:Timing varies: 8 am:  Patient was doing eggs. She may eat leftovers from the night before. May be [place] casserole or a bite of salad. Lunch: Time: 12:00 pm:   [place] ([facility]) with gravy. Fresh chicken. Beans. Some vegetables  Dinner:  Time: 8:00 pm:   Soft protein  In-between eating: Very rarely, if she missed a meal.      Length of time for meals: 15 minutes  food/fluids: 30/30    Fluids:  80 oz/day Types of Fluids: Water, Diet Lemonade     MVI: flintstones   Number/Day: 1 x a day, states she does not like these Taken Separately:     Calcium: Chewables  Calcium Dosing: tid  Taken Separately: yes    Vitamin B12: sublingual Vitamin B12 Dosing: Taking it, but states it comes back up. Recommended the liquid b12. Vitamin D:   Vitamin D dosing: Taking it, but states it makes her sick.     Iron: Not taking     Protein Supplement: Not taking    Grams of Protein:    Mixed with:    Splitting Protein Drink in 1/2:    Timing of Protein Drinks:   Patient is taking  days a week. Exercise (FITT): Patient is doing exercise videos at home. She is also planning on joining the [facility]. Comments:    Patient is doing okay. She states that she is throwing up and is nauseated a lot. Patient states she is eating very small amounts and taking her time, but when I saw her at her 10 days visit, she was eating 3 ounces at that point. I have reinforced to patient what her portions should be and she should be focusing on meat and vegetables only. Patient should be taking 20-30 minutes to eat, chewing food thoroughly. She is getting adequate fluid. Patient is not taking her protein shakes, but is going to try the Premier clear. She is struggling with Flintstones, I have recommended her to try swallowing the pills. She is struggling with the b12 and vitamin d. Suggested the liquid b12. She felt that is something that may be more tolerable. Patient was educated on the importance of eating meat and vegetables only. I have talked with patient about the effects of carbohydrates, not only from a weight management perspective, but also what effects it could have on their blood sugar and what reactive hypoglycemia is.         Diet Follow Up:  9 month class scheduled for: April 6 at [address] am    Natalie Jackson RD    9/7/2017

## 2017-09-13 ENCOUNTER — TELEPHONE (OUTPATIENT)
Dept: SURGERY | Age: 32
End: 2017-09-13

## 2017-09-13 NOTE — TELEPHONE ENCOUNTER
Patient called [facility] office with nausea, vomiting and intolerance of po. She states that she can't keep anything down. She has been feeling poorly for 3 days but did not contact our office because she was busy with football and cheer practice with her kids. She notes that she is dizzy and thinks she may have passed out earlier. She does not have a way to get to our office. I have advised her to proceed to the nearest ED for IVF hydration and further care. She is not to drive herself.

## 2017-09-14 ENCOUNTER — HOSPITAL ENCOUNTER (OUTPATIENT)
Dept: INFUSION THERAPY | Age: 32
End: 2017-09-14

## 2017-09-14 ENCOUNTER — DOCUMENTATION ONLY (OUTPATIENT)
Dept: SURGERY | Age: 32
End: 2017-09-14

## 2017-09-15 ENCOUNTER — APPOINTMENT (OUTPATIENT)
Dept: CT IMAGING | Age: 32
DRG: 871 | End: 2017-09-15
Attending: EMERGENCY MEDICINE
Payer: OTHER GOVERNMENT

## 2017-09-15 ENCOUNTER — TELEPHONE (OUTPATIENT)
Dept: SURGERY | Age: 32
End: 2017-09-15

## 2017-09-15 ENCOUNTER — HOSPITAL ENCOUNTER (INPATIENT)
Age: 32
LOS: 3 days | Discharge: HOME | DRG: 871 | End: 2017-09-18
Attending: EMERGENCY MEDICINE | Admitting: FAMILY MEDICINE
Payer: OTHER GOVERNMENT

## 2017-09-15 ENCOUNTER — HOSPITAL ENCOUNTER (OUTPATIENT)
Dept: INFUSION THERAPY | Age: 32
End: 2017-09-15

## 2017-09-15 DIAGNOSIS — E87.6 HYPOKALEMIA: ICD-10-CM

## 2017-09-15 DIAGNOSIS — R11.2 NAUSEA AND VOMITING, INTRACTABILITY OF VOMITING NOT SPECIFIED, UNSPECIFIED VOMITING TYPE: ICD-10-CM

## 2017-09-15 DIAGNOSIS — T39.1X1A TYLENOL OVERDOSE, ACCIDENTAL OR UNINTENTIONAL, INITIAL ENCOUNTER: Primary | ICD-10-CM

## 2017-09-15 DIAGNOSIS — R79.89 ELEVATED LFTS: ICD-10-CM

## 2017-09-15 DIAGNOSIS — R10.84 ABDOMINAL PAIN, GENERALIZED: ICD-10-CM

## 2017-09-15 PROBLEM — R55 SYNCOPE AND COLLAPSE: Status: ACTIVE | Noted: 2017-09-15

## 2017-09-15 PROBLEM — R10.9 ABDOMINAL PAIN: Status: ACTIVE | Noted: 2017-09-15

## 2017-09-15 PROBLEM — R11.10 VOMITING: Status: ACTIVE | Noted: 2017-09-15

## 2017-09-15 LAB
ALBUMIN SERPL-MCNC: 3.1 G/DL (ref 3.4–5)
ALBUMIN/GLOB SERPL: 0.9 {RATIO} (ref 0.8–1.7)
ALP SERPL-CCNC: 84 U/L (ref 45–117)
ALT SERPL-CCNC: 73 U/L (ref 13–56)
ANION GAP SERPL CALC-SCNC: 13 MMOL/L (ref 3–18)
APAP SERPL-MCNC: 4 UG/ML (ref 10–30)
APTT PPP: 35 SEC (ref 23–36.4)
AST SERPL-CCNC: 57 U/L (ref 15–37)
BASOPHILS # BLD: 0.1 K/UL (ref 0–0.06)
BASOPHILS NFR BLD: 1 % (ref 0–2)
BILIRUB SERPL-MCNC: 1.2 MG/DL (ref 0.2–1)
BUN SERPL-MCNC: 12 MG/DL (ref 7–18)
BUN/CREAT SERPL: 19 (ref 12–20)
CALCIUM SERPL-MCNC: 8.9 MG/DL (ref 8.5–10.1)
CHLORIDE SERPL-SCNC: 102 MMOL/L (ref 100–108)
CO2 SERPL-SCNC: 24 MMOL/L (ref 21–32)
CREAT SERPL-MCNC: 0.64 MG/DL (ref 0.6–1.3)
DIFFERENTIAL METHOD BLD: ABNORMAL
EOSINOPHIL # BLD: 0.1 K/UL (ref 0–0.4)
EOSINOPHIL NFR BLD: 2 % (ref 0–5)
ERYTHROCYTE [DISTWIDTH] IN BLOOD BY AUTOMATED COUNT: 13.7 % (ref 11.6–14.5)
GLOBULIN SER CALC-MCNC: 3.4 G/DL (ref 2–4)
GLUCOSE SERPL-MCNC: 87 MG/DL (ref 74–99)
HCG SERPL QL: NEGATIVE
HCT VFR BLD AUTO: 44.1 % (ref 35–45)
HGB BLD-MCNC: 14.9 G/DL (ref 12–16)
INR PPP: 1.2 (ref 0.8–1.2)
LACTATE BLD-SCNC: 0.9 MMOL/L (ref 0.4–2)
LIPASE SERPL-CCNC: 98 U/L (ref 73–393)
LYMPHOCYTES # BLD: 2.4 K/UL (ref 0.9–3.6)
LYMPHOCYTES NFR BLD: 47 % (ref 21–52)
MAGNESIUM SERPL-MCNC: 1.7 MG/DL (ref 1.6–2.6)
MCH RBC QN AUTO: 29.3 PG (ref 24–34)
MCHC RBC AUTO-ENTMCNC: 33.8 G/DL (ref 31–37)
MCV RBC AUTO: 86.6 FL (ref 74–97)
MONOCYTES # BLD: 0.4 K/UL (ref 0.05–1.2)
MONOCYTES NFR BLD: 7 % (ref 3–10)
NEUTS SEG # BLD: 2.3 K/UL (ref 1.8–8)
NEUTS SEG NFR BLD: 43 % (ref 40–73)
PLATELET # BLD AUTO: 141 K/UL (ref 135–420)
PMV BLD AUTO: 12.4 FL (ref 9.2–11.8)
POTASSIUM SERPL-SCNC: 3.2 MMOL/L (ref 3.5–5.5)
PROT SERPL-MCNC: 6.5 G/DL (ref 6.4–8.2)
PROTHROMBIN TIME: 14.2 SEC (ref 11.5–15.2)
RBC # BLD AUTO: 5.09 M/UL (ref 4.2–5.3)
SODIUM SERPL-SCNC: 139 MMOL/L (ref 136–145)
WBC # BLD AUTO: 5.3 K/UL (ref 4.6–13.2)

## 2017-09-15 PROCEDURE — 83690 ASSAY OF LIPASE: CPT | Performed by: GENERAL PRACTICE

## 2017-09-15 PROCEDURE — 99284 EMERGENCY DEPT VISIT MOD MDM: CPT

## 2017-09-15 PROCEDURE — 74011250637 HC RX REV CODE- 250/637: Performed by: PHYSICIAN ASSISTANT

## 2017-09-15 PROCEDURE — 85025 COMPLETE CBC W/AUTO DIFF WBC: CPT | Performed by: GENERAL PRACTICE

## 2017-09-15 PROCEDURE — 96376 TX/PRO/DX INJ SAME DRUG ADON: CPT

## 2017-09-15 PROCEDURE — 96361 HYDRATE IV INFUSION ADD-ON: CPT

## 2017-09-15 PROCEDURE — 74011250636 HC RX REV CODE- 250/636: Performed by: PHYSICIAN ASSISTANT

## 2017-09-15 PROCEDURE — 74011000250 HC RX REV CODE- 250: Performed by: GENERAL PRACTICE

## 2017-09-15 PROCEDURE — 96365 THER/PROPH/DIAG IV INF INIT: CPT

## 2017-09-15 PROCEDURE — 96372 THER/PROPH/DIAG INJ SC/IM: CPT

## 2017-09-15 PROCEDURE — 74011250636 HC RX REV CODE- 250/636: Performed by: GENERAL PRACTICE

## 2017-09-15 PROCEDURE — 96366 THER/PROPH/DIAG IV INF ADDON: CPT

## 2017-09-15 PROCEDURE — 85610 PROTHROMBIN TIME: CPT | Performed by: PHYSICIAN ASSISTANT

## 2017-09-15 PROCEDURE — 80307 DRUG TEST PRSMV CHEM ANLYZR: CPT | Performed by: PHYSICIAN ASSISTANT

## 2017-09-15 PROCEDURE — 80053 COMPREHEN METABOLIC PANEL: CPT | Performed by: GENERAL PRACTICE

## 2017-09-15 PROCEDURE — 85730 THROMBOPLASTIN TIME PARTIAL: CPT | Performed by: PHYSICIAN ASSISTANT

## 2017-09-15 PROCEDURE — 96375 TX/PRO/DX INJ NEW DRUG ADDON: CPT

## 2017-09-15 PROCEDURE — 74011636320 HC RX REV CODE- 636/320: Performed by: EMERGENCY MEDICINE

## 2017-09-15 PROCEDURE — 84703 CHORIONIC GONADOTROPIN ASSAY: CPT | Performed by: EMERGENCY MEDICINE

## 2017-09-15 PROCEDURE — 65660000001 HC RM ICU INTERMED STEPDOWN

## 2017-09-15 PROCEDURE — 74177 CT ABD & PELVIS W/CONTRAST: CPT

## 2017-09-15 PROCEDURE — 83605 ASSAY OF LACTIC ACID: CPT

## 2017-09-15 PROCEDURE — 96368 THER/DIAG CONCURRENT INF: CPT

## 2017-09-15 PROCEDURE — 74011000258 HC RX REV CODE- 258: Performed by: PHYSICIAN ASSISTANT

## 2017-09-15 PROCEDURE — 74011250636 HC RX REV CODE- 250/636: Performed by: EMERGENCY MEDICINE

## 2017-09-15 PROCEDURE — 83735 ASSAY OF MAGNESIUM: CPT | Performed by: GENERAL PRACTICE

## 2017-09-15 RX ORDER — POTASSIUM CHLORIDE 20 MEQ/1
40 TABLET, EXTENDED RELEASE ORAL
Status: DISCONTINUED | OUTPATIENT
Start: 2017-09-15 | End: 2017-09-15

## 2017-09-15 RX ORDER — ONDANSETRON 2 MG/ML
4 INJECTION INTRAMUSCULAR; INTRAVENOUS
Status: COMPLETED | OUTPATIENT
Start: 2017-09-15 | End: 2017-09-15

## 2017-09-15 RX ORDER — DEXTROAMPHETAMINE SACCHARATE, AMPHETAMINE ASPARTATE, DEXTROAMPHETAMINE SULFATE AND AMPHETAMINE SULFATE 5; 5; 5; 5 MG/1; MG/1; MG/1; MG/1
30 TABLET ORAL DAILY
COMMUNITY
End: 2018-01-19 | Stop reason: ALTCHOICE

## 2017-09-15 RX ORDER — POTASSIUM CHLORIDE 1.5 G/1.77G
40 POWDER, FOR SOLUTION ORAL
Status: COMPLETED | OUTPATIENT
Start: 2017-09-15 | End: 2017-09-15

## 2017-09-15 RX ORDER — HYDROMORPHONE HYDROCHLORIDE 1 MG/ML
1 INJECTION, SOLUTION INTRAMUSCULAR; INTRAVENOUS; SUBCUTANEOUS ONCE
Status: COMPLETED | OUTPATIENT
Start: 2017-09-15 | End: 2017-09-15

## 2017-09-15 RX ORDER — THIAMINE HYDROCHLORIDE 100 MG/ML
100 INJECTION, SOLUTION INTRAMUSCULAR; INTRAVENOUS
Status: COMPLETED | OUTPATIENT
Start: 2017-09-15 | End: 2017-09-15

## 2017-09-15 RX ADMIN — POTASSIUM CHLORIDE 40 MEQ: 1.5 POWDER, FOR SOLUTION ORAL at 22:28

## 2017-09-15 RX ADMIN — HYDROMORPHONE HYDROCHLORIDE 1 MG: 1 INJECTION, SOLUTION INTRAMUSCULAR; INTRAVENOUS; SUBCUTANEOUS at 20:06

## 2017-09-15 RX ADMIN — HYDROMORPHONE HYDROCHLORIDE 1 MG: 1 INJECTION, SOLUTION INTRAMUSCULAR; INTRAVENOUS; SUBCUTANEOUS at 21:08

## 2017-09-15 RX ADMIN — SODIUM CHLORIDE 1000 ML: 900 INJECTION, SOLUTION INTRAVENOUS at 20:05

## 2017-09-15 RX ADMIN — FOLIC ACID: 5 INJECTION, SOLUTION INTRAMUSCULAR; INTRAVENOUS; SUBCUTANEOUS at 21:05

## 2017-09-15 RX ADMIN — ONDANSETRON 4 MG: 2 INJECTION INTRAMUSCULAR; INTRAVENOUS at 20:05

## 2017-09-15 RX ADMIN — IOPAMIDOL 100 ML: 612 INJECTION, SOLUTION INTRAVENOUS at 20:57

## 2017-09-15 RX ADMIN — IOHEXOL 30 ML: 240 INJECTION, SOLUTION INTRATHECAL; INTRAVASCULAR; INTRAVENOUS; ORAL at 20:20

## 2017-09-15 RX ADMIN — THIAMINE HYDROCHLORIDE 100 MG: 100 INJECTION, SOLUTION INTRAMUSCULAR; INTRAVENOUS at 20:07

## 2017-09-15 RX ADMIN — ACETYLCYSTEINE 15000 MG: 200 INJECTION, SOLUTION INTRAVENOUS at 22:58

## 2017-09-15 NOTE — ED NOTES
The Sepsis Screening has been completed on arrival in the Emergency Department. Vital signs:  Patient Vitals for the past 4 hrs: 
 Temp Pulse Resp BP SpO2  
09/15/17 1919 98.1 °F (36.7 °C) (!) 119 15 114/83 95 %  
   
     =monitored (data validate) MAP (Calculated): 93    =calculated (manual entry) Is patient is 29y/o or older, meets 2 or more ABNORMAL VITAL SIGNS below, associated with SUSPECTED INFECTION? NO 
 
 Temperature < 96.8°F (36°C) or > 100.9°F (38.3°C)  Heart Rate > 90 beats per minute  Respiratory Rate > 20 beats per minute  BP < 90 systolic  MAP < 65 
 
IF ANSWER IS YES, CALL A CODE SEPSIS 
POC LACTIC ACID ASAP AND BEGIN NURSE DRIVEN SEPSIS ORDERS WHILE AWAITING PROVIDER

## 2017-09-15 NOTE — ED NOTES
This patient presents with constant lower sharp abdominal pain. Had bariatric surgery in July of this year. Referred to ED for evaluation by bariatric surgeon. Labs and banana bag ordered. Transferred to main ED for work-up.

## 2017-09-15 NOTE — Clinical Note
Status[de-identified] Inpatient [101] Type of Bed: Stepdown [17] Inpatient Hospitalization Certified Necessary for the Following Reasons: 3. Patient receiving treatment that can only be provided in an inpatient setting (further clarification in H&P documentation) Admitting Diagnosis: Abdominal pain [235798] Admitting Diagnosis: Elevated LFTs [3864021] Admitting Diagnosis: Tylenol overdose [4278486] Admitting Diagnosis: Hypokalemia [588156] Admitting Diagnosis: Vomiting [209682] Admitting Physician: Du Carrillo Attending Physician: Du Carrillo Estimated Length of Stay: 2 Midnights Discharge Plan[de-identified] Home with Office Follow-up

## 2017-09-15 NOTE — IP AVS SNAPSHOT
Marva Jose Luisjessi 
 
 
 [address] [place] [address] 
[phone] Patient: Jojo Adame MRN: [id number] MCO:[birth year] You are allergic to the following Allergen Reactions Prednisone Other (comments) Makes very extreme aggression Chantix (Varenicline) Other (comments)  
 syncope Nsaids (Non-Steroidal Anti-Inflammatory Drug) Other (comments) No after bariatric sx Recent Documentation Height Weight Breastfeeding? BMI OB Status Smoking Status 1.803 m 119.4 kg No 36.72 kg/m2 Implant Former Smoker Unresulted Labs Order Current Status BENZODIAZEPINE, UR, CONFIRM In process OPIATES, UR In process CULTURE, BLOOD Preliminary result CULTURE, BLOOD Preliminary result CULTURE, RESPIRATORY/SPUTUM/BRONCH W GRAM STAIN Preliminary result Emergency Contacts Name Discharge Info Relation Home Work Mobile [address]  Spouse [3] [phone] Renata Lujan DISCHARGE CAREGIVER [3] Mother [14]   [phone] About your hospitalization You were admitted on:  September 15, 2017 You last received care in the:  [facility] [address] You were discharged on:  September 18, 2017 Unit phone number:  [phone] Why you were hospitalized Your primary diagnosis was:  Abdominal Pain Your diagnoses also included:  Hypokalemia, Vomiting, Elevated Lfts, Tylenol Overdose, Syncope And Collapse, Myalgia, Syncope, Hyperbilirubinemia, History Of Gastric Bypass, Sepsis Secondary To Uti (Hcc), Pneumonia, Esophageal Candidiasis (Hcc) Providers Seen During Your Hospitalizations Provider Role Specialty Primary office phone Nita Prajapati MD Attending Provider Emergency Medicine [phone] Jazmín Zamarripa MD Attending Provider [facility] [phone] Kalie Moralez DO Attending Provider Internal Medicine [phone]  Marci Carpenter DO Attending Provider Internal Medicine [phone] Your Primary Care Physician (PCP) Primary Care Physician Office Phone Office Fax Kate [place] [phone] Follow-up Information Follow up With Details Comments Contact Info Jasbir Prather MD On 9/21/2017 3pm  3060 [address] [facility] [address] [facility] 
[phone] Sharlene Kan MD Schedule an appointment as soon as possible for a visit in 2 weeks  [address] [facility] [address]  [facility] [place] [address] 
[phone] Current Discharge Medication List  
  
START taking these medications Dose & Instructions Dispensing Information Comments Morning Noon Evening Bedtime  
 fluconazole 200 mg tablet Commonly known as:  DIFLUCAN Start taking on:  9/19/2017 Your last dose was: Your next dose is:    
   
   
 Dose:  200 mg Take 1 Tab by mouth daily for 7 days. FDA advises cautious prescribing of oral fluconazole in pregnancy. Quantity:  7 Tab Refills:  0  
     
   
   
   
  
 levoFLOXacin 750 mg tablet Commonly known as:  Allie Danes Start taking on:  9/20/2017 Your last dose was: Your next dose is:    
   
   
 Dose:  750 mg Take 1 Tab by mouth every twenty-four (24) hours for 7 days. Quantity:  7 Tab Refills:  0  
     
   
   
   
  
 oxyCODONE IR 5 mg immediate release tablet Commonly known as:  Corky Concepción Your last dose was: Your next dose is:    
   
   
 Dose:  5 mg Take 1 Tab by mouth every six (6) hours as needed for Pain. Max Daily Amount: 20 mg.  
 Quantity:  12 Tab Refills:  0  
     
   
   
   
  
 pantoprazole 40 mg tablet Commonly known as:  PROTONIX Your last dose was: Your next dose is:    
   
   
 Dose:  40 mg Take 1 Tab by mouth Before breakfast and dinner. Quantity:  60 Tab Refills:  2  
     
   
   
   
  
 sucralfate 1 gram tablet Commonly known as:  Jared Miller  
 Your last dose was: Your next dose is:    
   
   
 Dose:  1 g Take 1 Tab by mouth Before breakfast, lunch, dinner and at bedtime. Quantity:  112 Tab Refills:  2 CONTINUE these medications which have NOT CHANGED Dose & Instructions Dispensing Information Comments Morning Noon Evening Bedtime ADDERALL 20 mg tablet Generic drug:  dextroamphetamine-amphetamine Your last dose was: Your next dose is:    
   
   
 Dose:  20 mg Take 20 mg by mouth. Refills:  0 ALPRAZolam 2 mg tablet Commonly known as:  Inez Peppers Your last dose was: Your next dose is:    
   
   
 TK 1 T PO BID PRF ANXIETY Refills:  2  
     
   
   
   
  
 cyanocobalamin 1,000 mcg tablet Your last dose was: Your next dose is:    
   
   
 Dose:  1000 mcg Take 1,000 mcg by mouth daily. Refills:  0  
     
   
   
   
  
 EFFEXOR XR PO Your last dose was: Your next dose is: Take  by mouth. Refills:  0  
     
   
   
   
  
 gabapentin 800 mg tablet Commonly known as:  NEURONTIN Your last dose was: Your next dose is: Take  by mouth three (3) times daily. Refills:  0 MULTI VITAMIN PO Your last dose was: Your next dose is:    
   
   
 Dose:  1 Tab Take 1 Tab by mouth two (2) times a day. Refills:  0  
     
   
   
   
  
 ondansetron 4 mg disintegrating tablet Commonly known as:  ZOFRAN ODT Your last dose was: Your next dose is:    
   
   
 Dose:  4 mg Take 1 Tab by mouth every six (6) hours as needed. Indications: PREVENTION OF POST-OPERATIVE NAUSEA AND VOMITING Quantity:  30 Tab Refills:  0  
     
   
   
   
  
 VITAMIN D3 1,000 unit Cap Generic drug:  cholecalciferol Your last dose was: Your next dose is:    
   
   
 Dose:  5000 Units Take 5,000 Units by mouth daily. Refills:  0  
     
   
   
   
  
 zolpidem 10 mg tablet Commonly known as:  AMBIEN Your last dose was: Your next dose is:    
   
   
  Refills:  0 Where to Get Your Medications These medications were sent to [facility] [address]  [address], [address] [place] [address] Phone:  [phone]  
  fluconazole 200 mg tablet  
 levoFLOXacin 750 mg tablet  
 pantoprazole 40 mg tablet  
 sucralfate 1 gram tablet Information on where to get these meds will be given to you by the nurse or doctor. ! Ask your nurse or doctor about these medications  
  oxyCODONE IR 5 mg immediate release tablet Discharge Instructions Abdominal Pain: Care Instructions Your Care Instructions Abdominal pain has many possible causes. Some aren't serious and get better on their own in a few days. Others need more testing and treatment. If your pain continues or gets worse, you need to be rechecked and may need more tests to find out what is wrong. You may need surgery to correct the problem. Don't ignore new symptoms, such as fever, nausea and vomiting, urination problems, pain that gets worse, and dizziness. These may be signs of a more serious problem. Your doctor may have recommended a follow-up visit in the next 8 to 12 hours. If you are not getting better, you may need more tests or treatment. The doctor has checked you carefully, but problems can develop later. If you notice any problems or new symptoms, get medical treatment right away. Follow-up care is a key part of your treatment and safety. Be sure to make and go to all appointments, and call your doctor if you are having problems. It's also a good idea to know your test results and keep a list of the medicines you take. How can you care for yourself at home? · Rest until you feel better.  
· To prevent dehydration, drink plenty of fluids, enough so that your urine is light yellow or clear like water. Choose water and other caffeine-free clear liquids until you feel better. If you have kidney, heart, or liver disease and have to limit fluids, talk with your doctor before you increase the amount of fluids you drink. · If your stomach is upset, eat mild foods, such as rice, dry toast or crackers, bananas, and applesauce. Try eating several small meals instead of two or three large ones. · Wait until 48 hours after all symptoms have gone away before you have spicy foods, alcohol, and drinks that contain caffeine. · Do not eat foods that are high in fat. · Avoid anti-inflammatory medicines such as aspirin, ibuprofen (Advil, Motrin), and naproxen (Aleve). These can cause stomach upset. Talk to your doctor if you take daily aspirin for another health problem. When should you call for help? Call 911 anytime you think you may need emergency care. For example, call if: 
· You passed out (lost consciousness). · You pass maroon or very bloody stools. · You vomit blood or what looks like coffee grounds. · You have new, severe belly pain. Call your doctor now or seek immediate medical care if: 
· Your pain gets worse, especially if it becomes focused in one area of your belly. · You have a new or higher fever. · Your stools are black and look like tar, or they have streaks of blood. · You have unexpected vaginal bleeding. · You have symptoms of a urinary tract infection. These may include: 
¨ Pain when you urinate. ¨ Urinating more often than usual. 
¨ Blood in your urine. · You are dizzy or lightheaded, or you feel like you may faint. Watch closely for changes in your health, and be sure to contact your doctor if: 
· You are not getting better after 1 day (24 hours). Where can you learn more? Go to http://michelle-nabila.info/. Enter N737 in the search box to learn more about \"Abdominal Pain: Care Instructions. \" Current as of: March 20, 2017 Content Version: 11.3 © 2697-9365 [employer]. Care instructions adapted under license by [employer] (which disclaims liability or warranty for this information). If you have questions about a medical condition or this instruction, always ask your healthcare professional. [address] any warranty or liability for your use of this information. Fainting: Care Instructions Your Care Instructions When you faint, or pass out, you lose consciousness for a short time. A brief drop in blood flow to the brain often causes it. When you fall or lie down, more blood flows to your brain and you regain consciousness. Emotional stress, pain, or overheatingespecially if you have been standingcan make you faint. In these cases, fainting is usually not serious. But fainting can be a sign of a more serious problem. Your doctor may want you to have more tests to rule out other causes. The treatment you need depends on the reason why you fainted. The doctor has checked you carefully, but problems can develop later. If you notice any problems or new symptoms, get medical treatment right away. Follow-up care is a key part of your treatment and safety. Be sure to make and go to all appointments, and call your doctor if you are having problems. It's also a good idea to know your test results and keep a list of the medicines you take. How can you care for yourself at home? · Drink plenty of fluids to prevent dehydration. If you have kidney, heart, or liver disease and have to limit fluids, talk with your doctor before you increase your fluid intake. When should you call for help? Call 911 anytime you think you may need emergency care. For example, call if: 
· You have symptoms of a heart problem. These may include: ¨ Chest pain or pressure. ¨ Severe trouble breathing. ¨ A fast or irregular heartbeat. ¨ Lightheadedness or sudden weakness.  
¨ Coughing up pink, foamy mucus. 
¨ Passing out. After you call 911, the  may tell you to chew 1 adult-strength or 2 to 4 low-dose aspirin. Wait for an ambulance. Do not try to drive yourself. · You have symptoms of a stroke. These may include: 
¨ Sudden numbness, tingling, weakness, or loss of movement in your face, arm, or leg, especially on only one side of your body. ¨ Sudden vision changes. ¨ Sudden trouble speaking. ¨ Sudden confusion or trouble understanding simple statements. ¨ Sudden problems with walking or balance. ¨ A sudden, severe headache that is different from past headaches. · You passed out (lost consciousness) again. Watch closely for changes in your health, and be sure to contact your doctor if: 
· You do not get better as expected. Where can you learn more? Go to http://michelleGuidenabila.info/. Enter T493 in the search box to learn more about \"Fainting: Care Instructions. \" Current as of: March 20, 2017 Content Version: 11.3 © 3779-7659 [employer]. Care instructions adapted under license by [employer] (which disclaims liability or warranty for this information). If you have questions about a medical condition or this instruction, always ask your healthcare professional. [place] [address] any warranty or liability for your use of this information. Hypokalemia: Care Instructions Your Care Instructions Hypokalemia (say \"ay-ut-frz-DAILY-tanner-uh\") is a low level of potassium. The heart, muscles, kidneys, and nervous system all need potassium to work well. This problem has many different causes. Kidney problems, diet, and medicines like diuretics and laxatives can cause it. So can vomiting or diarrhea. In some cases, cancer is the cause. Your doctor may do tests to find the cause of your low potassium levels. You may need medicines to bring your potassium levels back to normal. You may also need regular blood tests to check your potassium. If you have very low potassium, you may need intravenous (IV) medicines. You also may need tests to check the electrical activity of your heart. Heart problems caused by low potassium levels can be very serious. Follow-up care is a key part of your treatment and safety. Be sure to make and go to all appointments, and call your doctor if you are having problems. It's also a good idea to know your test results and keep a list of the medicines you take. How can you care for yourself at home? · If your doctor recommends it, eat foods that have a lot of potassium. These include fresh fruits, juices, and vegetables. They also include nuts, beans, and milk. · Be safe with medicines. If your doctor prescribes medicines or potassium supplements, take them exactly as directed. Call your doctor if you have any problems with your medicines. · Get your potassium levels tested as often as your doctor tells you. When should you call for help? Call 911 anytime you think you may need emergency care. For example, call if: 
· You feel like your heart is missing beats. Heart problems caused by low potassium can cause death. · You passed out (lost consciousness). · You have a seizure. Call your doctor now or seek immediate medical care if: 
· You feel weak or unusually tired. · You have severe arm or leg cramps. · You have tingling or numbness. · You feel sick to your stomach, or you vomit. · You have belly cramps. · You feel bloated or constipated. · You have to urinate a lot. · You feel very thirsty most of the time. · You are dizzy or lightheaded, or you feel like you may faint. · You feel depressed, or you lose touch with reality. Watch closely for changes in your health, and be sure to contact your doctor if: 
· You do not get better as expected. Where can you learn more? Go to http://michelle-nabila.info/. Enter G358 in the search box to learn more about \"Hypokalemia: Care Instructions. \" Current as of: July 28, 2016 Content Version: 11.3 © 8268-6937 [employer]. Care instructions adapted under license by [employer] (which disclaims liability or warranty for this information). If you have questions about a medical condition or this instruction, always ask your healthcare professional. [address] any warranty or liability for your use of this information. Discharge Orders None [email or web address] Announcement We are excited to announce that we are making your provider's discharge notes available to you in [email or web address]. You will see these notes when they are completed and signed by the physician that discharged you from your recent hospital stay. If you have any questions or concerns about any information you see in [email or web address], please call the Health Information Department where you were seen or reach out to your Primary Care Provider for more information about your plan of care. Introducing [facility]! Felipa Rubalcava introduces [email or web address] patient portal. Now you can access parts of your medical record, email your doctor's office, and request medication refills online. 1. In your internet browser, go to [email or web address] 2. Click on the First Time User? Click Here link in the Sign In box. You will see the New Member Sign Up page. 3. Enter your [email or web address] Access Code exactly as it appears below. You will not need to use this code after youve completed the sign-up process. If you do not sign up before the expiration date, you must request a new code. · [email or web address] Access Code: [email or web address] Expires: 11/7/2017  2:56 PM 
 
4. Enter the last four digits of your Social Security Number (xxxx) and Date of Birth (mm/dd/yyyy) as indicated and click Submit. You will be taken to the next sign-up page. 5. Create a [email or web address] ID.  This will be your [email or web address] login ID and cannot be changed, so think of one that is secure and easy to remember. 6. Create a [employer] password. You can change your password at any time. 7. Enter your Password Reset Question and Answer. This can be used at a later time if you forget your password. 8. Enter your e-mail address. You will receive e-mail notification when new information is available in [address] 9. Click Sign Up. You can now view and download portions of your medical record. 10. Click the Download Summary menu link to download a portable copy of your medical information. If you have questions, please visit the Frequently Asked Questions section of the [employer] website. Remember, [employer] is NOT to be used for urgent needs. For medical emergencies, dial 911. Now available from your iPhone and Android! General Information Please provide this summary of care documentation to your next provider. Patient Signature:  ____________________________________________________________ Date:  ____________________________________________________________  
  
[facility] Provider Signature:  ____________________________________________________________ Date:  ____________________________________________________________

## 2017-09-15 NOTE — TELEPHONE ENCOUNTER
Per Dr Major Hollingsworth, spoke with pt, advised her to take a taxi to outpatient IV center due to pt not feeling well, pt states she will try to get a hold of her friend Marcus Morrow and if not she would get a taxi, Dr Major Hollingsworth trying to avoid pt being admitted and get her IV fluids, explained I would call her in 15-20 minutes to see if she got a hold of her friend or taking a taxi, pt verbally understood-EmmazLCHER

## 2017-09-15 NOTE — ED TRIAGE NOTES
Patient states Dr. Brandy Collet told her to come to ER tonight. C/o nausea and vomiting, generalized body aches, and constant sharp abdominal pain x4 days.

## 2017-09-15 NOTE — IP AVS SNAPSHOT
Janny Crowley 
 
 
 [address] [place] [address] 
[phone] Patient: Tray Puga MRN: [id number] KUQ:0/2/2416 Current Discharge Medication List  
  
START taking these medications Dose & Instructions Dispensing Information Comments Morning Noon Evening Bedtime  
 fluconazole 200 mg tablet Commonly known as:  DIFLUCAN Start taking on:  9/19/2017 Your last dose was: Your next dose is:    
   
   
 Dose:  200 mg Take 1 Tab by mouth daily for 7 days. FDA advises cautious prescribing of oral fluconazole in pregnancy. Quantity:  7 Tab Refills:  0  
     
   
   
   
  
 levoFLOXacin 750 mg tablet Commonly known as:  Fidelia Mancuso Start taking on:  9/20/2017 Your last dose was: Your next dose is:    
   
   
 Dose:  750 mg Take 1 Tab by mouth every twenty-four (24) hours for 7 days. Quantity:  7 Tab Refills:  0  
     
   
   
   
  
 oxyCODONE IR 5 mg immediate release tablet Commonly known as:  Major Joslyn Your last dose was: Your next dose is:    
   
   
 Dose:  5 mg Take 1 Tab by mouth every six (6) hours as needed for Pain. Max Daily Amount: 20 mg.  
 Quantity:  12 Tab Refills:  0  
     
   
   
   
  
 pantoprazole 40 mg tablet Commonly known as:  PROTONIX Your last dose was: Your next dose is:    
   
   
 Dose:  40 mg Take 1 Tab by mouth Before breakfast and dinner. Quantity:  60 Tab Refills:  2  
     
   
   
   
  
 sucralfate 1 gram tablet Commonly known as:  Dolores Prashanth Your last dose was: Your next dose is:    
   
   
 Dose:  1 g Take 1 Tab by mouth Before breakfast, lunch, dinner and at bedtime. Quantity:  112 Tab Refills:  2 CONTINUE these medications which have NOT CHANGED Dose & Instructions Dispensing Information Comments Morning Noon Evening Bedtime ADDERALL 20 mg tablet Generic drug: dextroamphetamine-amphetamine Your last dose was: Your next dose is:    
   
   
 Dose:  20 mg Take 20 mg by mouth. Refills:  0 ALPRAZolam 2 mg tablet Commonly known as:  Lamount Marten Your last dose was: Your next dose is:    
   
   
 TK 1 T PO BID PRF ANXIETY Refills:  2  
     
   
   
   
  
 cyanocobalamin 1,000 mcg tablet Your last dose was: Your next dose is:    
   
   
 Dose:  1000 mcg Take 1,000 mcg by mouth daily. Refills:  0  
     
   
   
   
  
 EFFEXOR XR PO Your last dose was: Your next dose is: Take  by mouth. Refills:  0  
     
   
   
   
  
 gabapentin 800 mg tablet Commonly known as:  NEURONTIN Your last dose was: Your next dose is: Take  by mouth three (3) times daily. Refills:  0 MULTI VITAMIN PO Your last dose was: Your next dose is:    
   
   
 Dose:  1 Tab Take 1 Tab by mouth two (2) times a day. Refills:  0  
     
   
   
   
  
 ondansetron 4 mg disintegrating tablet Commonly known as:  ZOFRAN ODT Your last dose was: Your next dose is:    
   
   
 Dose:  4 mg Take 1 Tab by mouth every six (6) hours as needed. Indications: PREVENTION OF POST-OPERATIVE NAUSEA AND VOMITING Quantity:  30 Tab Refills:  0  
     
   
   
   
  
 VITAMIN D3 1,000 unit Cap Generic drug:  cholecalciferol Your last dose was: Your next dose is:    
   
   
 Dose:  5000 Units Take 5,000 Units by mouth daily. Refills:  0  
     
   
   
   
  
 zolpidem 10 mg tablet Commonly known as:  AMBIEN Your last dose was: Your next dose is:    
   
   
  Refills:  0 Where to Get Your Medications [facility] [address] [place] [address]   Phone:  [phone]  
  [place] [address] mg tablet  
 levoFLOXacin 750 mg tablet  
 pantoprazole 40 mg tablet  
 sucralfate 1 gram tablet Information on where to get these meds will be given to you by the nurse or doctor. ! Ask your nurse or doctor about these medications  
  oxyCODONE IR 5 mg immediate release tablet

## 2017-09-15 NOTE — TELEPHONE ENCOUNTER
Received a call from the "[employer] Any MARKS states they are needing information on this pt in order to send a message and get the pts  home to care for kids in case she is admitted into the hospital. Called pt she is unable to get here. Called Elidia Beebe my supervisor she states if we get an email from the pt giving us permission we can then give information to the "[employer] and [employer] as needed. Carol Lara will also fax over a [employer] form for our records as well. Charles MARKS's phone number is [phone].  Case Ref number for this is: [id number]

## 2017-09-15 NOTE — TELEPHONE ENCOUNTER
Pt states she is still waiting on her friend Iza Blanco to respond to her text, states Iza Blanco is at a Dr appt for her son, pt states she will drive herself, I advised her not to drive herself, it would be better for her to take a taxi, pt states she has had a bad experience with a taxi before and will wait on her friend to respond, told her I would call and check with her again at about 1:00 PM, pt verbally understood-Kelly

## 2017-09-16 ENCOUNTER — APPOINTMENT (OUTPATIENT)
Dept: CT IMAGING | Age: 32
DRG: 871 | End: 2017-09-16
Attending: HOSPITALIST
Payer: OTHER GOVERNMENT

## 2017-09-16 ENCOUNTER — APPOINTMENT (OUTPATIENT)
Dept: ULTRASOUND IMAGING | Age: 32
DRG: 871 | End: 2017-09-16
Attending: FAMILY MEDICINE
Payer: OTHER GOVERNMENT

## 2017-09-16 PROBLEM — R55 SYNCOPE: Status: ACTIVE | Noted: 2017-09-16

## 2017-09-16 PROBLEM — E80.6 HYPERBILIRUBINEMIA: Status: ACTIVE | Noted: 2017-09-16

## 2017-09-16 PROBLEM — Z98.84 HISTORY OF GASTRIC BYPASS: Status: ACTIVE | Noted: 2017-09-16

## 2017-09-16 PROBLEM — M79.10 MYALGIA: Status: ACTIVE | Noted: 2017-09-16

## 2017-09-16 LAB
ALBUMIN SERPL-MCNC: 2.6 G/DL (ref 3.4–5)
ALBUMIN/GLOB SERPL: 0.9 {RATIO} (ref 0.8–1.7)
ALP SERPL-CCNC: 69 U/L (ref 45–117)
ALT SERPL-CCNC: 72 U/L (ref 13–56)
ANION GAP SERPL CALC-SCNC: 11 MMOL/L (ref 3–18)
APPEARANCE UR: CLEAR
AST SERPL-CCNC: 50 U/L (ref 15–37)
ATRIAL RATE: 112 BPM
BACTERIA URNS QL MICRO: ABNORMAL /HPF
BASOPHILS # BLD: 0 K/UL (ref 0–0.1)
BASOPHILS NFR BLD: 0 % (ref 0–3)
BILIRUB DIRECT SERPL-MCNC: 0.6 MG/DL (ref 0–0.2)
BILIRUB SERPL-MCNC: 1.1 MG/DL (ref 0.2–1)
BILIRUB UR QL: NEGATIVE
BUN SERPL-MCNC: 10 MG/DL (ref 7–18)
BUN/CREAT SERPL: 19 (ref 12–20)
CALCIUM SERPL-MCNC: 8 MG/DL (ref 8.5–10.1)
CALCULATED P AXIS, ECG09: 34 DEGREES
CALCULATED R AXIS, ECG10: 19 DEGREES
CALCULATED T AXIS, ECG11: 11 DEGREES
CHLORIDE SERPL-SCNC: 103 MMOL/L (ref 100–108)
CK SERPL-CCNC: 77 U/L (ref 26–192)
CO2 SERPL-SCNC: 27 MMOL/L (ref 21–32)
COLOR UR: ABNORMAL
CREAT SERPL-MCNC: 0.54 MG/DL (ref 0.6–1.3)
DIAGNOSIS, 93000: NORMAL
DIFFERENTIAL METHOD BLD: ABNORMAL
EOSINOPHIL # BLD: 0 K/UL (ref 0–0.4)
EOSINOPHIL NFR BLD: 1 % (ref 0–5)
EPITH CASTS URNS QL MICRO: ABNORMAL /LPF (ref 0–5)
ERYTHROCYTE [DISTWIDTH] IN BLOOD BY AUTOMATED COUNT: 13.9 % (ref 11.6–14.5)
EST. AVERAGE GLUCOSE BLD GHB EST-MCNC: 100 MG/DL
FLUAV AG NPH QL IA: NEGATIVE
FLUBV AG NOSE QL IA: NEGATIVE
GLOBULIN SER CALC-MCNC: 2.9 G/DL (ref 2–4)
GLUCOSE BLD STRIP.AUTO-MCNC: 160 MG/DL (ref 70–110)
GLUCOSE BLD STRIP.AUTO-MCNC: 70 MG/DL (ref 70–110)
GLUCOSE SERPL-MCNC: 88 MG/DL (ref 74–99)
GLUCOSE UR STRIP.AUTO-MCNC: NEGATIVE MG/DL
HBA1C MFR BLD: 5.1 % (ref 4.2–5.6)
HCT VFR BLD AUTO: 41.2 % (ref 35–45)
HGB BLD-MCNC: 13.6 G/DL (ref 12–16)
HGB UR QL STRIP: NEGATIVE
INR PPP: 1.3 (ref 0.8–1.2)
KETONES UR QL STRIP.AUTO: 40 MG/DL
LACTATE SERPL-SCNC: 0.5 MMOL/L (ref 0.4–2)
LEUKOCYTE ESTERASE UR QL STRIP.AUTO: ABNORMAL
LYMPHOCYTES # BLD: 1.6 K/UL (ref 0.8–3.5)
LYMPHOCYTES NFR BLD: 36 % (ref 20–51)
MCH RBC QN AUTO: 29.2 PG (ref 24–34)
MCHC RBC AUTO-ENTMCNC: 33 G/DL (ref 31–37)
MCV RBC AUTO: 88.4 FL (ref 74–97)
MONOCYTES # BLD: 0.2 K/UL (ref 0–1)
MONOCYTES NFR BLD: 4 % (ref 2–9)
MUCOUS THREADS URNS QL MICRO: ABNORMAL /LPF
NEUTS SEG # BLD: 2.7 K/UL (ref 1.8–8)
NEUTS SEG NFR BLD: 59 % (ref 42–75)
NITRITE UR QL STRIP.AUTO: NEGATIVE
P-R INTERVAL, ECG05: 136 MS
PH UR STRIP: 5.5 [PH] (ref 5–8)
PLATELET # BLD AUTO: 114 K/UL (ref 135–420)
PMV BLD AUTO: 11.6 FL (ref 9.2–11.8)
POTASSIUM SERPL-SCNC: 3.6 MMOL/L (ref 3.5–5.5)
PROT SERPL-MCNC: 5.5 G/DL (ref 6.4–8.2)
PROT UR STRIP-MCNC: NEGATIVE MG/DL
PROTHROMBIN TIME: 15.7 SEC (ref 11.5–15.2)
Q-T INTERVAL, ECG07: 340 MS
QRS DURATION, ECG06: 104 MS
QTC CALCULATION (BEZET), ECG08: 464 MS
RBC # BLD AUTO: 4.66 M/UL (ref 4.2–5.3)
RBC #/AREA URNS HPF: ABNORMAL /HPF (ref 0–5)
RBC MORPH BLD: ABNORMAL
SODIUM SERPL-SCNC: 141 MMOL/L (ref 136–145)
SP GR UR REFRACTOMETRY: 1.02 (ref 1–1.03)
UROBILINOGEN UR QL STRIP.AUTO: 1 EU/DL (ref 0.2–1)
VENTRICULAR RATE, ECG03: 112 BPM
WBC # BLD AUTO: 4.5 K/UL (ref 4.6–13.2)
WBC MORPH BLD: ABNORMAL
WBC URNS QL MICRO: ABNORMAL /HPF (ref 0–4)

## 2017-09-16 PROCEDURE — 76705 ECHO EXAM OF ABDOMEN: CPT

## 2017-09-16 PROCEDURE — 74011250636 HC RX REV CODE- 250/636: Performed by: FAMILY MEDICINE

## 2017-09-16 PROCEDURE — 77030011256 HC DRSG MEPILEX <16IN NO BORD MOLN -A

## 2017-09-16 PROCEDURE — 74011250636 HC RX REV CODE- 250/636: Performed by: PHYSICIAN ASSISTANT

## 2017-09-16 PROCEDURE — 82962 GLUCOSE BLOOD TEST: CPT

## 2017-09-16 PROCEDURE — 80346 BENZODIAZEPINES1-12: CPT | Performed by: FAMILY MEDICINE

## 2017-09-16 PROCEDURE — 93005 ELECTROCARDIOGRAM TRACING: CPT

## 2017-09-16 PROCEDURE — 93306 TTE W/DOPPLER COMPLETE: CPT

## 2017-09-16 PROCEDURE — 82550 ASSAY OF CK (CPK): CPT | Performed by: FAMILY MEDICINE

## 2017-09-16 PROCEDURE — 77030020253 HC SOL INJ D545NS .05 DEX .45 SAL

## 2017-09-16 PROCEDURE — 81001 URINALYSIS AUTO W/SCOPE: CPT | Performed by: FAMILY MEDICINE

## 2017-09-16 PROCEDURE — 74011250636 HC RX REV CODE- 250/636: Performed by: HOSPITALIST

## 2017-09-16 PROCEDURE — 87086 URINE CULTURE/COLONY COUNT: CPT | Performed by: FAMILY MEDICINE

## 2017-09-16 PROCEDURE — 87040 BLOOD CULTURE FOR BACTERIA: CPT | Performed by: FAMILY MEDICINE

## 2017-09-16 PROCEDURE — 77030020263 HC SOL INJ SOD CL0.9% LFCR 1000ML

## 2017-09-16 PROCEDURE — 74011000250 HC RX REV CODE- 250: Performed by: SURGERY

## 2017-09-16 PROCEDURE — 77030020365 HC SOL INJ SOD CL 0.9% 50ML

## 2017-09-16 PROCEDURE — 80361 OPIATES 1 OR MORE: CPT | Performed by: FAMILY MEDICINE

## 2017-09-16 PROCEDURE — C9113 INJ PANTOPRAZOLE SODIUM, VIA: HCPCS | Performed by: HOSPITALIST

## 2017-09-16 PROCEDURE — 83036 HEMOGLOBIN GLYCOSYLATED A1C: CPT | Performed by: FAMILY MEDICINE

## 2017-09-16 PROCEDURE — 83605 ASSAY OF LACTIC ACID: CPT | Performed by: HOSPITALIST

## 2017-09-16 PROCEDURE — 74011000258 HC RX REV CODE- 258: Performed by: HOSPITALIST

## 2017-09-16 PROCEDURE — 77030027138 HC INCENT SPIROMETER -A

## 2017-09-16 PROCEDURE — 36415 COLL VENOUS BLD VENIPUNCTURE: CPT | Performed by: FAMILY MEDICINE

## 2017-09-16 PROCEDURE — 74011000258 HC RX REV CODE- 258

## 2017-09-16 PROCEDURE — 80053 COMPREHEN METABOLIC PANEL: CPT | Performed by: FAMILY MEDICINE

## 2017-09-16 PROCEDURE — 74011250637 HC RX REV CODE- 250/637: Performed by: HOSPITALIST

## 2017-09-16 PROCEDURE — 65660000001 HC RM ICU INTERMED STEPDOWN

## 2017-09-16 PROCEDURE — 71275 CT ANGIOGRAPHY CHEST: CPT

## 2017-09-16 PROCEDURE — 77030032490 HC SLV COMPR SCD KNE COVD -B

## 2017-09-16 PROCEDURE — 82248 BILIRUBIN DIRECT: CPT | Performed by: FAMILY MEDICINE

## 2017-09-16 PROCEDURE — 85025 COMPLETE CBC W/AUTO DIFF WBC: CPT | Performed by: FAMILY MEDICINE

## 2017-09-16 PROCEDURE — 74011636320 HC RX REV CODE- 636/320: Performed by: HOSPITALIST

## 2017-09-16 PROCEDURE — 74011000250 HC RX REV CODE- 250: Performed by: HOSPITALIST

## 2017-09-16 PROCEDURE — 74011250636 HC RX REV CODE- 250/636: Performed by: SURGERY

## 2017-09-16 PROCEDURE — 80307 DRUG TEST PRSMV CHEM ANLYZR: CPT | Performed by: FAMILY MEDICINE

## 2017-09-16 PROCEDURE — 74011250636 HC RX REV CODE- 250/636

## 2017-09-16 PROCEDURE — 85610 PROTHROMBIN TIME: CPT | Performed by: FAMILY MEDICINE

## 2017-09-16 PROCEDURE — 87804 INFLUENZA ASSAY W/OPTIC: CPT | Performed by: FAMILY MEDICINE

## 2017-09-16 RX ORDER — DEXTROMETHORPHAN HYDROBROMIDE, GUAIFENESIN 5; 100 MG/5ML; MG/5ML
650 LIQUID ORAL EVERY 8 HOURS
Status: DISCONTINUED | OUTPATIENT
Start: 2017-09-16 | End: 2017-09-17

## 2017-09-16 RX ORDER — SODIUM CHLORIDE 9 MG/ML
1000 INJECTION, SOLUTION INTRAVENOUS ONCE
Status: COMPLETED | OUTPATIENT
Start: 2017-09-16 | End: 2017-09-16

## 2017-09-16 RX ORDER — SODIUM CHLORIDE AND POTASSIUM CHLORIDE .9; .15 G/100ML; G/100ML
SOLUTION INTRAVENOUS
Status: COMPLETED
Start: 2017-09-16 | End: 2017-09-16

## 2017-09-16 RX ORDER — SODIUM CHLORIDE 9 MG/ML
INJECTION, SOLUTION INTRAVENOUS
Status: COMPLETED
Start: 2017-09-16 | End: 2017-09-16

## 2017-09-16 RX ORDER — NALOXONE HYDROCHLORIDE 0.4 MG/ML
0.4 INJECTION, SOLUTION INTRAMUSCULAR; INTRAVENOUS; SUBCUTANEOUS AS NEEDED
Status: DISCONTINUED | OUTPATIENT
Start: 2017-09-16 | End: 2017-09-18 | Stop reason: HOSPADM

## 2017-09-16 RX ORDER — SODIUM CHLORIDE AND POTASSIUM CHLORIDE .9; .15 G/100ML; G/100ML
SOLUTION INTRAVENOUS CONTINUOUS
Status: DISCONTINUED | OUTPATIENT
Start: 2017-09-16 | End: 2017-09-16

## 2017-09-16 RX ORDER — CEFTRIAXONE 1 G/1
1 INJECTION, POWDER, FOR SOLUTION INTRAMUSCULAR; INTRAVENOUS EVERY 24 HOURS
Status: DISCONTINUED | OUTPATIENT
Start: 2017-09-16 | End: 2017-09-16

## 2017-09-16 RX ORDER — ONDANSETRON 2 MG/ML
4 INJECTION INTRAMUSCULAR; INTRAVENOUS
Status: DISCONTINUED | OUTPATIENT
Start: 2017-09-16 | End: 2017-09-18 | Stop reason: HOSPADM

## 2017-09-16 RX ORDER — SODIUM CHLORIDE 9 MG/ML
INJECTION, SOLUTION INTRAVENOUS
Status: DISPENSED
Start: 2017-09-16 | End: 2017-09-17

## 2017-09-16 RX ORDER — DEXTROSE 50 % IN WATER (D50W) INTRAVENOUS SYRINGE
25 AS NEEDED
Status: DISCONTINUED | OUTPATIENT
Start: 2017-09-16 | End: 2017-09-18 | Stop reason: HOSPADM

## 2017-09-16 RX ORDER — HEPARIN SODIUM 5000 [USP'U]/ML
5000 INJECTION, SOLUTION INTRAVENOUS; SUBCUTANEOUS EVERY 8 HOURS
Status: DISCONTINUED | OUTPATIENT
Start: 2017-09-16 | End: 2017-09-18 | Stop reason: HOSPADM

## 2017-09-16 RX ORDER — MORPHINE SULFATE 2 MG/ML
1 INJECTION, SOLUTION INTRAMUSCULAR; INTRAVENOUS ONCE
Status: COMPLETED | OUTPATIENT
Start: 2017-09-16 | End: 2017-09-16

## 2017-09-16 RX ORDER — SODIUM CHLORIDE 9 MG/ML
100 INJECTION, SOLUTION INTRAVENOUS ONCE
Status: DISPENSED | OUTPATIENT
Start: 2017-09-16 | End: 2017-09-17

## 2017-09-16 RX ORDER — MORPHINE SULFATE 2 MG/ML
2 INJECTION, SOLUTION INTRAMUSCULAR; INTRAVENOUS
Status: DISCONTINUED | OUTPATIENT
Start: 2017-09-16 | End: 2017-09-18 | Stop reason: HOSPADM

## 2017-09-16 RX ORDER — DEXTROSE MONOHYDRATE AND SODIUM CHLORIDE 5; .45 G/100ML; G/100ML
150 INJECTION, SOLUTION INTRAVENOUS CONTINUOUS
Status: DISCONTINUED | OUTPATIENT
Start: 2017-09-16 | End: 2017-09-18

## 2017-09-16 RX ORDER — PANTOPRAZOLE SODIUM 40 MG/10ML
40 INJECTION, POWDER, LYOPHILIZED, FOR SOLUTION INTRAVENOUS EVERY 12 HOURS
Status: DISCONTINUED | OUTPATIENT
Start: 2017-09-16 | End: 2017-09-17 | Stop reason: CLARIF

## 2017-09-16 RX ORDER — SODIUM CHLORIDE 9 MG/ML
1000 INJECTION, SOLUTION INTRAVENOUS CONTINUOUS
Status: DISCONTINUED | OUTPATIENT
Start: 2017-09-16 | End: 2017-09-17

## 2017-09-16 RX ADMIN — IOPAMIDOL 75 ML: 755 INJECTION, SOLUTION INTRAVENOUS at 19:51

## 2017-09-16 RX ADMIN — FOLIC ACID: 5 INJECTION, SOLUTION INTRAMUSCULAR; INTRAVENOUS; SUBCUTANEOUS at 15:59

## 2017-09-16 RX ADMIN — SODIUM CHLORIDE 2000 MG: 900 INJECTION, SOLUTION INTRAVENOUS at 13:39

## 2017-09-16 RX ADMIN — DEXTROSE MONOHYDRATE AND SODIUM CHLORIDE 150 ML/HR: 5; .45 INJECTION, SOLUTION INTRAVENOUS at 12:20

## 2017-09-16 RX ADMIN — PANTOPRAZOLE SODIUM 40 MG: 40 INJECTION, POWDER, FOR SOLUTION INTRAVENOUS at 20:55

## 2017-09-16 RX ADMIN — ACETYLCYSTEINE 10000 MG: 200 INJECTION, SOLUTION INTRAVENOUS at 04:57

## 2017-09-16 RX ADMIN — SODIUM CHLORIDE 100 ML: 900 INJECTION, SOLUTION INTRAVENOUS at 19:52

## 2017-09-16 RX ADMIN — DEXTROSE MONOHYDRATE 25 G: 25 INJECTION, SOLUTION INTRAVENOUS at 23:09

## 2017-09-16 RX ADMIN — SODIUM CHLORIDE 1000 MG: 900 INJECTION, SOLUTION INTRAVENOUS at 12:37

## 2017-09-16 RX ADMIN — HEPARIN SODIUM 5000 UNITS: 5000 INJECTION, SOLUTION INTRAVENOUS; SUBCUTANEOUS at 20:57

## 2017-09-16 RX ADMIN — MORPHINE SULFATE 2 MG: 2 INJECTION, SOLUTION INTRAMUSCULAR; INTRAVENOUS at 20:59

## 2017-09-16 RX ADMIN — MORPHINE SULFATE 2 MG: 2 INJECTION, SOLUTION INTRAMUSCULAR; INTRAVENOUS at 05:45

## 2017-09-16 RX ADMIN — HEPARIN SODIUM 5000 UNITS: 5000 INJECTION, SOLUTION INTRAVENOUS; SUBCUTANEOUS at 01:51

## 2017-09-16 RX ADMIN — SODIUM CHLORIDE 1000 ML: 900 INJECTION, SOLUTION INTRAVENOUS at 16:54

## 2017-09-16 RX ADMIN — SODIUM CHLORIDE 2000 MG: 900 INJECTION, SOLUTION INTRAVENOUS at 20:54

## 2017-09-16 RX ADMIN — SODIUM CHLORIDE AND POTASSIUM CHLORIDE: .9; .15 SOLUTION INTRAVENOUS at 01:09

## 2017-09-16 RX ADMIN — SODIUM CHLORIDE 1000 ML: 900 INJECTION, SOLUTION INTRAVENOUS at 09:17

## 2017-09-16 RX ADMIN — ACETAMINOPHEN 650 MG: 650 TABLET, FILM COATED, EXTENDED RELEASE ORAL at 13:14

## 2017-09-16 RX ADMIN — ACETYLCYSTEINE 5000 MG: 200 INJECTION, SOLUTION INTRAVENOUS at 00:01

## 2017-09-16 RX ADMIN — SODIUM CHLORIDE AND POTASSIUM CHLORIDE: 9; 1.49 INJECTION, SOLUTION INTRAVENOUS at 01:09

## 2017-09-16 RX ADMIN — PANTOPRAZOLE SODIUM 40 MG: 40 INJECTION, POWDER, FOR SOLUTION INTRAVENOUS at 12:40

## 2017-09-16 RX ADMIN — SODIUM CHLORIDE 1000 ML: 900 INJECTION, SOLUTION INTRAVENOUS at 08:24

## 2017-09-16 RX ADMIN — CEFTRIAXONE 1 G: 1 INJECTION, POWDER, FOR SOLUTION INTRAMUSCULAR; INTRAVENOUS at 12:27

## 2017-09-16 RX ADMIN — MORPHINE SULFATE 1 MG: 2 INJECTION, SOLUTION INTRAMUSCULAR; INTRAVENOUS at 07:29

## 2017-09-16 RX ADMIN — ACETAMINOPHEN 650 MG: 650 TABLET, FILM COATED, EXTENDED RELEASE ORAL at 22:34

## 2017-09-16 RX ADMIN — MORPHINE SULFATE 2 MG: 2 INJECTION, SOLUTION INTRAMUSCULAR; INTRAVENOUS at 12:04

## 2017-09-16 RX ADMIN — MORPHINE SULFATE 2 MG: 2 INJECTION, SOLUTION INTRAMUSCULAR; INTRAVENOUS at 01:45

## 2017-09-16 RX ADMIN — HEPARIN SODIUM 5000 UNITS: 5000 INJECTION, SOLUTION INTRAVENOUS; SUBCUTANEOUS at 12:33

## 2017-09-16 RX ADMIN — MORPHINE SULFATE 2 MG: 2 INJECTION, SOLUTION INTRAMUSCULAR; INTRAVENOUS at 16:59

## 2017-09-16 NOTE — PROGRESS NOTES
Physical Exam  
Constitutional: She is oriented to person, place, and time. She appears well-developed. She is active and cooperative. Eyes: Pupils are equal, round, and reactive to light. Cardiovascular: Regular rhythm, normal heart sounds and intact distal pulses. Pulmonary/Chest: Breath sounds normal.  
Abdominal: Soft. Neurological: She is alert and oriented to person, place, and time. Skin: Skin is warm and dry.

## 2017-09-16 NOTE — PROGRESS NOTES
Physical Exam  
Skin: Skin is warm and dry. Primary Nurse Mary Ann Russ RN and Jaiden Mccormick RN performed a dual skin assessment on this patient Impairment noted- see wound doc flow sheet Tomás score is 21

## 2017-09-16 NOTE — ROUTINE PROCESS
Jojo Adame   32 y.o.  1985    Full Code Next of kin: Spouse Leopoldo Wolff, [phone]; currently deployed with the [employer]. Allergies Allergen Reactions  Prednisone Other (comments) Makes very extreme aggression  Chantix [Varenicline] Other (comments)  
  syncope  Nsaids (Non-Steroidal Anti-Inflammatory Drug) Other (comments) No after bariatric sx There are currently no Active Isolations for Ms. Meghan Murdock. Treatment Team:   
Hospitalist:  Dr. Vivian Jewell, ___. Intensivist:  ?, Step-down status. Surgery:  Dr. James Doe. GI:  ____. Cardiology:  ___ . [Not yet:  OT, PT, SLP, Diabetes Mgt, Care Mgt, Palliative Care. Wound Care.  ]   * Admit for:  Abdominal pain Elevated LFTs Tylenol overdose Hypokalemia Vomiting Abdominal pain Elevated LFTs Tylenol overdose Hypokalemia Vomiting Hypokalemia Elevated LFTs Abdominal pain Myalgia Syncope Home:  Frequent tylenol for body aches. Abd pain, nausea, vomiting, diarrhea x 4 d. Weakness, fatigue. Mariano Rana at home at least x1. Called Dr. James Doe, said come to [address] ED. Started on IV mucomyst.     *   
 
LOS:  1 (09-15) Why in ICU:  SD:  MD order. LOS:  0  ()   * PMH:  Gastric bypass Richa-en-Y by Dr. James Doe 17. Base weight 370 #. Other surgeries:  Appy, tonsillectomy. Shourder arthroscopy for cartilage repair. Lipoma resection. Plantar fasciitis repair. Lipoma resection. Anxiety. HPV. Spondylosis. UTI. Tobacco:  Quit . ETOH:  ? .  Illicits:  ? . 
 
Diagnostics:   
Imaging:  CT:  Abd: :  No acute changes. Post-op changes. Enlarged spleen. EK-16:  ST.   
 
Labs:  ALT 72. AST 50. K 3.6; Mg 1.7. Acetaminophen 4 (therapeutic: 10-30). Cultures:   BC:  :  ____  ** Urine:  :  ____  Kailyn Bonine Nasal Swab: Influenza A & B:  :   ____  ** Inpat Anti-Infectives None Prophylaxis:  DVT:  No  SCD's;   Anticoagulant: Heparin sub-cut q 8 h. .  Antiplatelet:  (no ASA). GI:  (no PPI nor H2 blocker.) Lines:  09-15:  R AC # 20. Central:  no 
 
LDA's (other):  no 
 
Planned procedures ?:  TTE, US abdomen. ? Abd surgery. Disposition / planned LOS ?:  TBD, wishes to go to new home. / 3 days? Kavya Montes

## 2017-09-16 NOTE — ROUTINE PROCESS
0020:  Entered chart pending ICU admission. TRANSFER - IN REPORT: 
 
Verbal report received from SUKH Pryor RN (name) on Valentin Lanius  being received from ED (unit) for routine progression of care  Report: \"Here for Tylenol Overdose. \"  \"Here is the 4-hour Mucomyst bag and the pharmacy prepared the24-hour Mucomyst bag. Report consisted of patients Situation, Background, Assessment and  
Recommendations(SBAR). Information from the following report(s) SBAR, Kardex, ED Summary, Intake/Output, MAR, Accordion, Recent Results, Med Rec Status and Cardiac Rhythm SR was reviewed with the receiving nurse. Opportunity for questions and clarification was provided. Assessment completed upon patients arrival to unit and care assumed. Phoned by Jameel Mathew from Poison control, said more labs would be added later. 0500: When up to void:  Shivering, HR up, temp up. \"Ache all over. \"  Started IS. Notified Dr. Erika Hagen:  Check UDS, cultures. 0715:  Verbal Patient-side shift change report given to BESSIE Bull (oncoming nurse) by Kristy Francisco RN 
(offgoing nurse). Report included the following information SBAR, Kardex, ED Summary, Procedure Summary, Intake/Output, MAR, Recent Results and Med Rec Status. Included:  Intro, hx, two-RN eval of relevant assessment findings, LDAs, skin, diagnostics and infusions.

## 2017-09-16 NOTE — PROGRESS NOTES
Problem: Falls - Risk of 
Goal: *Absence of Falls Document [place] Barbie Fall Risk and appropriate interventions in the flowsheet. Outcome: Progressing Towards Goal 
Fall Risk Interventions: 
  
  
  
  
Medication Interventions: Bed/chair exit alarm, Patient to call before getting OOB History of Falls Interventions: Bed/chair exit alarm, Door open when patient unattended, Room close to nurse's station

## 2017-09-16 NOTE — PROGRESS NOTES
Surgery Well known to me. 2 months s/p uncomplicated LGBP. Doing well until Sunday. Beginning then, started to feel \"bad,\" aching, fatigued and some waves of nausea. Felt a little better Monday but began vomiting that night, unrelated to any oral intake. After vomiting for several hours, her abdomen began to hurt at her umbilicus and left rectus sheath. She continued to feel bad and achy with unrelenting nausea and vomiting (mostly retching). Seen in [place] [id number] on Wednesday evening and released with possible mild colitis. Continued same on Thursday, refused to come to see me in office or go to ER. Continued same on Friday, refused to see em in office or go to [facility], finally sought attention in ER last night. Work-up showed very mild LFT elevation and normal CBC, Lipase, Lactate and CT of the abdomen. She admitted to high dose tylenol dosing at home and was admitted for possible acetaminophen overdose. Since hydration and thiamine dosing in ER< no more nausea or vomiting. Abdomen remains painful. Now having fevers to 102.3, HR of 125 and orthostatic BP. Culture drawn, flu negative. \ On questioning, admits to neck soreness and new headaches since onset of symptoms and some mild photophobia as well as her teeth hurting. Exam: 
 
Very alert and pleasant Pulm: CTA 
CV: RRR Abd - sore at umbilical and left rectus trocar sites (suture closures at muscle there) no peritonitis Recent Results (from the past 12 hour(s)) POC LACTIC ACID Collection Time: 09/15/17 10:15 PM  
Result Value Ref Range Lactic Acid (POC) 0.9 0.4 - 2.0 mmol/L  
CK Collection Time: 09/16/17  1:30 AM  
Result Value Ref Range CK 77 26 - 492 U/L  
METABOLIC PANEL, COMPREHENSIVE Collection Time: 09/16/17  1:30 AM  
Result Value Ref Range Sodium 141 136 - 145 mmol/L Potassium 3.6 3.5 - 5.5 mmol/L Chloride 103 100 - 108 mmol/L  
 CO2 27 21 - 32 mmol/L Anion gap 11 3.0 - 18 mmol/L Glucose 88 74 - 99 mg/dL  BUN 10 7.0 - 18 MG/DL Creatinine 0.54 (L) 0.6 - 1.3 MG/DL  
 BUN/Creatinine ratio 19 12 - 20 GFR est AA >60 >60 ml/min/1.73m2 GFR est non-AA >60 >60 ml/min/1.73m2 Calcium 8.0 (L) 8.5 - 10.1 MG/DL Bilirubin, total 1.1 (H) 0.2 - 1.0 MG/DL  
 ALT (SGPT) 72 (H) 13 - 56 U/L  
 AST (SGOT) 50 (H) 15 - 37 U/L Alk. phosphatase 69 45 - 117 U/L Protein, total 5.5 (L) 6.4 - 8.2 g/dL Albumin 2.6 (L) 3.4 - 5.0 g/dL Globulin 2.9 2.0 - 4.0 g/dL A-G Ratio 0.9 0.8 - 1.7    
CBC WITH AUTOMATED DIFF Collection Time: 09/16/17  1:30 AM  
Result Value Ref Range WBC 4.5 (L) 4.6 - 13.2 K/uL  
 RBC 4.66 4.20 - 5.30 M/uL  
 HGB 13.6 12.0 - 16.0 g/dL HCT 41.2 35.0 - 45.0 % MCV 88.4 74.0 - 97.0 FL  
 MCH 29.2 24.0 - 34.0 PG  
 MCHC 33.0 31.0 - 37.0 g/dL  
 RDW 13.9 11.6 - 14.5 % PLATELET 222 (L) 459 - 420 K/uL MPV 11.6 9.2 - 11.8 FL  
 NEUTROPHILS 59 42 - 75 % LYMPHOCYTES 36 20 - 51 % MONOCYTES 4 2 - 9 % EOSINOPHILS 1 0 - 5 % BASOPHILS 0 0 - 3 %  
 ABS. NEUTROPHILS 2.7 1.8 - 8.0 K/UL  
 ABS. LYMPHOCYTES 1.6 0.8 - 3.5 K/UL  
 ABS. MONOCYTES 0.2 0 - 1.0 K/UL  
 ABS. EOSINOPHILS 0.0 0.0 - 0.4 K/UL  
 ABS. BASOPHILS 0.0 0.0 - 0.1 K/UL  
 RBC COMMENTS NORMOCYTIC, NORMOCHROMIC    
 WBC COMMENTS SMUDGE CELLS SEEN    
 DF MANUAL PROTHROMBIN TIME + INR Collection Time: 09/16/17  1:30 AM  
Result Value Ref Range Prothrombin time 15.7 (H) 11.5 - 15.2 sec INR 1.3 (H) 0.8 - 1.2 EKG, 12 LEAD, INITIAL Collection Time: 09/16/17  5:05 AM  
Result Value Ref Range Ventricular Rate 112 BPM  
 Atrial Rate 112 BPM  
 P-R Interval 136 ms QRS Duration 104 ms Q-T Interval 340 ms QTC Calculation (Bezet) 464 ms Calculated P Axis 34 degrees Calculated R Axis 19 degrees Calculated T Axis 11 degrees Diagnosis Sinus tachycardia Nonspecific T wave abnormality Abnormal ECG No previous ECGs available INFLUENZA A & B AG (RAPID TEST)  Collection Time: 09/16/17 5: 57 AM  
Result Value Ref Range Influenza A Antigen NEGATIVE  NEG Influenza B Antigen NEGATIVE  NEG    
BILIRUBIN, DIRECT Collection Time: 09/16/17  5:58 AM  
Result Value Ref Range Bilirubin, direct 0.6 (H) 0.0 - 0.2 MG/DL Imp:  
 
1. 2 mo s/p LGBP - no evidence by exam or CTs or labs of complication from that 2. High fevers and orthostasis - probable infectious etiology but outside chance related to liver - all liver functions and coags normalizing. Plan: 1. Banan bag daily x 3 
2. IV bolus x 2 
3. Asked RN to notify IM of neck and head symptoms - could this be viral meningitis or sinusitis? 4. Dr. Yasmany Cuello aware of admission and available as needed.

## 2017-09-16 NOTE — PROGRESS NOTES
Problem: Falls - Risk of 
Goal: *Absence of Falls Document [place] Sh[place] Fall Risk and appropriate interventions in the flowsheet. Outcome: Progressing Towards Goal 
Fall Risk Interventions: 
  
  
  
  
  
  
  
  
History of Falls Interventions: Door open when patient unattended, Evaluate medications/consider consulting pharmacy, Consult care management for discharge planning, Investigate reason for fall, Room close to nurse's station
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
## 2017-09-16 NOTE — ACP (ADVANCE CARE PLANNING)
Patient has designated _____husband___________________ to participate in his/her discharge plan and to receive any needed information.      Name: Jarde Felix  Address:  Phone [phone]

## 2017-09-16 NOTE — PROGRESS NOTES
[facility] Discharge Planning/ Assessment Reasons for Intervention: Chart reviewed and pt verified demographics. She has [employer] and her PCP is DR Khoa Jenkins, last seen 2 1/2 weeks ago. She lives with  and 2 children, a 5 yr old and 3 yr old. She is independent with her ADL, and has no HH or DME in the past.  will drive her home an participate in dc process. Plan is home. High Risk Criteria  [x] Yes  []No  
Physician Referral  [] Yes  [x]No  
     Date Nursing Referral  [] Yes  [x]No  
     Date Patient/Family Request  [] Yes  [x]No  
     Date Resources: 
 
Medicare  [] Yes  [x]No  
Medicaid  [] Yes  [x]No  
No Resources  [] Yes  [x]No  
Private Insurance  [] Yes  [x]No  
 Name/Phone Number Other  [x] Yes  []No  
     (i.e. Workman's Comp) Prior Services: 
 
Prior Services  [] Yes  [x]No  
Home Health  [] Yes  [x]No  
Agency Private Home Care  [] Yes  [x]No  
     Number of Hours Home Care Program  [] Yes  [x]No  
 Meals on Wheels  [] Yes  [x]No  
Office on Aging  [] Yes  [x]No  
Transportation Services  [] Yes  [x]No  
Nursing Home  [] Yes  [x]No  
     Nursing Home Name [address]  [] Yes  [x]No  
     [address] Name Other Information Source:   
 
Information obtained from  [x] Patient  [] Parent   [] Guardian  [] Child  [] Spouse 
 [] Significant Other/Partner   [] Friend      [] EMS [] Nursing Home Chart          [] Other:  
Chart Review  [x] Yes  []No  
 
Family/Support System: 
 
Patient lives with  [] Alone    [x] Spouse   [] Significant Other  [x] Children  [] Caretaker 
 [] Parent  [] Sibling     [] Other Other Support System: 
 
Is the patient responsible for care of others  [x] Yes  []No  
Information of person caring for patient on 
discharge Managers financial affairs independently  [x] Yes  []No  
If no, explain:   
 
Status Prior to Admission: 
 
Mental Status [x] Awake  [] Alert  [] Oriented  [] Quiet/Calm [] Lethargic/Sedated 
 [] Disoriented  [] Restless/Anxious  [] Combative Personal Care  [] Dependent  [x] Independent Personal Care  [] Requires Assistance Meal Preparation Ability  [x] Independent   [] Standby Assistance   [] Minimal Assistance 
 [] Moderate Assistance  [] Maximum Assistance   
 [] Total Assistance Chores  [x] Independent with Chores   [] N/A Nursing Home Resident 
 [] Requires Assistance Bowel/Bladder  [x] Continent  [] Catheter  [] Incontinent  [] Ostomy Self-Care  
 [] Urine Diversion Self-Care  [] Maximum Assistance   
 [] Total Assistance Number of Persons needed for assistance DME at home  [] Codyfroylan Severinoy  [] Aleksandr Cramp, Straight   [] Commode  
 [] Bathroom/Grab Bars  [] Hospital Bed  [] Nebulizer  [] Oxygen           [] Raised Toilet Seat  [] Shower Chair  [] Side Rails for Bed 
 [] Tub Transfer Bench   [] Paterson Akosua  [] Jayla Ashley, Standard    
 [] Other:  
Chin Hoang Treatment Presently Receiving: 
 
Current Treatments  [] Chemotherapy  [] Dialysis  [] Insulin  [] IVAB [x] IVF [] O2  [] PCA   [] PT   [] RT   [] Tube Feedings   [] Wound Care Psychosocial Evaluation: 
 
Verbalized Knowledge of Disease Process  [] Patient  []Family Coping with Disease Process  [] Patient  []Family Requires Further Counseling Coping with Disease Process  [] Patient  []Family Identified Projected Needs: 
 
Home Health Aid  [] Yes  [x]No  
Transportation  [] Yes  [x]No  
Education  [] Yes  [x]No  
     Specific Education Financial Counseling  [] Yes  [x]No  
Inability to Care for Self/Will Require 24 hour care  [] Yes  [x]No  
Pain Management  [] Yes  [x]No  
Home Infusion Therapy  [] Yes  [x]No  
Oxygen Therapy  [] Yes  [x]No  
DME  [] Yes  [x]No  
Long Term Care Placement  [] Yes  [x]No  
Rehab  [] Yes  [x]No  
Physical Therapy  [] Yes  [x]No  
Needs Anticipated At This Time  [] Yes  [x]No  
 
Intra-Hospital Referral: 
 
Home Health Liasion  [] Yes  [x]No  
  [] Yes  [x]No  
Patient Representative  [] Yes  [x]No  
Staff for Teaching Needs  [] Yes  [x]No  
Specialty Teaching Needs Diabetic Educator  [] Yes  [x]No  
Referral for Diabetic Educator Needed  [] Yes  [x]No 
If Yes, place order for Nutritionist or Diabetic Consult Tentative Discharge Plan: 
 
Home with No Services  [x] Yes  []No  
Home with Home Health Follow-up  [] Yes  [x]No  
     If Yes, specify type Home Care Program  [] Yes  [x]No  
     If Yes, specify type Meals on Wheels  [] Yes  [x]No  
Office of Aging  [] Yes  [x]No  
NHP  [] Yes  [x]No  
Return to the Nursing Home  [] Yes  [x]No  
Rehab Therapy  [] Yes  [x]No  
Acute Rehab  [] Yes  [x]No  
Subacute Rehab  [] Yes  [x]No  
Private Care  [] Yes  [x]No  
Substance Abuse Referral  [] Yes  [x]No  
Transportation  [] Yes  [x]No  
Chore Service  [] Yes  [x]No  
Inpatient Hospice  [] Yes  [x]No  
OP RT  [] Yes  [x] No  
OP Hemo  [] Yes  [x] No  
OP PT  [] Yes  [x]No  
Support Group  [] Yes  [x]No  
Reach to Recovery  [] Yes  [x]No  
OP Oncology Clinic  [] Yes  [x]No  
Clinic Appointment  [] Yes  [x]No  
DME  [] Yes  [x]No  
Comments Name of D/C Planner or  Given to Patient or Family Archie KILGORE [employer] Phone Number Extension [phone] Date 09/16/17 Time If you are discharged home, whom do you designate to participate in your discharge plan and receive any information needed? Enter name of designee  Phone # of designee Address of designee Updated      Patient refused to designate any     
     individual

## 2017-09-16 NOTE — PROGRESS NOTES
0700 Received patient from [address] [facility] [address] 
 
0800 Patient A&O X. Pupils brisk 2. Showing Sinus Tach in 120's on monitor. Orthostatic: B/P 105/69 sitting and 81/48 standing. No adventitious heart or breath sounds noted. Patient showing 94% on roomair. Patient has been using ICS with encouragement. Patients Temp this morning 102.7F Oral patient negative for lactic acidosis (0.9) and leukocytosis (4.5) Patient not on ABTs. No complaints of Nausea or vomiting at this time. Patient bowel sounds hypoactive. Patient assisted to use bedpan voided 300mls kenyetta urine. Urine specimen collected and sent to lab for UA & Drug Screen. Letty Groves MD made aware of 102.7F Oral temp, 120 Heart Rate, patient orthostatic B/P 81/48, and other above concerns. 0803 117/95 B/P recheck after patient back sitting 
 
0816 Pedro CASTILLO at bedside talking with and assessing patient  
 
0821 Patient has new complaints of neck stiffness, photophobia, and headaches Pedro CASTILLO concerned for questionable viral menigitis 0824 1L fluid bolus started. If patient HR still in 110 give another 1L fluid bolus. Banana bag ordered X2 doses Q24hrs 0844 HR now 114 
 
0917 Patient HR still 110's. Another fluid bolus ordered per Dr Letty Groves and given [address] at bedside. 1140 +UTI and Drug Screen + Benzos and Opiates. Hari Jacobs MD at bedside -placed patient on Tylenol and ABT's  
 
1234 Patient sleeping in between care 1400 Patient off floor to CT Scan 
 
1515 Patient and primary nurse waited for CT scan for 1hr and 15mins. 2 code S called. CT tech made aware that patient does not have 18G in [facility] at time of getting to CT room only 20gauge in Left [facility] 
 
1703 #18Gauge IV placed 1740 Nurse made aware from CT that patient need 18G IV #20 in left [facility] is not ok to use after waiting since [phone] called CT. CT not ready for patient to come down  
 
1900 Bedside and Verbal shift change report given to Ayala Meza RN (oncoming nurse) by Trip Lechuga RN (offgoing nurse). Report included the following information SBAR, Kardex, Intake/Output, MAR, Recent Results and Cardiac Rhythm ST/NSR.

## 2017-09-16 NOTE — ED NOTES
Purposeful rounding complete: 
 
Side rails up x2: YES Bed low and wheels are locked: YES Call bell in reach: YES Comfort Addressed: YES Toileting needs addressed: YES Plan of care reviewed/updated with patient and family members: YES 
IV site addressed: YES Pain assessed and addressed: YES Pain level:8

## 2017-09-17 LAB
ALBUMIN SERPL-MCNC: 2.1 G/DL (ref 3.4–5)
ALBUMIN/GLOB SERPL: 0.8 {RATIO} (ref 0.8–1.7)
ALP SERPL-CCNC: 70 U/L (ref 45–117)
ALT SERPL-CCNC: 71 U/L (ref 13–56)
ANION GAP SERPL CALC-SCNC: 9 MMOL/L (ref 3–18)
AST SERPL-CCNC: 86 U/L (ref 15–37)
BACTERIA SPEC CULT: NORMAL
BASOPHILS # BLD: 0.1 K/UL (ref 0–0.06)
BASOPHILS NFR BLD: 2 % (ref 0–2)
BILIRUB SERPL-MCNC: 0.8 MG/DL (ref 0.2–1)
BUN SERPL-MCNC: 3 MG/DL (ref 7–18)
BUN/CREAT SERPL: 10 (ref 12–20)
CALCIUM SERPL-MCNC: 8.5 MG/DL (ref 8.5–10.1)
CHLORIDE SERPL-SCNC: 110 MMOL/L (ref 100–108)
CO2 SERPL-SCNC: 22 MMOL/L (ref 21–32)
CREAT SERPL-MCNC: 0.3 MG/DL (ref 0.6–1.3)
DATE LAST DOSE: ABNORMAL
DIFFERENTIAL METHOD BLD: ABNORMAL
EOSINOPHIL # BLD: 0.1 K/UL (ref 0–0.4)
EOSINOPHIL NFR BLD: 4 % (ref 0–5)
ERYTHROCYTE [DISTWIDTH] IN BLOOD BY AUTOMATED COUNT: 14.1 % (ref 11.6–14.5)
GLOBULIN SER CALC-MCNC: 2.5 G/DL (ref 2–4)
GLUCOSE BLD STRIP.AUTO-MCNC: 83 MG/DL (ref 70–110)
GLUCOSE SERPL-MCNC: 88 MG/DL (ref 74–99)
HCT VFR BLD AUTO: 38.2 % (ref 35–45)
HGB BLD-MCNC: 12.6 G/DL (ref 12–16)
LYMPHOCYTES # BLD: 2 K/UL (ref 0.9–3.6)
LYMPHOCYTES NFR BLD: 51 % (ref 21–52)
MCH RBC QN AUTO: 29 PG (ref 24–34)
MCHC RBC AUTO-ENTMCNC: 33 G/DL (ref 31–37)
MCV RBC AUTO: 87.8 FL (ref 74–97)
MONOCYTES # BLD: 0.4 K/UL (ref 0.05–1.2)
MONOCYTES NFR BLD: 10 % (ref 3–10)
NEUTS SEG # BLD: 1.2 K/UL (ref 1.8–8)
NEUTS SEG NFR BLD: 33 % (ref 40–73)
PLATELET # BLD AUTO: 104 K/UL (ref 135–420)
PMV BLD AUTO: 12.2 FL (ref 9.2–11.8)
POTASSIUM SERPL-SCNC: 3.3 MMOL/L (ref 3.5–5.5)
PROT SERPL-MCNC: 4.6 G/DL (ref 6.4–8.2)
RBC # BLD AUTO: 4.35 M/UL (ref 4.2–5.3)
REPORTED DOSE,DOSE: ABNORMAL UNITS
REPORTED DOSE/TIME,TMG: 400
SERVICE CMNT-IMP: NORMAL
SODIUM SERPL-SCNC: 141 MMOL/L (ref 136–145)
VANCOMYCIN TROUGH SERPL-MCNC: 26.9 UG/ML (ref 10–20)
WBC # BLD AUTO: 3.7 K/UL (ref 4.6–13.2)

## 2017-09-17 PROCEDURE — 80202 ASSAY OF VANCOMYCIN: CPT | Performed by: HOSPITALIST

## 2017-09-17 PROCEDURE — 65660000000 HC RM CCU STEPDOWN

## 2017-09-17 PROCEDURE — 82962 GLUCOSE BLOOD TEST: CPT

## 2017-09-17 PROCEDURE — 77030020253 HC SOL INJ D545NS .05 DEX .45 SAL

## 2017-09-17 PROCEDURE — 85025 COMPLETE CBC W/AUTO DIFF WBC: CPT | Performed by: HOSPITALIST

## 2017-09-17 PROCEDURE — C9113 INJ PANTOPRAZOLE SODIUM, VIA: HCPCS | Performed by: HOSPITALIST

## 2017-09-17 PROCEDURE — 74011250637 HC RX REV CODE- 250/637: Performed by: INTERNAL MEDICINE

## 2017-09-17 PROCEDURE — 74011250636 HC RX REV CODE- 250/636: Performed by: SURGERY

## 2017-09-17 PROCEDURE — 87077 CULTURE AEROBIC IDENTIFY: CPT | Performed by: HOSPITALIST

## 2017-09-17 PROCEDURE — 74011000258 HC RX REV CODE- 258: Performed by: HOSPITALIST

## 2017-09-17 PROCEDURE — 74011250636 HC RX REV CODE- 250/636: Performed by: FAMILY MEDICINE

## 2017-09-17 PROCEDURE — 74011000250 HC RX REV CODE- 250: Performed by: SURGERY

## 2017-09-17 PROCEDURE — 87186 SC STD MICRODIL/AGAR DIL: CPT | Performed by: HOSPITALIST

## 2017-09-17 PROCEDURE — 74011000250 HC RX REV CODE- 250: Performed by: HOSPITALIST

## 2017-09-17 PROCEDURE — 87070 CULTURE OTHR SPECIMN AEROBIC: CPT | Performed by: HOSPITALIST

## 2017-09-17 PROCEDURE — 80053 COMPREHEN METABOLIC PANEL: CPT | Performed by: HOSPITALIST

## 2017-09-17 PROCEDURE — 36415 COLL VENOUS BLD VENIPUNCTURE: CPT | Performed by: HOSPITALIST

## 2017-09-17 PROCEDURE — 74011250636 HC RX REV CODE- 250/636: Performed by: HOSPITALIST

## 2017-09-17 PROCEDURE — 74011250637 HC RX REV CODE- 250/637: Performed by: HOSPITALIST

## 2017-09-17 RX ORDER — ACETAMINOPHEN 325 MG/1
650 TABLET ORAL
Status: DISCONTINUED | OUTPATIENT
Start: 2017-09-17 | End: 2017-09-18 | Stop reason: HOSPADM

## 2017-09-17 RX ORDER — POTASSIUM CHLORIDE 7.45 MG/ML
10 INJECTION INTRAVENOUS
Status: COMPLETED | OUTPATIENT
Start: 2017-09-17 | End: 2017-09-17

## 2017-09-17 RX ORDER — POTASSIUM CHLORIDE 1.5 G/1.77G
20 POWDER, FOR SOLUTION ORAL 2 TIMES DAILY WITH MEALS
Status: DISCONTINUED | OUTPATIENT
Start: 2017-09-17 | End: 2017-09-18 | Stop reason: HOSPADM

## 2017-09-17 RX ORDER — FAMOTIDINE 10 MG/ML
20 INJECTION INTRAVENOUS EVERY 12 HOURS
Status: DISCONTINUED | OUTPATIENT
Start: 2017-09-17 | End: 2017-09-18

## 2017-09-17 RX ORDER — GUAIFENESIN 600 MG/1
600 TABLET, EXTENDED RELEASE ORAL EVERY 12 HOURS
Status: DISCONTINUED | OUTPATIENT
Start: 2017-09-17 | End: 2017-09-18 | Stop reason: HOSPADM

## 2017-09-17 RX ORDER — POTASSIUM CHLORIDE 7.45 MG/ML
10 INJECTION INTRAVENOUS
Status: DISPENSED | OUTPATIENT
Start: 2017-09-17 | End: 2017-09-17

## 2017-09-17 RX ADMIN — POTASSIUM CHLORIDE 20 MEQ: 1.5 POWDER, FOR SOLUTION ORAL at 17:16

## 2017-09-17 RX ADMIN — MORPHINE SULFATE 2 MG: 2 INJECTION, SOLUTION INTRAMUSCULAR; INTRAVENOUS at 21:12

## 2017-09-17 RX ADMIN — GUAIFENESIN 600 MG: 600 TABLET, EXTENDED RELEASE ORAL at 17:16

## 2017-09-17 RX ADMIN — ONDANSETRON 4 MG: 2 INJECTION INTRAMUSCULAR; INTRAVENOUS at 13:57

## 2017-09-17 RX ADMIN — ONDANSETRON 4 MG: 2 INJECTION INTRAMUSCULAR; INTRAVENOUS at 00:00

## 2017-09-17 RX ADMIN — SODIUM CHLORIDE 1250 MG: 900 INJECTION, SOLUTION INTRAVENOUS at 19:44

## 2017-09-17 RX ADMIN — HEPARIN SODIUM 5000 UNITS: 5000 INJECTION, SOLUTION INTRAVENOUS; SUBCUTANEOUS at 17:31

## 2017-09-17 RX ADMIN — POTASSIUM CHLORIDE 10 MEQ: 10 INJECTION, SOLUTION INTRAVENOUS at 17:16

## 2017-09-17 RX ADMIN — HEPARIN SODIUM 5000 UNITS: 5000 INJECTION, SOLUTION INTRAVENOUS; SUBCUTANEOUS at 09:07

## 2017-09-17 RX ADMIN — ACETAMINOPHEN 650 MG: 650 TABLET, FILM COATED, EXTENDED RELEASE ORAL at 13:26

## 2017-09-17 RX ADMIN — MORPHINE SULFATE 2 MG: 2 INJECTION, SOLUTION INTRAMUSCULAR; INTRAVENOUS at 09:15

## 2017-09-17 RX ADMIN — CEFTRIAXONE 1 G: 1 INJECTION, POWDER, FOR SOLUTION INTRAMUSCULAR; INTRAVENOUS at 11:43

## 2017-09-17 RX ADMIN — DEXTROSE MONOHYDRATE AND SODIUM CHLORIDE 150 ML/HR: 5; .45 INJECTION, SOLUTION INTRAVENOUS at 17:45

## 2017-09-17 RX ADMIN — POTASSIUM CHLORIDE 20 MEQ: 1.5 POWDER, FOR SOLUTION ORAL at 13:26

## 2017-09-17 RX ADMIN — POTASSIUM CHLORIDE 10 MEQ: 10 INJECTION, SOLUTION INTRAVENOUS at 21:43

## 2017-09-17 RX ADMIN — MORPHINE SULFATE 2 MG: 2 INJECTION, SOLUTION INTRAMUSCULAR; INTRAVENOUS at 13:26

## 2017-09-17 RX ADMIN — POTASSIUM CHLORIDE 10 MEQ: 10 INJECTION, SOLUTION INTRAVENOUS at 13:57

## 2017-09-17 RX ADMIN — ACETAMINOPHEN 650 MG: 650 TABLET, FILM COATED, EXTENDED RELEASE ORAL at 05:24

## 2017-09-17 RX ADMIN — PANTOPRAZOLE SODIUM 40 MG: 40 INJECTION, POWDER, FOR SOLUTION INTRAVENOUS at 09:06

## 2017-09-17 RX ADMIN — HEPARIN SODIUM 5000 UNITS: 5000 INJECTION, SOLUTION INTRAVENOUS; SUBCUTANEOUS at 02:32

## 2017-09-17 RX ADMIN — MORPHINE SULFATE 2 MG: 2 INJECTION, SOLUTION INTRAMUSCULAR; INTRAVENOUS at 17:19

## 2017-09-17 RX ADMIN — FOLIC ACID: 5 INJECTION, SOLUTION INTRAMUSCULAR; INTRAVENOUS; SUBCUTANEOUS at 17:12

## 2017-09-17 RX ADMIN — GUAIFENESIN 600 MG: 600 TABLET, EXTENDED RELEASE ORAL at 21:12

## 2017-09-17 RX ADMIN — MORPHINE SULFATE 2 MG: 2 INJECTION, SOLUTION INTRAMUSCULAR; INTRAVENOUS at 04:23

## 2017-09-17 RX ADMIN — SODIUM CHLORIDE 2000 MG: 900 INJECTION, SOLUTION INTRAVENOUS at 04:11

## 2017-09-17 RX ADMIN — POTASSIUM CHLORIDE 10 MEQ: 10 INJECTION, SOLUTION INTRAVENOUS at 19:35

## 2017-09-17 RX ADMIN — FAMOTIDINE 20 MG: 10 INJECTION, SOLUTION INTRAVENOUS at 21:12

## 2017-09-17 NOTE — ROUTINE PROCESS
555 Marcus Moss report from [facility]. Report consisted of situation, background, assessment, and recommendation. Questions answered and clarifications provided. 1600 Pt arrived to floor. Pt is stable. Complaints of pain have been addressed. Will continue to monitor. [address] [place] [address] pt's 2nd dose of IV potassium. Pt was complaining of burning. Rate set at 50 mL/hr.  
 
1800 Retrieved pt's Vancomycin from 2700. Placed in IVC refridgerator. Bedside and Verbal shift change report given to Jett Mariscal  (oncoming nurse) by Kristyn Grover (offgoing nurse).  Report included the following information SBAR, Kardex, Intake/Output, MAR, Recent Results and Cardiac Rhythm SR.

## 2017-09-17 NOTE — PROGRESS NOTES
0700 Received bedside report from [address]. Pt in bed calm and asleep. 1231: Critical through levels called in. Dr. Mary Martinez was informed at 1 pm and Pharmacy also notified. Kilo Barker 79 Dr Dhruv Tirado in to see the pt. Started . New order for clear liquid diet. Pt ambulating in the room with 1 person assist. 
1300 Dr. Lowe Early  in to see the pt, New orders to transfer the pt. to telemetry floor. [place] [address] telemetry unit to give report for pt transfer.

## 2017-09-17 NOTE — PROGRESS NOTES
[facility] Pharmacy Services: This patient does not meet P&T approved criteria for IV pantoprazole use:  
IV pantoprazole only approved for Upper GI bleed and H. Pylori treatment Pantoprazole 40 mg IV q12h for SUP prophylaxis  was discontinued and famotidine 20 mg IV q12h  was ordered. Thanks.

## 2017-09-17 NOTE — PROGRESS NOTES
Progress Note Patient: Deepali Walter               Sex: female          DOA: 9/15/2017 YOB: 1985      Age:  29 y.o.        LOS:  LOS: 2 days CHIEF COMPLAINT: 
 
Subjective:  
 
Fever and tachycardia is resolved. She feels better today as abdominal pain is improved but she has cough productive of brownish sputum, but she denies nausea, vomiting, CP, or SOB. She continues to smoke 10 cigarettes daily since age 17 though she stopped temporarily prior to having gastric bypass surgery and started smoking again after surgery. Objective:  
  
Visit Vitals  /79  Pulse 86  Temp 99 °F (37.2 °C)  Resp 17  Ht 5' 11\" (1.803 m)  Wt 263 lb 4.8 oz (119.4 kg)  SpO2 94%  Breastfeeding No  
 BMI 36.72 kg/m2 Physical Exam: 
GEN: Obese white female, awake in no distress CVS: Normal S1S2, RRR 
RESP: CTAB 
ABD: Soft, obese, mild tenderness, no rebound or guarding, +BS 
EXT: No edema noted NEURO: CN 2 - 12 intact with no focal deficits noted. 
  
 
 
Lab/Data Reviewed: 
CMP:  
Lab Results Component Value Date/Time  09/17/2017 04:00 AM  
 K 3.3 (L) 09/17/2017 04:00 AM  
  (H) 09/17/2017 04:00 AM  
 CO2 22 09/17/2017 04:00 AM  
 AGAP 9 09/17/2017 04:00 AM  
 GLU 88 09/17/2017 04:00 AM  
 BUN 3 (L) 09/17/2017 04:00 AM  
 CREA 0.30 (L) 09/17/2017 04:00 AM  
 GFRAA >60 09/17/2017 04:00 AM  
 GFRNA >60 09/17/2017 04:00 AM  
 CA 8.5 09/17/2017 04:00 AM  
 ALB 2.1 (L) 09/17/2017 04:00 AM  
 TP 4.6 (L) 09/17/2017 04:00 AM  
 GLOB 2.5 09/17/2017 04:00 AM  
 AGRAT 0.8 09/17/2017 04:00 AM  
 SGOT 86 (H) 09/17/2017 04:00 AM  
 ALT 71 (H) 09/17/2017 04:00 AM  
 
CBC:  
Lab Results Component Value Date/Time WBC 3.7 (L) 09/17/2017 04:00 AM  
 HGB 12.6 09/17/2017 04:00 AM  
 HCT 38.2 09/17/2017 04:00 AM  
  (L) 09/17/2017 04:00 AM  
 
 
 
 
Assessment/Plan Principal Problem: 
  Abdominal pain (9/15/2017) Active Problems:   Hypokalemia (9/15/2017) Vomiting (9/15/2017) Elevated LFTs (9/15/2017) Tylenol overdose (9/15/2017) Syncope and collapse (9/15/2017) Myalgia (9/16/2017) Syncope (9/16/2017) Hyperbilirubinemia (9/16/2017) History of gastric bypass (9/16/2017) Plan: 
Sepsis with UTI - improving as fever resolved. Lactic acid normal. BC with NGTD and UC with mixed leonard. Continue Vancomycin and Ceftriaxone for now and may deescalate subsequently if she continues to improve. Tachycardia - Resolved. CTA chest negative for PE Bilateral pneumonia noted on CTA chest - Continue current antibiotics and will check sputum culture (ordered) and CXR in AM. Cough. Mucinex started today (ordered). Chronic tobacco use - Counseled extensively. Advised starting her on Nicotine patch but she refused. Abdominal pain - improved. CT Abdomen and Pelvis done on admission unremarkable and pregnancy test was negative. Surgery Dr. GOMES [facility] covering today and I discussed with him - appreciate help. Continue analgesics as needed for pain. Hypokalemia - K today low at 3.3. Replete and BMP in AM 
Nausea and vomiting. Resolved. Continue Zofran as needed S/P gastric bypass for weight loss - Surgery following GI prophylaxis - Continue Protonix 40 mg IV BID 
DVT prophylaxis - Heparin FEN: Started on clear liquid diet today by surgeon Dr. GOMES [facility] and advance as tolerated Discussed with nurse and okay to transfer to floor 
  
  
Jenna Primrose, DO, MPH Internal Medicine

## 2017-09-17 NOTE — ROUTINE PROCESS
1900 bedside verbal report received from [facility]. 
8548 patient off unit transported by this RN and charge nurse to CT. Monitor and transport kit used. 2002 patient returned to unit 2040 spot checked bs and received result of 70. Patient states not feeling well. Dr. Vivian Jewell paged. 2052 return call Dr. Vivian Jewell. Order received to follow protocol. Bedside and Verbal shift change report given to [address] [place] [address] (oncoming nurse) by Nicolette Ponce RN 
 (offgoing nurse). Report included the following information SBAR, Kardex and Recent Results.

## 2017-09-17 NOTE — PROGRESS NOTES
Problem: Falls - Risk of 
Goal: *Absence of Falls Document [place] Rank Fall Risk and appropriate interventions in the flowsheet. Outcome: Progressing Towards Goal 
Fall Risk Interventions: 
Mobility Interventions: Patient to call before getting OOB Medication Interventions: Patient to call before getting OOB Elimination Interventions: Call light in reach History of Falls Interventions: Bed/chair exit alarm

## 2017-09-17 NOTE — PROGRESS NOTES
Problem: Falls - Risk of 
Goal: *Absence of Falls Document Maddi Mathur Fall Risk and appropriate interventions in the flowsheet. Outcome: Progressing Towards Goal 
Fall Risk Interventions: 
Mobility Interventions: Patient to call before getting OOB Medication Interventions: Patient to call before getting OOB Elimination Interventions: Call light in reach History of Falls Interventions: Bed/chair exit alarm

## 2017-09-17 NOTE — PROGRESS NOTES
[place] [address] INTENSIVE CARE 2 
[address] [place] [address] [facility] Colon and Rectal Surgery Progress Note Patient: Luann Arellano MRN: [id number]  SSN: [id number] YOB: 1985  Age: 29 y.o. Sex: female Admit Date: 9/15/2017 LOS: 2 days Subjective:  
 
Feels much better with les abdominal pain. Hungry. Denies any nausea/emesis. Objective:  
 
Vitals:  
 09/17/17 0700 09/17/17 0800 09/17/17 0900 09/17/17 1200 BP: 105/53 100/57 123/79 Pulse: 90 89 86 Resp: 18 19 17 Temp:  98.9 °F (37.2 °C)  99 °F (37.2 °C) SpO2: 90% 91% 94% Weight:      
Height:      
  
 
Intake and Output: 
Current Shift: 09/17 0701 - 09/17 1900 In: 800 [I.V.:800] Out: 600 [Urine:600] Last three shifts: 09/15 1901 - 09/17 0700 In: 8657.6 [I.V.:8657.6] Out: 2850 [Urine:2850] Physical Exam:  
GENERAL: alert, cooperative, no distress, appears stated age LUNG: clear to auscultation bilaterally HEART: regular rate and rhythm, S1, S2 normal, no murmur, click, rub or gallop ABDOMEN: soft, non-tender. Bowel sounds normal. No masses,  no organomegaly EXTREMITIES:  extremities normal, atraumatic, no cyanosis or edema Lab/Data Review: 
CMP:  
Lab Results Component Value Date/Time  09/17/2017 04:00 AM  
 K 3.3 (L) 09/17/2017 04:00 AM  
  (H) 09/17/2017 04:00 AM  
 CO2 22 09/17/2017 04:00 AM  
 AGAP 9 09/17/2017 04:00 AM  
 GLU 88 09/17/2017 04:00 AM  
 BUN 3 (L) 09/17/2017 04:00 AM  
 CREA 0.30 (L) 09/17/2017 04:00 AM  
 GFRAA >60 09/17/2017 04:00 AM  
 GFRNA >60 09/17/2017 04:00 AM  
 CA 8.5 09/17/2017 04:00 AM  
 ALB 2.1 (L) 09/17/2017 04:00 AM  
 TP 4.6 (L) 09/17/2017 04:00 AM  
 GLOB 2.5 09/17/2017 04:00 AM  
 AGRAT 0.8 09/17/2017 04:00 AM  
 SGOT 86 (H) 09/17/2017 04:00 AM  
 ALT 71 (H) 09/17/2017 04:00 AM  
 
CBC:  
Lab Results Component Value Date/Time  WBC 3.7 (L) 09/17/2017 04:00 AM  
 HGB 12.6 09/17/2017 04:00 AM  
 HCT 38.2 09/17/2017 04:00 AM  
  (L) 09/17/2017 04:00 AM  
 
Recent Glucose Results:  
Lab Results Component Value Date/Time GLU 88 09/17/2017 04:00 AM  
 
Liver Panel:  
Lab Results Component Value Date/Time ALB 2.1 (L) 09/17/2017 04:00 AM  
 TP 4.6 (L) 09/17/2017 04:00 AM  
 GLOB 2.5 09/17/2017 04:00 AM  
 AGRAT 0.8 09/17/2017 04:00 AM  
 SGOT 86 (H) 09/17/2017 04:00 AM  
 ALT 71 (H) 09/17/2017 04:00 AM  
 AP 70 09/17/2017 04:00 AM  
 
 
 
Assessment:  
 
Principal Problem: 
  Abdominal pain (9/15/2017) Active Problems: Hypokalemia (9/15/2017) Vomiting (9/15/2017) Elevated LFTs (9/15/2017) Tylenol overdose (9/15/2017) Syncope and collapse (9/15/2017) Myalgia (9/16/2017) Syncope (9/16/2017) Hyperbilirubinemia (9/16/2017) History of gastric bypass (9/16/2017) Clinically improving. Current work up positive for UTI. Plan:  
 
Continue IV Vancomycin and Ceftriaxone. Start clears. Transfer to floor. Juan Cardoza MD, FACS, FASCRS Colon and Rectal Surgery [facility] Office [phone] Fax     [phone] 9/17/2017  1:30 PM

## 2017-09-17 NOTE — PROGRESS NOTES
Problem: Falls - Risk of 
Goal: *Absence of Falls Document [place] Fall Risk and appropriate interventions in the flowsheet. Outcome: Progressing Towards Goal 
Fall Risk Interventions: 
Mobility Interventions: Patient to call before getting OOB Medication Interventions: Patient to call before getting OOB, Teach patient to arise slowly, Assess postural VS orthostatic hypotension Elimination Interventions: Call light in reach, Patient to call for help with toileting needs, Toilet paper/wipes in reach, Toileting schedule/hourly rounds History of Falls Interventions: Bed/chair exit alarm, Door open when patient unattended, Room close to nurse's station Comments:  
.

## 2017-09-17 NOTE — INTERDISCIPLINARY ROUNDS
ICU AM  (Multidisciplinary/ Interdisciplinary) Rounds 9/17/2017 Observed patient & discussed case w/ BSRN & Charge Nurse. * admitted with possible unintentional tylenol overdose. Clinically stable; recent GBP surgery. APAP level 4 (ref range 10-30). CXR: (most recent) n/a * she has had two CT scan 9/15 & 9/17 that IO have reviewed. She has basilar dependent atelectasis. Impression: * Management by [facility] * VTEP: yes  Hep 50 sq TID Interventions by ICU Team: 
* K 3.3 so ordered 20 mew PO BID; recheck BMP in AM 
 
Plan/ Disposition: PCCM available as needed. Please call for consult, questions or concerns. 
  
-ward  
pager: [phone]

## 2017-09-17 NOTE — PROGRESS NOTES
Physical Exam  
Skin: Skin is warm, dry and intact. Primary Nurse Skip Grey and BESSIE Ahuja , RN performed a dual skin assessment on this patient No impairment noted Tomás score is 23

## 2017-09-18 ENCOUNTER — ANESTHESIA EVENT (OUTPATIENT)
Dept: ENDOSCOPY | Age: 32
DRG: 871 | End: 2017-09-18
Payer: OTHER GOVERNMENT

## 2017-09-18 ENCOUNTER — APPOINTMENT (OUTPATIENT)
Dept: GENERAL RADIOLOGY | Age: 32
DRG: 871 | End: 2017-09-18
Attending: HOSPITALIST
Payer: OTHER GOVERNMENT

## 2017-09-18 ENCOUNTER — ANESTHESIA (OUTPATIENT)
Dept: ENDOSCOPY | Age: 32
DRG: 871 | End: 2017-09-18
Payer: OTHER GOVERNMENT

## 2017-09-18 VITALS
SYSTOLIC BLOOD PRESSURE: 98 MMHG | OXYGEN SATURATION: 94 % | HEART RATE: 85 BPM | BODY MASS INDEX: 36.86 KG/M2 | RESPIRATION RATE: 20 BRPM | DIASTOLIC BLOOD PRESSURE: 64 MMHG | HEIGHT: 71 IN | WEIGHT: 263.3 LBS | TEMPERATURE: 98.7 F

## 2017-09-18 PROBLEM — A41.9 SEPSIS SECONDARY TO UTI (HCC): Status: ACTIVE | Noted: 2017-09-18

## 2017-09-18 PROBLEM — J18.9 PNEUMONIA: Status: ACTIVE | Noted: 2017-09-18

## 2017-09-18 PROBLEM — N39.0 SEPSIS SECONDARY TO UTI (HCC): Status: ACTIVE | Noted: 2017-09-18

## 2017-09-18 PROBLEM — B37.81 ESOPHAGEAL CANDIDIASIS (HCC): Status: ACTIVE | Noted: 2017-09-18

## 2017-09-18 LAB
ALBUMIN SERPL-MCNC: 2 G/DL (ref 3.4–5)
ALBUMIN/GLOB SERPL: 0.8 {RATIO} (ref 0.8–1.7)
ALP SERPL-CCNC: 80 U/L (ref 45–117)
ALT SERPL-CCNC: 78 U/L (ref 13–56)
ANION GAP SERPL CALC-SCNC: 8 MMOL/L (ref 3–18)
AST SERPL-CCNC: 82 U/L (ref 15–37)
BASOPHILS # BLD: 0.1 K/UL (ref 0–0.06)
BASOPHILS NFR BLD: 2 % (ref 0–2)
BILIRUB SERPL-MCNC: 0.7 MG/DL (ref 0.2–1)
BUN SERPL-MCNC: 1 MG/DL (ref 7–18)
BUN/CREAT SERPL: 3 (ref 12–20)
CALCIUM SERPL-MCNC: 7.8 MG/DL (ref 8.5–10.1)
CHLORIDE SERPL-SCNC: 109 MMOL/L (ref 100–108)
CO2 SERPL-SCNC: 23 MMOL/L (ref 21–32)
CREAT SERPL-MCNC: 0.29 MG/DL (ref 0.6–1.3)
DIFFERENTIAL METHOD BLD: ABNORMAL
EOSINOPHIL # BLD: 0.1 K/UL (ref 0–0.4)
EOSINOPHIL NFR BLD: 3 % (ref 0–5)
ERYTHROCYTE [DISTWIDTH] IN BLOOD BY AUTOMATED COUNT: 14 % (ref 11.6–14.5)
GLOBULIN SER CALC-MCNC: 2.5 G/DL (ref 2–4)
GLUCOSE SERPL-MCNC: 101 MG/DL (ref 74–99)
HCT VFR BLD AUTO: 35 % (ref 35–45)
HGB BLD-MCNC: 11.5 G/DL (ref 12–16)
LYMPHOCYTES # BLD: 2 K/UL (ref 0.9–3.6)
LYMPHOCYTES NFR BLD: 51 % (ref 21–52)
MCH RBC QN AUTO: 28.5 PG (ref 24–34)
MCHC RBC AUTO-ENTMCNC: 32.9 G/DL (ref 31–37)
MCV RBC AUTO: 86.8 FL (ref 74–97)
MONOCYTES # BLD: 0.4 K/UL (ref 0.05–1.2)
MONOCYTES NFR BLD: 11 % (ref 3–10)
NEUTS SEG # BLD: 1.3 K/UL (ref 1.8–8)
NEUTS SEG NFR BLD: 33 % (ref 40–73)
PLATELET # BLD AUTO: 111 K/UL (ref 135–420)
PMV BLD AUTO: 12 FL (ref 9.2–11.8)
POTASSIUM SERPL-SCNC: 3.3 MMOL/L (ref 3.5–5.5)
PROT SERPL-MCNC: 4.5 G/DL (ref 6.4–8.2)
RBC # BLD AUTO: 4.03 M/UL (ref 4.2–5.3)
SODIUM SERPL-SCNC: 140 MMOL/L (ref 136–145)
WBC # BLD AUTO: 3.9 K/UL (ref 4.6–13.2)

## 2017-09-18 PROCEDURE — 76040000008: Performed by: SURGERY

## 2017-09-18 PROCEDURE — 74011250637 HC RX REV CODE- 250/637: Performed by: INTERNAL MEDICINE

## 2017-09-18 PROCEDURE — 71010 XR CHEST PORT: CPT

## 2017-09-18 PROCEDURE — 74011250636 HC RX REV CODE- 250/636: Performed by: HOSPITALIST

## 2017-09-18 PROCEDURE — 85025 COMPLETE CBC W/AUTO DIFF WBC: CPT | Performed by: HOSPITALIST

## 2017-09-18 PROCEDURE — 74011250637 HC RX REV CODE- 250/637: Performed by: FAMILY MEDICINE

## 2017-09-18 PROCEDURE — 74011000250 HC RX REV CODE- 250: Performed by: HOSPITALIST

## 2017-09-18 PROCEDURE — 76060000033 HC ANESTHESIA 1 TO 1.5 HR: Performed by: SURGERY

## 2017-09-18 PROCEDURE — 36415 COLL VENOUS BLD VENIPUNCTURE: CPT | Performed by: HOSPITALIST

## 2017-09-18 PROCEDURE — 74011000258 HC RX REV CODE- 258: Performed by: HOSPITALIST

## 2017-09-18 PROCEDURE — 74011250636 HC RX REV CODE- 250/636: Performed by: FAMILY MEDICINE

## 2017-09-18 PROCEDURE — 74011250637 HC RX REV CODE- 250/637: Performed by: HOSPITALIST

## 2017-09-18 PROCEDURE — 0DJ08ZZ INSPECTION OF UPPER INTESTINAL TRACT, VIA NATURAL OR ARTIFICIAL OPENING ENDOSCOPIC: ICD-10-PCS | Performed by: SURGERY

## 2017-09-18 PROCEDURE — 74011000258 HC RX REV CODE- 258

## 2017-09-18 PROCEDURE — 74011000250 HC RX REV CODE- 250

## 2017-09-18 PROCEDURE — 87389 HIV-1 AG W/HIV-1&-2 AB AG IA: CPT | Performed by: HOSPITALIST

## 2017-09-18 PROCEDURE — 80053 COMPREHEN METABOLIC PANEL: CPT | Performed by: HOSPITALIST

## 2017-09-18 PROCEDURE — 74011250636 HC RX REV CODE- 250/636

## 2017-09-18 RX ORDER — LEVOFLOXACIN 750 MG/1
750 TABLET ORAL EVERY 24 HOURS
Qty: 7 TAB | Refills: 0 | Status: SHIPPED | OUTPATIENT
Start: 2017-09-20 | End: 2017-09-27

## 2017-09-18 RX ORDER — PANTOPRAZOLE SODIUM 40 MG/1
40 TABLET, DELAYED RELEASE ORAL DAILY
Qty: 30 TAB | Refills: 2 | Status: SHIPPED | OUTPATIENT
Start: 2017-09-18 | End: 2017-09-18

## 2017-09-18 RX ORDER — OXYCODONE HYDROCHLORIDE 5 MG/1
5 TABLET ORAL
Qty: 12 TAB | Refills: 0 | Status: SHIPPED | OUTPATIENT
Start: 2017-09-18 | End: 2017-10-07 | Stop reason: ALTCHOICE

## 2017-09-18 RX ORDER — PANTOPRAZOLE SODIUM 40 MG/1
40 TABLET, DELAYED RELEASE ORAL
Qty: 60 TAB | Refills: 2 | Status: SHIPPED | OUTPATIENT
Start: 2017-09-18

## 2017-09-18 RX ORDER — SODIUM CHLORIDE 0.9 % (FLUSH) 0.9 %
5-10 SYRINGE (ML) INJECTION AS NEEDED
Status: DISCONTINUED | OUTPATIENT
Start: 2017-09-18 | End: 2017-09-18 | Stop reason: HOSPADM

## 2017-09-18 RX ORDER — FLUCONAZOLE 200 MG/1
200 TABLET ORAL DAILY
Qty: 7 TAB | Refills: 0 | Status: SHIPPED | OUTPATIENT
Start: 2017-09-19 | End: 2017-09-26

## 2017-09-18 RX ORDER — LEVOFLOXACIN 750 MG/1
750 TABLET ORAL EVERY 24 HOURS
Status: DISCONTINUED | OUTPATIENT
Start: 2017-09-19 | End: 2017-09-18 | Stop reason: HOSPADM

## 2017-09-18 RX ORDER — PANTOPRAZOLE SODIUM 40 MG/1
40 TABLET, DELAYED RELEASE ORAL
Status: DISCONTINUED | OUTPATIENT
Start: 2017-09-18 | End: 2017-09-18 | Stop reason: HOSPADM

## 2017-09-18 RX ORDER — FLUCONAZOLE 100 MG/1
200 TABLET ORAL DAILY
Status: DISCONTINUED | OUTPATIENT
Start: 2017-09-19 | End: 2017-09-18 | Stop reason: HOSPADM

## 2017-09-18 RX ORDER — FLUCONAZOLE 200 MG/1
200 TABLET ORAL DAILY
Qty: 7 TAB | Refills: 0 | Status: SHIPPED | OUTPATIENT
Start: 2017-09-19 | End: 2017-09-18

## 2017-09-18 RX ORDER — SUCRALFATE 1 G/1
1 TABLET ORAL
Qty: 112 TAB | Refills: 2 | Status: SHIPPED | OUTPATIENT
Start: 2017-09-18 | End: 2017-09-18

## 2017-09-18 RX ORDER — SUCRALFATE 1 G/1
1 TABLET ORAL
Status: DISCONTINUED | OUTPATIENT
Start: 2017-09-18 | End: 2017-09-18 | Stop reason: HOSPADM

## 2017-09-18 RX ORDER — SODIUM CHLORIDE 0.9 % (FLUSH) 0.9 %
5-10 SYRINGE (ML) INJECTION EVERY 8 HOURS
Status: DISCONTINUED | OUTPATIENT
Start: 2017-09-18 | End: 2017-09-18 | Stop reason: HOSPADM

## 2017-09-18 RX ORDER — SUCRALFATE 1 G/1
1 TABLET ORAL
Qty: 112 TAB | Refills: 2 | Status: SHIPPED | OUTPATIENT
Start: 2017-09-18

## 2017-09-18 RX ORDER — SODIUM CHLORIDE 9 MG/ML
INJECTION, SOLUTION INTRAVENOUS
Status: DISCONTINUED | OUTPATIENT
Start: 2017-09-18 | End: 2017-09-18 | Stop reason: HOSPADM

## 2017-09-18 RX ORDER — BENZONATATE 100 MG/1
100 CAPSULE ORAL
Status: DISCONTINUED | OUTPATIENT
Start: 2017-09-18 | End: 2017-09-18 | Stop reason: HOSPADM

## 2017-09-18 RX ORDER — PROPOFOL 10 MG/ML
INJECTION, EMULSION INTRAVENOUS AS NEEDED
Status: DISCONTINUED | OUTPATIENT
Start: 2017-09-18 | End: 2017-09-18 | Stop reason: HOSPADM

## 2017-09-18 RX ORDER — LIDOCAINE HYDROCHLORIDE 20 MG/ML
INJECTION, SOLUTION EPIDURAL; INFILTRATION; INTRACAUDAL; PERINEURAL AS NEEDED
Status: DISCONTINUED | OUTPATIENT
Start: 2017-09-18 | End: 2017-09-18 | Stop reason: HOSPADM

## 2017-09-18 RX ADMIN — MORPHINE SULFATE 2 MG: 2 INJECTION, SOLUTION INTRAMUSCULAR; INTRAVENOUS at 08:55

## 2017-09-18 RX ADMIN — LIDOCAINE HYDROCHLORIDE 60 MG: 20 INJECTION, SOLUTION EPIDURAL; INFILTRATION; INTRACAUDAL; PERINEURAL at 14:45

## 2017-09-18 RX ADMIN — FAMOTIDINE 20 MG: 10 INJECTION, SOLUTION INTRAVENOUS at 08:55

## 2017-09-18 RX ADMIN — PROPOFOL 150 MG: 10 INJECTION, EMULSION INTRAVENOUS at 14:51

## 2017-09-18 RX ADMIN — SODIUM CHLORIDE 1250 MG: 900 INJECTION, SOLUTION INTRAVENOUS at 02:04

## 2017-09-18 RX ADMIN — GUAIFENESIN 600 MG: 600 TABLET, EXTENDED RELEASE ORAL at 08:55

## 2017-09-18 RX ADMIN — MORPHINE SULFATE 2 MG: 2 INJECTION, SOLUTION INTRAMUSCULAR; INTRAVENOUS at 01:52

## 2017-09-18 RX ADMIN — MORPHINE SULFATE 2 MG: 2 INJECTION, SOLUTION INTRAMUSCULAR; INTRAVENOUS at 13:35

## 2017-09-18 RX ADMIN — PROPOFOL 150 MG: 10 INJECTION, EMULSION INTRAVENOUS at 14:49

## 2017-09-18 RX ADMIN — ACETAMINOPHEN 650 MG: 325 TABLET, FILM COATED ORAL at 03:33

## 2017-09-18 RX ADMIN — PROPOFOL 150 MG: 10 INJECTION, EMULSION INTRAVENOUS at 14:47

## 2017-09-18 RX ADMIN — Medication 10 ML: at 16:09

## 2017-09-18 RX ADMIN — SODIUM CHLORIDE 1250 MG: 900 INJECTION, SOLUTION INTRAVENOUS at 09:17

## 2017-09-18 RX ADMIN — BENZONATATE 100 MG: 100 CAPSULE ORAL at 01:53

## 2017-09-18 RX ADMIN — Medication 10 ML: at 09:17

## 2017-09-18 RX ADMIN — SODIUM CHLORIDE: 9 INJECTION, SOLUTION INTRAVENOUS at 14:42

## 2017-09-18 RX ADMIN — PROPOFOL 150 MG: 10 INJECTION, EMULSION INTRAVENOUS at 14:45

## 2017-09-18 RX ADMIN — MORPHINE SULFATE 2 MG: 2 INJECTION, SOLUTION INTRAMUSCULAR; INTRAVENOUS at 16:07

## 2017-09-18 RX ADMIN — CEFTRIAXONE 1 G: 1 INJECTION, POWDER, FOR SOLUTION INTRAMUSCULAR; INTRAVENOUS at 13:35

## 2017-09-18 NOTE — PROGRESS NOTES
1945: Bedside shift change report given to YULISA Garduno (oncoming nurse) by Jamar Chery RN (offgoing nurse). Report included the following information SBAR, Kardex, Procedure Summary, Intake/Output and MAR.  
 
0030: Pt. Resting quietly, worried about her lost cell phone and anxious about her plan of care, no other concerns at this time. 0300: Pt. Resting quietly, no concerns at this time. 0745: Bedside shift change report given to Shaheen Virk RN (oncoming nurse) by Jamar Chery RN (offgoing nurse). Report included the following information SBAR, Kardex, Procedure Summary, Intake/Output and MAR.

## 2017-09-18 NOTE — ANESTHESIA PREPROCEDURE EVALUATION
Anesthetic History No history of anesthetic complications PONV Review of Systems / Medical History Patient summary reviewed and pertinent labs reviewed Pulmonary Within defined limits Neuro/Psych Within defined limits Cardiovascular Within defined limits Exercise tolerance: >4 METS 
  
GI/Hepatic/Renal 
Within defined limits Endo/Other Morbid obesity Other Findings Comments:  
Risk Factors for Postoperative nausea/vomiting: 
     History of postoperative nausea/vomiting? NO Female? YES Motion sickness? NO Intended opioid administration for postoperative analgesia? NO Smoking Abstinence Current Smoker? NO Elective Surgery? YES Seen preoperatively by anesthesiologist or proxy prior to day of surgery? YES Pt abstained from smoking 24 hours prior to anesthesia? N/A Physical Exam 
 
Airway Mallampati: II 
TM Distance: 4 - 6 cm Neck ROM: normal range of motion Mouth opening: Normal 
 
 Cardiovascular Regular rate and rhythm,  S1 and S2 normal,  no murmur, click, rub, or gallop Rhythm: regular Rate: normal 
 
 
 
 Dental 
 
Dentition: Lower dentition intact and Upper dentition intact Pulmonary Breath sounds clear to auscultation Abdominal 
GI exam deferred Other Findings Anesthetic Plan ASA: 2 Anesthesia type: general 
 
 
 
 
Induction: Intravenous Anesthetic plan and risks discussed with: Patient

## 2017-09-18 NOTE — ANESTHESIA POSTPROCEDURE EVALUATION
Post-Anesthesia Evaluation and Assessment Patient: Chastity Kirk MRN: [id number]  SSN: [id number] YOB: 1985  Age: 29 y.o. Sex: female Data from PACU flowsheet Cardiovascular Function/Vital Signs Visit Vitals  BP 98/64  Pulse 85  Temp 37.1 °C (98.7 °F)  Resp 20  
 Ht 5' 11\" (1.803 m)  Wt 119.4 kg (263 lb 4.8 oz)  SpO2 94%  Breastfeeding No  
 BMI 36.72 kg/m2 Patient is status post MAC anesthesia for Procedure(s): ESOPHAGOGASTRODUODENOSCOPY (EGD). Nausea/Vomiting: controlled Postoperative hydration reviewed and adequate. Pain: 
Pain Scale 1: Numeric (0 - 10) (09/18/17 1505) Pain Intensity 1: 6 (09/18/17 1505) Managed Mental Status and Level of Consciousness: Alert and oriented Pulmonary Status:  
O2 Device: Room air (09/18/17 1505) Adequate oxygenation and airway patent Complications related to anesthesia: None Post-anesthesia assessment completed. No concerns Signed By: Ankur Shen MD   
 September 18, 2017

## 2017-09-18 NOTE — ROUTINE PROCESS
TRANSFER - OUT REPORT: 
 
Verbal report given to YULISA Medina (name) on Chastity Kirk  being transferred to [facility](unit) for routine post-op Report consisted of patients Situation, Background, Assessment and  
Recommendations(SBAR). Information from the following report(s) SBAR and Recent Results was reviewed with the receiving nurse. Lines:  
Peripheral IV 09/16/17 Left Antecubital (Active) Site Assessment Clean, dry, & intact 9/18/2017  3:07 PM  
Phlebitis Assessment 0 9/18/2017  3:07 PM  
Infiltration Assessment 0 9/18/2017  3:07 PM  
Dressing Status Clean, dry, & intact 9/18/2017  3:07 PM  
Dressing Type Tape;Transparent 9/18/2017  3:07 PM  
Hub Color/Line Status Pink;Capped 9/18/2017  3:07 PM  
Action Taken Open ports on tubing capped 9/17/2017  8:48 PM  
Alcohol Cap Used Yes 9/17/2017  8:48 PM  
   
Peripheral IV 09/16/17 Right Antecubital (Active) Site Assessment Clean, dry, & intact 9/17/2017  8:48 PM  
Phlebitis Assessment 0 9/17/2017  8:48 PM  
Infiltration Assessment 0 9/17/2017  8:48 PM  
Dressing Status Clean, dry, & intact 9/17/2017  8:48 PM  
Dressing Type Transparent 9/17/2017  8:48 PM  
Hub Color/Line Status Blue;Patent; Flushed 9/17/2017  8:48 PM  
Action Taken Open ports on tubing capped 9/17/2017  8:48 PM  
Alcohol Cap Used Yes 9/17/2017  8:48 PM  
   
Peripheral IV 09/16/17 Left Antecubital (Active) Site Assessment Clean, dry, & intact 9/18/2017  3:07 PM  
Phlebitis Assessment 0 9/18/2017  3:07 PM  
Infiltration Assessment 0 9/18/2017  3:07 PM  
Dressing Status Clean, dry, & intact 9/18/2017  3:07 PM  
Dressing Type Tape;Transparent 9/18/2017  3:07 PM  
Hub Color/Line Status Green; Infusing 9/18/2017  3:07 PM  
Action Taken Open ports on tubing capped 9/17/2017  8:48 PM  
Alcohol Cap Used Yes 9/17/2017  8:48 PM  
  
 
Opportunity for questions and clarification was provided. Patient transported with: 
 [employer]

## 2017-09-18 NOTE — OP NOTES
9/18/2017    PREOPERATIVE DIAGNOSIS: nausea, probable marginal ulcer    POSTOPERATIVE DIAGNOSIS: Marginal ulcer, thrush    PROCEDURES PERFORMED: Esophagogastrojejunoscopy     SURGEON: Janneth Britt. Taylor Mccray MD, FACS    ANESTHETIC: MAC anesthetic. ESTIMATED BLOOD LOSS: 0 mL. SPECIMEN: none    FINDINGS:   1. LES at 42 cm  2. GEJ at 47 cm  3. GEJ Diameter 18 cm  4. Esophageal candidiasis  5. Gastritis  6. Marginal ulcer x 2 with erythema and irritation  7. Normal purvi limb to 90 cm    COMPLICATIONS: None  IMPLANTS: None    DESCRIPTION OF PROCEDURE: Patient was sedated in the endoscopy suite and an endoscope was passed transorally through the esophagus into the gastric pouch. On entering the gastric pouch, the gastroesophageal junction was noted to be well defined at 42 cm, without hiatal hernia. Gastrojejunal anastomosis was identified next at 47 cm. No evidence of a gastrogastric fistula was noted. The scope was passed through the anastomosis. Based upon the size of the jejunum just below the anastomosis, I was not able to retroflex the scope but was able to examine the entire back side of the anastomosis. There were 2 ulcers, both superficial and one of which had 3 attached staples which were not removed. The endoscope was then passed approximately 40 cm distal in the Purvi limb. The Purvi limb was normal throughout its entire examined length. The scope was then withdrawn back up into the area of the gastric pouch. Air was suctioned from the bowel and the endoscope was removed through the esophagus. No additional pathology identified.

## 2017-09-18 NOTE — ROUTINE PROCESS
0730  Bedside and Verbal shift change report given to Niharika Ch (oncoming nurse) by Efren Nunez (offgoing nurse). Report included the following information SBAR, Kardex, Intake/Output and MAR.  
 
1400  Pt taken to endo 
 
1730  Pt given discharge instructions and opportunity for questions/clarifications. Pt verbalized understanding

## 2017-09-18 NOTE — DISCHARGE INSTRUCTIONS
Abdominal Pain: Care Instructions  Your Care Instructions    Abdominal pain has many possible causes. Some aren't serious and get better on their own in a few days. Others need more testing and treatment. If your pain continues or gets worse, you need to be rechecked and may need more tests to find out what is wrong. You may need surgery to correct the problem. Don't ignore new symptoms, such as fever, nausea and vomiting, urination problems, pain that gets worse, and dizziness. These may be signs of a more serious problem. Your doctor may have recommended a follow-up visit in the next 8 to 12 hours. If you are not getting better, you may need more tests or treatment. The doctor has checked you carefully, but problems can develop later. If you notice any problems or new symptoms, get medical treatment right away. Follow-up care is a key part of your treatment and safety. Be sure to make and go to all appointments, and call your doctor if you are having problems. It's also a good idea to know your test results and keep a list of the medicines you take. How can you care for yourself at home? · Rest until you feel better. · To prevent dehydration, drink plenty of fluids, enough so that your urine is light yellow or clear like water. Choose water and other caffeine-free clear liquids until you feel better. If you have kidney, heart, or liver disease and have to limit fluids, talk with your doctor before you increase the amount of fluids you drink. · If your stomach is upset, eat mild foods, such as rice, dry toast or crackers, bananas, and applesauce. Try eating several small meals instead of two or three large ones. · Wait until 48 hours after all symptoms have gone away before you have spicy foods, alcohol, and drinks that contain caffeine. · Do not eat foods that are high in fat. · Avoid anti-inflammatory medicines such as aspirin, ibuprofen (Advil, Motrin), and naproxen (Aleve).  These can cause stomach upset. Talk to your doctor if you take daily aspirin for another health problem. When should you call for help? Call 911 anytime you think you may need emergency care. For example, call if:  · You passed out (lost consciousness). · You pass maroon or very bloody stools. · You vomit blood or what looks like coffee grounds. · You have new, severe belly pain. Call your doctor now or seek immediate medical care if:  · Your pain gets worse, especially if it becomes focused in one area of your belly. · You have a new or higher fever. · Your stools are black and look like tar, or they have streaks of blood. · You have unexpected vaginal bleeding. · You have symptoms of a urinary tract infection. These may include:  ¨ Pain when you urinate. ¨ Urinating more often than usual.  ¨ Blood in your urine. · You are dizzy or lightheaded, or you feel like you may faint. Watch closely for changes in your health, and be sure to contact your doctor if:  · You are not getting better after 1 day (24 hours). Where can you learn more? Go to http://michelleWhite Opsnabila.info/. Enter E372 in the search box to learn more about \"Abdominal Pain: Care Instructions. \"  Current as of: March 20, 2017  Content Version: 11.3  © 1676-5372 [employer]. Care instructions adapted under license by [employer] (which disclaims liability or warranty for this information). If you have questions about a medical condition or this instruction, always ask your healthcare professional. [place] [address] any warranty or liability for your use of this information. Fainting: Care Instructions  Your Care Instructions    When you faint, or pass out, you lose consciousness for a short time. A brief drop in blood flow to the brain often causes it. When you fall or lie down, more blood flows to your brain and you regain consciousness.   Emotional stress, pain, or overheating--especially if you have been standing--can make you faint. In these cases, fainting is usually not serious. But fainting can be a sign of a more serious problem. Your doctor may want you to have more tests to rule out other causes. The treatment you need depends on the reason why you fainted. The doctor has checked you carefully, but problems can develop later. If you notice any problems or new symptoms, get medical treatment right away. Follow-up care is a key part of your treatment and safety. Be sure to make and go to all appointments, and call your doctor if you are having problems. It's also a good idea to know your test results and keep a list of the medicines you take. How can you care for yourself at home? · Drink plenty of fluids to prevent dehydration. If you have kidney, heart, or liver disease and have to limit fluids, talk with your doctor before you increase your fluid intake. When should you call for help? Call 911 anytime you think you may need emergency care. For example, call if:  · You have symptoms of a heart problem. These may include:  ¨ Chest pain or pressure. ¨ Severe trouble breathing. ¨ A fast or irregular heartbeat. ¨ Lightheadedness or sudden weakness. ¨ Coughing up pink, foamy mucus. ¨ Passing out. After you call 911, the  may tell you to chew 1 adult-strength or 2 to 4 low-dose aspirin. Wait for an ambulance. Do not try to drive yourself. · You have symptoms of a stroke. These may include:  ¨ Sudden numbness, tingling, weakness, or loss of movement in your face, arm, or leg, especially on only one side of your body. ¨ Sudden vision changes. ¨ Sudden trouble speaking. ¨ Sudden confusion or trouble understanding simple statements. ¨ Sudden problems with walking or balance. ¨ A sudden, severe headache that is different from past headaches. · You passed out (lost consciousness) again.   Watch closely for changes in your health, and be sure to contact your doctor if:  · You do not get better as expected. Where can you learn more? Go to http://michelle-nabila.info/. Enter Q389 in the search box to learn more about \"Fainting: Care Instructions. \"  Current as of: March 20, 2017  Content Version: 11.3  © 1532-0035 [employer]. Care instructions adapted under license by [employer] (which disclaims liability or warranty for this information). If you have questions about a medical condition or this instruction, always ask your healthcare professional. [address] any warranty or liability for your use of this information. Hypokalemia: Care Instructions  Your Care Instructions  Hypokalemia (say \"se-jg-[id number]-DAILY-tanner-uh\") is a low level of potassium. The heart, muscles, kidneys, and nervous system all need potassium to work well. This problem has many different causes. Kidney problems, diet, and medicines like diuretics and laxatives can cause it. So can vomiting or diarrhea. In some cases, cancer is the cause. Your doctor may do tests to find the cause of your low potassium levels. You may need medicines to bring your potassium levels back to normal. You may also need regular blood tests to check your potassium. If you have very low potassium, you may need intravenous (IV) medicines. You also may need tests to check the electrical activity of your heart. Heart problems caused by low potassium levels can be very serious. Follow-up care is a key part of your treatment and safety. Be sure to make and go to all appointments, and call your doctor if you are having problems. It's also a good idea to know your test results and keep a list of the medicines you take. How can you care for yourself at home? · If your doctor recommends it, eat foods that have a lot of potassium. These include fresh fruits, juices, and vegetables. They also include nuts, beans, and milk. · Be safe with medicines.  If your doctor prescribes medicines or potassium supplements, take them exactly as directed. Call your doctor if you have any problems with your medicines. · Get your potassium levels tested as often as your doctor tells you. When should you call for help? Call 911 anytime you think you may need emergency care. For example, call if:  · You feel like your heart is missing beats. Heart problems caused by low potassium can cause death. · You passed out (lost consciousness). · You have a seizure. Call your doctor now or seek immediate medical care if:  · You feel weak or unusually tired. · You have severe arm or leg cramps. · You have tingling or numbness. · You feel sick to your stomach, or you vomit. · You have belly cramps. · You feel bloated or constipated. · You have to urinate a lot. · You feel very thirsty most of the time. · You are dizzy or lightheaded, or you feel like you may faint. · You feel depressed, or you lose touch with reality. Watch closely for changes in your health, and be sure to contact your doctor if:  · You do not get better as expected. Where can you learn more? Go to http://michelle-nabila.info/. Enter G358 in the search box to learn more about \"Hypokalemia: Care Instructions. \"  Current as of: July 28, 2016  Content Version: 11.3  © 7242-4642 [employer]. Care instructions adapted under license by [employer] (which disclaims liability or warranty for this information). If you have questions about a medical condition or this instruction, always ask your healthcare professional. [place] [address] any warranty or liability for your use of this information.
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
## 2017-09-18 NOTE — PROGRESS NOTES
Nutrition initial assessment/Plan of care RECOMMENDATIONS:  
 
1. Clear liquids; Advance diet when medically indicated/as tolerated 2. Monitor weight, labs and PO intake 3. RD to follow GOALS:  
 
1. PO intake meets >75% of protein/calorie needs by 9/23 2. Weight Maintenance/Gradual weight loss (1-2 lb by 9/25) ASSESSMENT:  
 
Weight status is classified as obese per BMI of 36.7. Tolerating diet. Labs noted. Nutrition recommendations listed. RD to follow. Nutrition Diagnoses:  
Obesity related to prior excessive energy intake as evidenced by BMI of 36.7. Inadequate oral intake related to nausea/vomiting as evidenced by minimal PO intake x 4 days. Nutrition Risk:  [] High  [x] Moderate []  Low SUBJECTIVE/OBJECTIVE:  
 
Transferred from ICU. [facility] nutrition screen for >33 lb weight loss. However, patient had gastric bypass in July 2017. Weight of 320 lb prior to surgery. Weight has been stable over past month at 264 lb. Patient with nausea, vomiting and abdominal pain for 4 days PTA. Abdominal pain currently improved and nausea/vomiting has resolved. Tolerating clear liquids and plan for EGD procedure this afternoon. Will monitor. Information Obtained from:  
 [x] Chart Review [x] Patient 
 [] Family/Caregiver 
 [] Nurse/Physician 
 [] Interdisciplinary Meeting/Rounds Diet: Clear liquids Medications: [x] Reviewed Allergies: [x] Reviewed Encounter Diagnoses ICD-10-CM ICD-9-CM 1. Tylenol overdose, accidental or unintentional, initial encounter T39.1X1A 965.4 E850.4 2. Hypokalemia E87.6 276.8 3. Elevated LFTs R94.5 790.6 4. Nausea and vomiting, intractability of vomiting not specified, unspecified vomiting type R11.2 787.01  
5. Abdominal pain, generalized R10.84 789.07 Past Medical History:  
Diagnosis Date  Anxiety  Ill-defined condition HPV  Plantar fasciitis  Spondylosis  UTI (urinary tract infection) Labs:  Lab Results Component Value Date/Time Sodium 140 09/18/2017 05:21 AM  
 Potassium 3.3 09/18/2017 05:21 AM  
 Chloride 109 09/18/2017 05:21 AM  
 CO2 23 09/18/2017 05:21 AM  
 Anion gap 8 09/18/2017 05:21 AM  
 Glucose 101 09/18/2017 05:21 AM  
 BUN 1 09/18/2017 05:21 AM  
 Creatinine 0.29 09/18/2017 05:21 AM  
 Calcium 7.8 09/18/2017 05:21 AM  
 Magnesium 1.7 09/15/2017 07:55 PM  
 Albumin 2.0 09/18/2017 05:21 AM  
 
Anthropometrics: BMI (calculated): 36.7 Last 3 Recorded Weights in this Encounter 09/15/17 1919 09/16/17 0109 Weight: 117.9 kg (260 lb) 119.4 kg (263 lb 4.8 oz) Ht Readings from Last 1 Encounters:  
09/16/17 5' 11\" (1.803 m) Patient Vitals for the past 100 hrs: 
 % Diet Eaten 09/17/17 1900 100 % 09/16/17 1600 0 % 09/16/17 1200 0 % 09/16/17 0800 0 % IBW: 155 lb %IBW: 170% Estimated Nutrition Needs: [x] MSJ  [] Other: 
Calories: 2010 Kcal Based on:   [x] Actual BW   
Protein:    g Based on:   [x] Actual BW Fluid:       2010 ml Based on:   [x] Actual BW  
 
 [x] No Cultural, Sabianist or ethnic dietary need identified. [] Cultural, Sabianist and ethnic food preferences identified and addressed Wt Status:  [] Normal (18.6 - 24.9) [] Underweight (<18.5) [] Overweight (25 - 29.9) [] Mild Obesity (30 - 34.9)  [x] Moderate Obesity (35 - 39.9) [] Morbid Obesity (40+) Nutrition Problems Identified:  
[x] Suboptimal PO intake  
[] Food Allergies [] Difficulty chewing/swallowing/poor dentition 
[] Constipation/Diarrhea [x] Nausea/Vomiting (resolved) [] None 
[] Other:  
 
Plan:  
[x] Therapeutic Diet 
[]  Obtained/adjusted food preferences/tolerances and/or snacks options  
[]  Supplements added  
[] Occupational therapy following for feeding techniques []  HS snack added  
[]  Modify diet texture  
[]  Modify diet for food allergies []  Assist with menu selection  
[x]  Monitor PO intake on meal rounds  
[x]  Continue inpatient monitoring and intervention [] Participated in discharge planning/Interdisciplinary rounds/Team meetings  
[]  Other:  
 
Education Needs: 
 [] Not appropriate for teaching at this time due to: 
 [x] Identified and addressed Nutrition Monitoring and Evaluation: 
[x] Continue ongoing monitoring and intervention 
[] Other [address]

## 2017-09-18 NOTE — ROUTINE PROCESS
TRANSFER - IN REPORT: 
 
Verbal report received from [place] (name) on yasmeen [place]  being received from [facility](unit) for ordered procedure Report consisted of patients Situation, Background, Assessment and  
Recommendations(SBAR). Information from the following report(s) SBAR was reviewed with the receiving nurse. Opportunity for questions and clarification was provided. Assessment completed upon patients arrival to unit and care assumed.

## 2017-09-18 NOTE — PROGRESS NOTES
Surgery Pt reports abdominal pain is nearly gone. No nausea or vomiting with clear liquids. She now admits she started smoking again less than a month ago. Patient Vitals for the past 12 hrs: 
 Temp Pulse Resp BP SpO2  
09/18/17 0836 98.7 °F (37.1 °C) 90 20 117/71 96 % 09/18/17 0549 98.8 °F (37.1 °C) 65 20 99/62 99 % 09/18/17 0142 99.1 °F (37.3 °C) 90 20 106/63 93 % 09/17/17 2150 99.4 °F (37.4 °C) 88 20 105/68 97 % Date 09/17/17 0700 - 09/18/17 8653 09/18/17 0700 - 09/19/17 8765 Shift 3292-6236 3903-6462 24 Hour Total 5046-6003 6261-6766 24 Hour Total  
I 
N 
T 
A 
K 
E 
 P.O. 360 480 840     
   P. O. 360 480 840 I.V. 
(mL/kg/hr) 1917.5 
(1.3)  1917.5 
(0.7) Volume (dextrose 5 % - 0.45% NaCl infusion) 1767.5  1767.5 Volume (cefTRIAXone (ROCEPHIN) 1 g in 0.9% sodium chloride (MBP/ADV) 50 mL MBP) 50  50 Volume (vancomycin (VANCOCIN) 1,250 mg in 0.9% sodium chloride 250 mL IVPB) 0  0 Volume (potassium chloride 10 mEq in 100 ml IVPB) 100  100 Shift Total 
(mL/kg) 2277.5 
(19.1) 480 
(4) 2757.5 
(23.1) O 
U T 
P 
U Alevism Roldan Urine (mL/kg/hr) 1300 
(0.9)  1300 
(0.5) Urine Voided 1300  1300 Urine Occurrence(s) 1 x  1 x Shift Total 
(mL/kg) 1300 
(10.9)  1300 
(10.9) .5 480 1457.5 Weight (kg) 119.4 119.4 119.4 119.4 119.4 119.4 Abd: soft, ND, minimally tender Recent Results (from the past 12 hour(s)) CBC WITH AUTOMATED DIFF Collection Time: 09/18/17  5:21 AM  
Result Value Ref Range WBC 3.9 (L) 4.6 - 13.2 K/uL  
 RBC 4.03 (L) 4.20 - 5.30 M/uL  
 HGB 11.5 (L) 12.0 - 16.0 g/dL HCT 35.0 35.0 - 45.0 % MCV 86.8 74.0 - 97.0 FL  
 MCH 28.5 24.0 - 34.0 PG  
 MCHC 32.9 31.0 - 37.0 g/dL  
 RDW 14.0 11.6 - 14.5 % PLATELET 203 (L) 057 - 420 K/uL MPV 12.0 (H) 9.2 - 11.8 FL  
 NEUTROPHILS 33 (L) 40 - 73 % LYMPHOCYTES 51 21 - 52 % MONOCYTES 11 (H) 3 - 10 % EOSINOPHILS 3 0 - 5 %  BASOPHILS 2 0 - 2 % ABS. NEUTROPHILS 1.3 (L) 1.8 - 8.0 K/UL  
 ABS. LYMPHOCYTES 2.0 0.9 - 3.6 K/UL  
 ABS. MONOCYTES 0.4 0.05 - 1.2 K/UL  
 ABS. EOSINOPHILS 0.1 0.0 - 0.4 K/UL  
 ABS. BASOPHILS 0.1 (H) 0.0 - 0.06 K/UL  
 DF AUTOMATED METABOLIC PANEL, COMPREHENSIVE Collection Time: 09/18/17  5:21 AM  
Result Value Ref Range Sodium 140 136 - 145 mmol/L Potassium 3.3 (L) 3.5 - 5.5 mmol/L Chloride 109 (H) 100 - 108 mmol/L  
 CO2 23 21 - 32 mmol/L Anion gap 8 3.0 - 18 mmol/L Glucose 101 (H) 74 - 99 mg/dL BUN 1 (L) 7.0 - 18 MG/DL Creatinine 0.29 (L) 0.6 - 1.3 MG/DL  
 BUN/Creatinine ratio 3 (L) 12 - 20 GFR est AA >60 >60 ml/min/1.73m2 GFR est non-AA >60 >60 ml/min/1.73m2 Calcium 7.8 (L) 8.5 - 10.1 MG/DL Bilirubin, total 0.7 0.2 - 1.0 MG/DL  
 ALT (SGPT) 78 (H) 13 - 56 U/L  
 AST (SGOT) 82 (H) 15 - 37 U/L Alk. phosphatase 80 45 - 117 U/L Protein, total 4.5 (L) 6.4 - 8.2 g/dL Albumin 2.0 (L) 3.4 - 5.0 g/dL Globulin 2.5 2.0 - 4.0 g/dL A-G Ratio 0.8 0.8 - 1.7 Imp:  
 
1. Nausea and vomiting after LGBP - thiamine repleted - symptoms resolved 2. Abdominal pain - given smoking history, this is likely a marginal ulcer 3. Tylenol - no issue 4. UTI - treated 5. Nirmala Akanksha sputum - likely productive with resumed ciliary action - CTA did show infiltrate - defer to medicine Plan:  
 
Replete hypokalemia EGD today to confirm ulcer - if present will need to discharge on PPI and carafate and will need another scope in 6 weeks to confirm clearance Patient strongly counseled of need to stop smoking as she contracted with me pre-op

## 2017-09-19 NOTE — PROGRESS NOTES
Care Management Interventions PCP Verified by CM: Yes 
Palliative Care Consult (Criteria: CHF and RRAT>21): No 
Reason for No Palliative Care Consult: Other (see comment) (na) Mode of Transport at Discharge: Other (see comment) () Transition of Care Consult (CM Consult): Discharge Planning Physical Therapy Consult: No 
Occupational Therapy Consult: No 
Current Support Network: Other (lives with  and 2 children, a 5 yr old and 3 yr old) Confirm Follow Up Transport: Family Plan discussed with Pt/Family/Caregiver: Yes Discharge Location Discharge Placement: Home

## 2017-09-20 LAB
BACTERIA SPEC CULT: ABNORMAL
GRAM STN SPEC: ABNORMAL
HIV 1+2 AB+HIV1 P24 AG SERPL QL IA: NONREACTIVE
HIV12 RESULT COMMENT, HHIVC: NORMAL
SERVICE CMNT-IMP: ABNORMAL

## 2017-09-22 LAB
BACTERIA SPEC CULT: NORMAL
BACTERIA SPEC CULT: NORMAL
SERVICE CMNT-IMP: NORMAL
SERVICE CMNT-IMP: NORMAL

## 2017-09-25 ENCOUNTER — TELEPHONE (OUTPATIENT)
Dept: SURGERY | Age: 32
End: 2017-09-25

## 2017-09-29 LAB
AMPHET UR QL SCN: NEGATIVE
BARBITURATES UR QL SCN: NEGATIVE
BENZODIAZ UR QL: POSITIVE
BENZODIAZ UR QL: POSITIVE
CANNABINOIDS UR QL SCN: NEGATIVE
COCAINE UR QL SCN: NEGATIVE
CODEINE UR QL: NEGATIVE
HDSCOM,HDSCOM: ABNORMAL
HYDROCODONE UR CFM-MCNC: 376 NG/ML
HYDROCODONE UR QL: POSITIVE
HYDROMORPHONE UR CFM-MCNC: 577 NG/ML
HYDROMORPHONE UR QL: POSITIVE
METHADONE UR QL: NEGATIVE
MORPHINE UR CFM-MCNC: 2480 NG/ML
MORPHINE UR QL: POSITIVE
NORDIAZEPAM UR QL: NEGATIVE
OH-ALPRAZ UR CFM-MCNC: 2560 NG/ML
OH-ALPRAZ UR QL CFM: POSITIVE
OPIATES UR QL: POSITIVE
OPIATES UR QL: POSITIVE NG/ML
OXAZEPAM UR QL: NEGATIVE
PCP UR QL: NEGATIVE

## 2017-10-04 ENCOUNTER — OFFICE VISIT (OUTPATIENT)
Dept: SURGERY | Age: 32
End: 2017-10-04

## 2017-10-04 VITALS
WEIGHT: 252 LBS | DIASTOLIC BLOOD PRESSURE: 64 MMHG | HEART RATE: 72 BPM | RESPIRATION RATE: 18 BRPM | BODY MASS INDEX: 35.28 KG/M2 | TEMPERATURE: 98.1 F | SYSTOLIC BLOOD PRESSURE: 98 MMHG | HEIGHT: 71 IN

## 2017-10-04 DIAGNOSIS — K91.2 POSTOPERATIVE MALABSORPTION: Primary | ICD-10-CM

## 2017-10-04 RX ORDER — MICONAZOLE NITRATE 2 %
2 CREAM WITH APPLICATOR VAGINAL 3 TIMES DAILY
COMMUNITY

## 2017-10-07 NOTE — PROGRESS NOTES
Subjective:      Genoveva Trent is a 29 y.o. female is now 3 months status post laparoscopic gastric bypass surgery with recent admission for marginal ulcer after resuming smoking. She is still having a little pain but hs not been taking carafate. She is not smoking. Currently on a stage 4 diet without difficulty. Taking in 64oz water,  60g protein. 30min of activity daily. Bowel movements are regular. Pain is controlled without any medications. .  The patient is compliant with multivitamins, calcium and B12 supplements. Weight Loss Metrics 10/4/2017 2017 9/15/2017 2017 2017 2017 2017   Pre op / Initial Wt - - - 320 - - -   Today's Wt 252 lb 263 lb 4.8 oz - - 280 lb 319 lb -   BMI 35.15 kg/m2 - 36.72 kg/m2 - 39.05 kg/m2 44.49 kg/m2 44.63 kg/m2   Ideal Body Wt - - - 152 - - -   Excess Body Wt - - - 168 - - -   Goal Wt - - - 186 - - -   Wt loss to date - - - 40 - - -   % Wt Loss - - - 0.3 - - -   80% EBW - - - 134.4 - - -       Body mass index is 35.15 kg/(m^2).     Comorbidities:    Hypertension: not applicable  Diabetes: not applicable  Obstructive Sleep Apnea: not applicable  Hyperlipidemia: not applicable  Stress Urinary Incontinence: not applicable  Gastroesophageal Reflux: resolved  Weight related arthropathy:not applicable        Past Medical History:   Diagnosis Date    Anxiety     Ill-defined condition     HPV    Plantar fasciitis     PUD (peptic ulcer disease)     Spondylosis     UTI (urinary tract infection)        Past Surgical History:   Procedure Laterality Date    HX APPENDECTOMY      HX  SECTION      HX GASTRIC BYPASS  2017    HX LIPOMA RESECTION      HX OTHER SURGICAL      plantar faciitis  repair    HX SHOULDER ARTHROSCOPY      cartilage repair    HX TONSILLECTOMY         Current Outpatient Prescriptions   Medication Sig Dispense Refill    calcium citrate-vitamin D3 (CITRACAL WITH VITAMIN D MAXIMUM) tablet Take 2 Tabs by mouth three (3) times daily.  pantoprazole (PROTONIX) 40 mg tablet Take 1 Tab by mouth Before breakfast and dinner. 60 Tab 2    sucralfate (CARAFATE) 1 gram tablet Take 1 Tab by mouth Before breakfast, lunch, dinner and at bedtime. 112 Tab 2    VENLAFAXINE HCL (EFFEXOR XR PO) Take  by mouth.  dextroamphetamine-amphetamine (ADDERALL) 20 mg tablet Take 20 mg by mouth.  zolpidem (AMBIEN) 10 mg tablet       MULTIVIT-MINERALS/FERROUS FUM (MULTI VITAMIN PO) Take 1 Tab by mouth two (2) times a day.  cyanocobalamin 1,000 mcg tablet Take 1,000 mcg by mouth daily.  ALPRAZolam (XANAX) 2 mg tablet TK 1 T PO BID PRF ANXIETY  2    cholecalciferol (VITAMIN D3) 1,000 unit cap Take 5,000 Units by mouth daily.  ondansetron (ZOFRAN ODT) 4 mg disintegrating tablet Take 1 Tab by mouth every six (6) hours as needed.  Indications: PREVENTION OF POST-OPERATIVE NAUSEA AND VOMITING 30 Tab 0       Allergies   Allergen Reactions    Prednisone Other (comments)     Makes very extreme aggression    Chantix [Varenicline] Other (comments)     syncope    Nsaids (Non-Steroidal Anti-Inflammatory Drug) Other (comments)     No after bariatric sx         Objective:     Visit Vitals    BP 98/64    Pulse 72    Temp 98.1 °F (36.7 °C)    Resp 18    Ht 5' 11\" (1.803 m)    Wt 114.3 kg (252 lb)    BMI 35.15 kg/m2       General:  alert, cooperative, no distress, appears stated age   Chest: no accessory muscle use   Cor:   Regular rate and rhythm   Abdomen: soft, bowel sounds active, non-tender   Incision:   no hernia, incision well approximated       Labs: none    Assessment:     Imprving with ulcer treatment    Plan:     Increase fluids, Follow up with Registered Dietician, Continue MVI/Ca/B12 supplementation and needs follow endo in 6 weeks  Follow up in 3 months

## 2017-10-24 NOTE — ANCILLARY DISCHARGE INSTRUCTIONS
[facility] Discharge Phone Call After-Care Discharge Phone Call Questions: 
 
Were you able to get your prescriptions filled? Comment:   
  [x] Yes  []No 
 
Comment if answer is \"No\" Are you taking your medication(s) as your doctor ordered? Do you understand the purpose of your medications? Comment: 
  [x] Yes  []No 
 
Comment if answer is \"No\" Are you taking any other medications that are not on the list? 
Comment:   
  [x] Yes  []No 
 
Comment if answer is \"Yes\" Do you have any questions about your medications? No  Are you aware of potential side effects? Yes  
Comment: 
  [x] Yes  [x]No 
 
Comment if answer is \"Yes\" Did you make your follow-up appointments (if the hospital did not do this before 
discharge)? Comment: 
  [x] Yes  []No 
 
Comment if answer is \"No\" Is there any reason you might not be able to keep your follow-up appointments? Comment:  
  [] Yes  [x]No 
 
Comment if answer is \"Yes\" Do you have any questions about your care plan? No Are you aware of what health problems to be alert for? Yes  
Comment: 
  [x] Yes  [x]No 
 
Comment if answer is \"Yes\" Do you have a good understanding of how you should manage your health? Comment: 
  [x] Yes  []No 
 
Comment if answer is \"Yes\" Do you know which symptoms to watch for that would mean you would need to call your doctor right away? Comment:   
  [x] Yes  []No 
 
Comment if answer is \"No\" Do you have any questions about the follow up process or any instructions that we have provided? Comment: 
  [] Yes  [x]No 
 
Comment if answer is \"Yes\" Did staff take your preferences into account? [x] Yes  []No 
 
Comment if answer is \"Yes\"

## 2017-10-30 ENCOUNTER — TELEPHONE (OUTPATIENT)
Dept: SURGERY | Age: 32
End: 2017-10-30

## 2017-11-21 ENCOUNTER — TELEPHONE (OUTPATIENT)
Dept: SURGERY | Age: 32
End: 2017-11-21

## 2018-01-02 ENCOUNTER — TELEPHONE (OUTPATIENT)
Dept: SURGERY | Age: 33
End: 2018-01-02

## 2018-01-02 NOTE — TELEPHONE ENCOUNTER
Called pt to confirm appt/labs for appt 1-10-18, voicemail was full, was not able to leave message-[email or web address]

## 2018-01-19 ENCOUNTER — OFFICE VISIT (OUTPATIENT)
Dept: ORTHOPEDIC SURGERY | Age: 33
End: 2018-01-19

## 2018-01-19 ENCOUNTER — TELEPHONE (OUTPATIENT)
Dept: ORTHOPEDIC SURGERY | Age: 33
End: 2018-01-19

## 2018-01-19 VITALS
DIASTOLIC BLOOD PRESSURE: 67 MMHG | SYSTOLIC BLOOD PRESSURE: 136 MMHG | RESPIRATION RATE: 12 BRPM | WEIGHT: 206.4 LBS | HEART RATE: 90 BPM | BODY MASS INDEX: 28.9 KG/M2 | HEIGHT: 71 IN

## 2018-01-19 DIAGNOSIS — M54.6 THORACIC BACK PAIN, UNSPECIFIED BACK PAIN LATERALITY, UNSPECIFIED CHRONICITY: Primary | ICD-10-CM

## 2018-01-19 DIAGNOSIS — M51.34 DDD (DEGENERATIVE DISC DISEASE), THORACIC: ICD-10-CM

## 2018-01-19 DIAGNOSIS — M43.06 LUMBAR SPONDYLOLYSIS: ICD-10-CM

## 2018-01-19 DIAGNOSIS — M54.5 CHRONIC LOW BACK PAIN, UNSPECIFIED BACK PAIN LATERALITY, WITH SCIATICA PRESENCE UNSPECIFIED: Primary | ICD-10-CM

## 2018-01-19 DIAGNOSIS — M54.50 LOW BACK PAIN WITHOUT SCIATICA, UNSPECIFIED BACK PAIN LATERALITY, UNSPECIFIED CHRONICITY: ICD-10-CM

## 2018-01-19 DIAGNOSIS — M51.36 DDD (DEGENERATIVE DISC DISEASE), LUMBAR: ICD-10-CM

## 2018-01-19 DIAGNOSIS — G89.29 CHRONIC LOW BACK PAIN, UNSPECIFIED BACK PAIN LATERALITY, WITH SCIATICA PRESENCE UNSPECIFIED: Primary | ICD-10-CM

## 2018-01-19 DIAGNOSIS — M47.814 THORACIC SPONDYLARTHRITIS: ICD-10-CM

## 2018-01-19 RX ORDER — CARISOPRODOL 250 MG/1
TABLET ORAL
COMMUNITY
Start: 2017-12-29

## 2018-01-19 RX ORDER — FLUCONAZOLE 150 MG/1
TABLET ORAL
COMMUNITY
Start: 2018-01-16

## 2018-01-19 RX ORDER — AZITHROMYCIN 250 MG/1
250 TABLET, FILM COATED ORAL DAILY
COMMUNITY
Start: 2018-01-16 | End: 2018-01-21

## 2018-01-19 RX ORDER — DEXTROAMPHETAMINE SACCHARATE, AMPHETAMINE ASPARTATE, DEXTROAMPHETAMINE SULFATE AND AMPHETAMINE SULFATE 7.5; 7.5; 7.5; 7.5 MG/1; MG/1; MG/1; MG/1
TABLET ORAL 2 TIMES DAILY
COMMUNITY
Start: 2018-01-09
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
## 2018-01-19 NOTE — MR AVS SNAPSHOT
[facility] 
 
 
 [address] [facility] 
[phone] Patient: Bessie Horton MRN: [id number] IJX:[birth year] Visit Information Date & Time Provider Department Dept. Phone Encounter #  
 1/19/2018  8:50 AM Lian Vega  [facility] [address] [facility] [place] [phone] [id number] Follow-up Instructions Return for after MRI. Upcoming Health Maintenance Date Due DTaP/Tdap/Td series (1 - Tdap) 8/6/2008 PAP AKA CERVICAL CYTOLOGY 8/6/2008 Influenza Age 5 to Adult 8/1/2017 Allergies as of 1/19/2018  Review Complete On: 1/19/2018 By: Lian Vega MD  
  
 Severity Noted Reaction Type Reactions Prednisone High 06/28/2017    Other (comments) Makes very extreme aggression Chantix [Varenicline]  01/10/2015    Other (comments)  
 syncope Nsaids (Non-steroidal Anti-inflammatory Drug)  06/28/2017    Other (comments) No after bariatric sx Current Immunizations  Never Reviewed No immunizations on file. Not reviewed this visit You Were Diagnosed With   
  
 Codes Comments Thoracic back pain, unspecified back pain laterality, unspecified chronicity    -  Primary ICD-10-CM: M54.6 ICD-9-CM: 724.1 Low back pain without sciatica, unspecified back pain laterality, unspecified chronicity     ICD-10-CM: M54.5 ICD-9-CM: 724.2 Lumbar spondylolysis     ICD-10-CM: M43.06 
ICD-9-CM: 738.4 DDD (degenerative disc disease), lumbar     ICD-10-CM: M51.36 
ICD-9-CM: 722.52 Thoracic spondylarthritis ([facility] [address]     ICD-10-CM: Y29.47 ICD-9-CM: 721.2 DDD (degenerative disc disease), thoracic     ICD-10-CM: M51.34 
ICD-9-CM: 722.51 Vitals BP Pulse Resp Height(growth percentile) Weight(growth percentile) BMI  
 136/67 90 12 5' 11\" (1.803 m) 206 lb 6.4 oz (93.6 kg) 28.79 kg/m2 OB Status Smoking Status Implant Former Smoker BMI and BSA Data  Body Mass Index Body Surface Area 28.79 kg/m 2 2.17 m 2 Preferred Pharmacy Pharmacy Name Phone [facility] [address] [phone] Your Updated Medication List  
  
   
This list is accurate as of: 1/19/18 10:26 AM.  Always use your most recent med list.  
  
  
  
  
 ALPRAZolam 2 mg tablet Commonly known as:  Aloma Gaudy TK 1 T PO BID PRF ANXIETY  
  
 azithromycin 250 mg tablet Commonly known as:  Bob Haw Take 250 mg by mouth daily. calcium citrate-vitamin D3 tablet Commonly known as:  CITRACAL WITH VITAMIN D MAXIMUM Take 2 Tabs by mouth three (3) times daily. carisoprodol 250 mg tablet Commonly known as:  SOMA  
  
 cyanocobalamin 1,000 mcg tablet Take 1,000 mcg by mouth daily. dextroamphetamine-amphetamine 30 mg tablet Commonly known as:  ADDERALL  
  
 fluconazole 150 mg tablet Commonly known as:  DIFLUCAN  
  
 MULTI VITAMIN PO Take 1 Tab by mouth two (2) times a day. ondansetron 4 mg disintegrating tablet Commonly known as:  ZOFRAN ODT Take 1 Tab by mouth every six (6) hours as needed. Indications: PREVENTION OF POST-OPERATIVE NAUSEA AND VOMITING  
  
 pantoprazole 40 mg tablet Commonly known as:  PROTONIX Take 1 Tab by mouth Before breakfast and dinner. sucralfate 1 gram tablet Commonly known as:  Cydney Omero Take 1 Tab by mouth Before breakfast, lunch, dinner and at bedtime. VITAMIN D3 1,000 unit Cap Generic drug:  cholecalciferol Take 5,000 Units by mouth daily. zolpidem 10 mg tablet Commonly known as:  AMBIEN We Performed the Following AMB POC XRAY, SPINE, LUMBOSACRAL; 2 O [97232 CPT(R)] AMB POC XRAY, SPINE; THORACIC, 2 VIEW [25707 CPT(R)] Follow-up Instructions Return for after MRI. To-Do List   
 01/26/2018   Imaging:  MRI [facility] SPINE WO CONT   
  
 04/06/2018 9:15 AM  
  Appointment with [facility] BARIATRIC DIETITIAN at [facility]IC  ([phone]) The appointment time noted includes the 15 minutes time frame for check in. Please do not arrive earlier than posted appointment time. Upon arrival report to the Conference Room located in [address] [place] [address], [address] [place] [address], [address] Referral Information Referral ID Referred By Referred To  
  
 [id number] PADMINI FERGUSON KIP III Not Available Visits Status Start Date End Date 1 New Request 1/19/18 1/19/19 If your referral has a status of pending review or denied, additional information will be sent to support the outcome of this decision. Introducing [facility]! [employer] introduces [employer] patient portal. Now you can access parts of your medical record, email your doctor's office, and request medication refills online. 1. In your internet browser, go to [email or web address] 2. Click on the First Time User? Click Here link in the Sign In box. You will see the New Member Sign Up page. 3. Enter your M[employer]a Access Code exactly as it appears below. You will not need to use this code after youve completed the sign-up process. If you do not sign up before the expiration date, you must request a new code. · [employer]a Access Code: [email or web address] Expires: 4/2/2018  9:21 AM 
 
4. Enter the last four digits of your Social Security Number (xxxx) and Date of Birth (mm/dd/yyyy) as indicated and click Submit. You will be taken to the next sign-up page. 5. Create a NealyWeart ID. This will be your [employer] login ID and cannot be changed, so think of one that is secure and easy to remember. 6. Create a [employer] password. You can change your password at any time. 7. Enter your Password Reset Question and Answer. This can be used at a later time if you forget your password. 8. Enter your e-mail address.  You will receive e-mail notification when new information is available in [employer]. 9. Click Sign Up. You can now view and download portions of your medical record. 10. Click the Download Summary menu link to download a portable copy of your medical information. If you have questions, please visit the Frequently Asked Questions section of the [employer] website. Remember, [employer]u is NOT to be used for urgent needs. For medical emergencies, dial 911. Now available from your iPhone and Android! Please provide this summary of care documentation to your next provider. Your primary care clinician is listed as Luis Miguel Ny. If you have any questions after today's visit, please call [phone].

## 2018-01-19 NOTE — TELEPHONE ENCOUNTER
Ms Yao Suazo is scheduled for her MRI on 02/03/18. She is claustrophobic and will require calming medication. She currently is prescribed Xanax so please consider another medication. Please call patient to advise. Thank you.

## 2018-01-22 RX ORDER — DIAZEPAM 10 MG/1
10 TABLET ORAL ONCE
Qty: 1 TAB | Refills: 0 | OUTPATIENT
Start: 2018-01-22 | End: 2018-01-22

## 2018-01-23 NOTE — TELEPHONE ENCOUNTER
Pharmacy on file is  [facility] [address] [place] [address] Pkwy, called patient, verified , informed we cannot phone in meds to any [facility] pharmacies, patient verbalized agreement/understanding. Per patient, ok to call into [facility] on [address] in [place]. Patient informed not to take Valium with Xanax. Patient verbalized agreement/understanding. Patient wanted to confirm her MRI appointment at [place] [address], informed patient [facility] however [facility]. Patient did verify her MRI f/u appointment with Dr. Bhakti Aparicio, also reminded patient to bring MRI disc with her to her f/u or she will not be seen at her appointment date. Patient verbalized agreement/understanding.

## 2018-01-23 NOTE — TELEPHONE ENCOUNTER
Valium phoned into pharmacy on file, spoke with Pharmacist Gerardo Williamson, obtained Litzy Alba. Patient aware. No further action required at this time.

## 2018-02-06 ENCOUNTER — OFFICE VISIT (OUTPATIENT)
Dept: ORTHOPEDIC SURGERY | Age: 33
End: 2018-02-06

## 2018-02-06 VITALS
HEIGHT: 71 IN | HEART RATE: 108 BPM | BODY MASS INDEX: 27.55 KG/M2 | DIASTOLIC BLOOD PRESSURE: 81 MMHG | RESPIRATION RATE: 16 BRPM | SYSTOLIC BLOOD PRESSURE: 118 MMHG | WEIGHT: 196.8 LBS

## 2018-02-06 DIAGNOSIS — M51.24 HNP (HERNIATED NUCLEUS PULPOSUS), THORACIC: Primary | ICD-10-CM

## 2018-02-06 DIAGNOSIS — M51.34 DDD (DEGENERATIVE DISC DISEASE), THORACIC: ICD-10-CM

## 2018-02-06 RX ORDER — ESTRADIOL 0.05 MG/D
PATCH TRANSDERMAL
COMMUNITY
Start: 2018-02-05

## 2018-02-06 RX ORDER — HYDROCODONE BITARTRATE AND ACETAMINOPHEN 5; 325 MG/1; MG/1
TABLET ORAL
COMMUNITY
Start: 2018-01-24

## 2018-02-06 NOTE — MR AVS SNAPSHOT
[address] [facility] 
 
 
 [address] 200 [facility] 
[phone] Patient: Simona Ortiz MRN: [id number] Visit Information Date & Time Provider Department Dept. Phone Encounter #  
 2/6/2018 11:20 AM Ted Bradshaw  [facility], [address] and [facility] - [place] [address]  Follow-up Instructions Return if symptoms worsen or fail to improve. Your Appointments 2/13/2018 10:00 AM  
SURGERY CONSULT with Carley Calderon MD  
[address] [facility], [address] and [facility] ONE [address]) Appt Note: SC-thoracic-patient is aware of date/time/loc and to bring MRI disk [address] [facility] [address]  
[phone]  
  
   
 [address] [facility] [address] Upcoming Health Maintenance Date Due DTaP/Tdap/Td series (1 - Tdap) 8/6/2008 PAP AKA CERVICAL CYTOLOGY 8/6/2008 Influenza Age 5 to Adult 8/1/2017 Allergies as of 2/6/2018  Review Complete On: 2/6/2018 By: Ted Bradshaw MD  
  
 Severity Noted Reaction Type Reactions Prednisone High 06/28/2017    Other (comments) Makes very extreme aggression Chantix [Varenicline]  01/10/2015    Other (comments)  
 syncope Nsaids (Non-steroidal Anti-inflammatory Drug)  06/28/2017    Other (comments) No after bariatric sx Current Immunizations  Never Reviewed No immunizations on file. Not reviewed this visit You Were Diagnosed With   
  
 Codes Comments HNP (herniated nucleus pulposus), thoracic    -  Primary ICD-10-CM: M51.24 
ICD-9-CM: 722.11   
 DDD (degenerative disc disease), thoracic     ICD-10-CM: M51.34 
ICD-9-CM: 722.51 Vitals BP Pulse Resp Height(growth percentile) Weight(growth percentile) BMI  
 118/81 (!) 108 16 5' 11\" (1.803 m) 196 lb 12.8 oz (89.3 kg) 27.45 kg/m2 OB Status Smoking Status Implant Former Smoker  Vitals History BMI and BSA Data Body Mass Index Body Surface Area  
 27.45 kg/m 2 2.12 m 2 Preferred Pharmacy Pharmacy Name Phone [facility], [address] [place] [address] [phone] Your Updated Medication List  
  
   
This list is accurate as of: 2/6/18 12:19 PM.  Always use your most recent med list.  
  
  
  
  
 ALPRAZolam 2 mg tablet Commonly known as:  Iraheta Mettle TK 1 T PO BID PRF ANXIETY  
  
 calcium citrate-vitamin D3 tablet Commonly known as:  CITRACAL WITH VITAMIN D MAXIMUM Take 2 Tabs by mouth three (3) times daily. carisoprodol 250 mg tablet Commonly known as:  SOMA  
  
 cyanocobalamin 1,000 mcg tablet Take 1,000 mcg by mouth daily. dextroamphetamine-amphetamine 30 mg tablet Commonly known as:  ADDERALL  
two (2) times a day. estradiol 0.05 mg/24 hr  
Commonly known as:  CLIMARA  
  
 fluconazole 150 mg tablet Commonly known as:  DIFLUCAN  
  
 HYDROcodone-acetaminophen 5-325 mg per tablet Commonly known as:  [place] Take 1 Tab by mouth two (2) times a day. ondansetron 4 mg disintegrating tablet Commonly known as:  ZOFRAN ODT Take 1 Tab by mouth every six (6) hours as needed. Indications: PREVENTION OF POST-OPERATIVE NAUSEA AND VOMITING  
  
 pantoprazole 40 mg tablet Commonly known as:  PROTONIX Take 1 Tab by mouth Before breakfast and dinner. sucralfate 1 gram tablet Commonly known as:  Carmelia Gavel Take 1 Tab by mouth Before breakfast, lunch, dinner and at bedtime. VITAMIN D3 1,000 unit Cap Generic drug:  cholecalciferol Take 5,000 Units by mouth daily. zolpidem 10 mg tablet Commonly known as:  AMBIEN We Performed the Following REFERRAL TO SPINE SURGERY [OKG928 Custom] Comments:  
 Appointment: Deja Funk 13christoph at 1000.  
Arrive 30 minutes early and take your MRI disk with you to your appointment or you may be rescheduled. Follow-up Instructions Return if symptoms worsen or fail to improve. To-Do List   
 04/06/2018 9:15 AM  
  Appointment with [facility] BARIATRIC DIETITIAN at [facility] BARIATRIC  ([phone]) The appointment time noted includes the 15 minutes time frame for check in. Please do not arrive earlier than posted appointment time. Upon arrival report to the Conference Room located in [address] [place] [address], [address] [place] [address], [employer], [address] Referral Information Referral ID Referred By Referred To  
  
 [id number] Arlene Lechuga MD [address] Phone: [phone] Fax: [phone] Visits Status Start Date End Date 1 New Request 2/6/18 2/6/19 If your referral has a status of pending review or denied, additional information will be sent to support the outcome of this decision. Introducing [facility]! [address] [place] [address] introduces [employer] patient portal. Now you can access parts of your medical record, email your doctor's office, and request medication refills online. 1. In your internet browser, go to [email or web address] 2. Click on the First Time User? Click Here link in the Sign In box. You will see the New Member Sign Up page. 3. Enter your [employer] Access Code exactly as it appears below. You will not need to use this code after youve completed the sign-up process. If you do not sign up before the expiration date, you must request a new code. · [employer] Access Code: [email or web address] Expires: 4/2/2018  9:21 AM 
 
4. Enter the last four digits of your Social Security Number (xxxx) and Date of Birth (mm/dd/yyyy) as indicated and click Submit. You will be taken to the next sign-up page. 5. Create a [employer] ID.  This will be your [employer] login ID and cannot be changed, so think of one that is secure and easy to remember. 6. Create a [employer] password. You can change your password at any time. 7. Enter your Password Reset Question and Answer. This can be used at a later time if you forget your password. 8. Enter your e-mail address. You will receive e-mail notification when new information is available in [address] 9. Click Sign Up. You can now view and download portions of your medical record. 10. Click the Download Summary menu link to download a portable copy of your medical information. If you have questions, please visit the Frequently Asked Questions section of the [employer] website. Remember, [employer] is NOT to be used for urgent needs. For medical emergencies, dial 911. Now available from your iPhone and Android! Please provide this summary of care documentation to your next provider. Your primary care clinician is listed as Orgger Crew. If you have any questions after today's visit, please call [phone].

## 2018-02-13 ENCOUNTER — OFFICE VISIT (OUTPATIENT)
Dept: ORTHOPEDIC SURGERY | Age: 33
End: 2018-02-13

## 2018-02-13 VITALS
SYSTOLIC BLOOD PRESSURE: 111 MMHG | TEMPERATURE: 98.2 F | BODY MASS INDEX: 26.77 KG/M2 | RESPIRATION RATE: 20 BRPM | WEIGHT: 191.2 LBS | DIASTOLIC BLOOD PRESSURE: 60 MMHG | HEIGHT: 71 IN | HEART RATE: 88 BPM | OXYGEN SATURATION: 97 %

## 2018-02-13 DIAGNOSIS — M51.24 PROTRUSION OF THORACIC INTERVERTEBRAL DISC: ICD-10-CM

## 2018-02-13 DIAGNOSIS — M54.14 THORACIC NEURITIS: Primary | ICD-10-CM

## 2018-02-13 RX ORDER — VENLAFAXINE HYDROCHLORIDE 225 MG/1
TABLET, EXTENDED RELEASE ORAL
COMMUNITY
Start: 2018-02-06

## 2018-02-13 NOTE — MR AVS SNAPSHOT
303 [facility] [address] [facility] [address] 
[phone] Patient: Bessie Horton MRN: [id number] KI Visit Information Date & Time Provider Department Dept. Phone Encounter #  
 2018 10:00 AM Ramona Nowak MD 4 [facility], [address] and [facility] [phone] [id number] Follow-up Instructions Return for may f/u with NP at [facility] office. per dr Grisel Owens. Follow-up and Disposition History Upcoming Health Maintenance Date Due DTaP/Tdap/Td series (1 - Tdap) 2008 PAP AKA CERVICAL CYTOLOGY 2008 Influenza Age 5 to Adult 2017 Allergies as of 2018  Review Complete On: 2018 By: Ramona Nowak MD  
  
 Severity Noted Reaction Type Reactions Prednisone High 2017    Other (comments) Makes very extreme aggression Chantix [Varenicline]  01/10/2015    Other (comments)  
 syncope Nsaids (Non-steroidal Anti-inflammatory Drug)  2017    Other (comments) No after bariatric sx Lidocaine Low 2018    Itching Patch caused itching around side Current Immunizations  Never Reviewed No immunizations on file. Not reviewed this visit You Were Diagnosed With   
  
 Codes Comments Thoracic neuritis    -  Primary ICD-10-CM: M54.14 
ICD-9-CM: 724.4 Protrusion of thoracic intervertebral disc     ICD-10-CM: M51.24 
ICD-9-CM: 722.11 Vitals BP Pulse Temp Resp Height(growth percentile) Weight(growth percentile) 111/60 88 98.2 °F (36.8 °C) (Oral) 20 5' 11\" (1.803 m) 191 lb 3.2 oz (86.7 kg) SpO2 BMI OB Status Smoking Status 97% 26.67 kg/m2 Implant Former Smoker BMI and BSA Data Body Mass Index Body Surface Area  
 26.67 kg/m 2 2.08 m 2 Preferred Pharmacy Pharmacy Name Phone [facility] [address] [phone] Your Updated Medication List  
  
 This list is accurate as of: 2/13/18 11:01 AM.  Always use your most recent med list.  
  
  
  
  
 ALPRAZolam 2 mg tablet Commonly known as:  Olene Shoe TK 1 T PO BID PRF ANXIETY  
  
 calcium citrate-vitamin D3 tablet Commonly known as:  CITRACAL WITH VITAMIN D MAXIMUM Take 2 Tabs by mouth three (3) times daily. carisoprodol 250 mg tablet Commonly known as:  SOMA  
  
 cyanocobalamin 1,000 mcg tablet Take 1,000 mcg by mouth daily. dextroamphetamine-amphetamine 30 mg tablet Commonly known as:  ADDERALL  
two (2) times a day. estradiol 0.05 mg/24 hr  
Commonly known as:  CLIMARA  
  
 fluconazole 150 mg tablet Commonly known as:  DIFLUCAN  
  
 HYDROcodone-acetaminophen 5-325 mg per tablet Commonly known as:  [place] Take 1 Tab by mouth two (2) times a day. ondansetron 4 mg disintegrating tablet Commonly known as:  ZOFRAN ODT Take 1 Tab by mouth every six (6) hours as needed. Indications: PREVENTION OF POST-OPERATIVE NAUSEA AND VOMITING  
  
 pantoprazole 40 mg tablet Commonly known as:  PROTONIX Take 1 Tab by mouth Before breakfast and dinner. sucralfate 1 gram tablet Commonly known as:  Kateryna Massing Take 1 Tab by mouth Before breakfast, lunch, dinner and at bedtime. venlafaxine 225 mg Tr24 VITAMIN D3 1,000 unit Cap Generic drug:  cholecalciferol Take 5,000 Units by mouth daily. zolpidem 10 mg tablet Commonly known as:  AMBIEN We Performed the Following REFERRAL TO PHYSICAL THERAPY [YNH12 Custom] TENS UNIT [ZEJ117 Custom] Comments: With supplies Follow-up Instructions Return for may f/u with NP at [facility] office. per dr Jet Estrada. To-Do List   
 04/06/2018 9:15 AM  
  Appointment with [facility] BARIATRIC DIETITIAN at [facility] BARIATRIC  ([phone]) The appointment time noted includes the 15 minutes time frame for check in.   Please do not arrive earlier than posted appointment time.  Upon arrival report to the Conference Room located in [address] [place] [address], [facility], [place], [address] Referral Information Referral ID Referred By Referred To  
  
 [id number] Jet MARKS Not Available Visits Status Start Date End Date 1 New Request 2/13/18 2/13/19 If your referral has a status of pending review or denied, additional information will be sent to support the outcome of this decision. Introducing [facility]! Gaudencio Laurent introduces [employer] patient portal. Now you can access parts of your medical record, email your doctor's office, and request medication refills online. 1. In your internet browser, go to [email or web address] 2. Click on the First Time User? Click Here link in the Sign In box. You will see the New Member Sign Up page. 3. Enter your [employer] Access Code exactly as it appears below. You will not need to use this code after youve completed the sign-up process. If you do not sign up before the expiration date, you must request a new code. · [employer] Access Code: [email or web address] Expires: 4/2/2018  9:21 AM 
 
4. Enter the last four digits of your Social Security Number (xxxx) and Date of Birth (mm/dd/yyyy) as indicated and click Submit. You will be taken to the next sign-up page. 5. Create a [employer] ID. This will be your [employer] login ID and cannot be changed, so think of one that is secure and easy to remember. 6. Create a [employer] password. You can change your password at any time. 7. Enter your Password Reset Question and Answer. This can be used at a later time if you forget your password. 8. Enter your e-mail address. You will receive e-mail notification when new information is available in [address] 9. Click Sign Up. You can now view and download portions of your medical record.  
10. Click the Download Summary menu link to download a portable copy of your medical information. If you have questions, please visit the Frequently Asked Questions section of the [employer] website. Remember, [employer] is NOT to be used for urgent needs. For medical emergencies, dial 911. Now available from your iPhone and Android! Please provide this summary of care documentation to your next provider. Your primary care clinician is listed as Uma Mcnair. If you have any questions after today's visit, please call [phone].

## 2018-02-13 NOTE — PROGRESS NOTES
[address]  [address]  [address]  Phone: [phone]  Fax: [phone]  INITIAL CONSULTATION  Patient: Rachana Dickson                MRN: [id number]       SSN: [id number]  YOB: 1985        AGE: 29 y.o. SEX: female  Body mass index is 26.67 kg/(m^2). PCP: Mia Phelps NP  02/13/18    Chief Complaint   Patient presents with    Back Pain     back pain eval    Referral / Consult    Neck Pain    Shoulder Pain     left shoulder         HISTORY OF PRESENT ILLNESS, RADIOGRAPHS, and PLAN:         HISTORY OF PRESENT ILLNESS:  Ms. Ivy Padilla is seen today at the request of Isa Garcia N.P., and Dr. Saleem Eldridge. Ms. Ivy Padilla is a 53-year-old female with a history of chronic thoracolumbar back pain for a decade. The pain extends from her bra line to her lumbosacral junction. It has been going on, again, for a decade. She is without real radicular pain, though she occasionally gets some left-sided sciatica. She denies bowel or bladder dysfunction, though sometimes she feels she has to rush getting to the bathroom. She has no fever, chills, or night sweats. She recently underwent gastric bypass surgery this past summer in July and has already lost 205 pounds. Her weight is now down to 191 pounds. PHYSICAL EXAMINATION:  Her physical exam demonstrates normal sensation, normal strength, and normal reflexes. RADIOGRAPHS:  She has had an MRI done of her thoracic spine. There is a T4-5 disc herniation on the left at the lateral aspect causing some left lateral cord contact and T6-7 on the right causing some right lateral cord contact. There is no true cord compression. The central canal is wide and patent throughout. The foramina are patent. ASSESSMENT/PLAN: We have a patient who has had a decade of thoracolumbar back pain.   She does have two upper thoracic disc herniations really higher than the level of her pain.  She has no neurology and no long tract signs. She is deconditioned. She has very recently lost 200 pounds after gastric bypass surgery. I have explained to her that I do not believe she is a surgical candidate for these disc herniations. I cannot say they are causing any neurology, nor can I say with any certainty they are pain generators for her. I believe she needs conditioning, core strengthening, and the like. We will get her involved in physical therapy, use of a TENS machine, modalities, and she really needs total rehabilitation given her dramatic weight loss, which has really probably robbed her of dramatic muscle mass and reconditioning. We will continue to follow her, but again, I do not see anything I would consider surgical intervention necessary for her. cc: Nalini Dillard N.P. Past Medical History:   Diagnosis Date    Anxiety     Ill-defined condition     HPV    Plantar fasciitis     PUD (peptic ulcer disease)     Spondylosis     UTI (urinary tract infection)        Family History   Problem Relation Age of Onset    No Known Problems Mother     Diabetes Father     Hypertension Father        Current Outpatient Prescriptions   Medication Sig Dispense Refill    venlafaxine 225 mg tr24       HYDROcodone-acetaminophen (NORCO) 5-325 mg per tablet       estradiol (CLIMARA) 0.05 mg/24 hr       carisoprodol (SOMA) 250 mg tablet       dextroamphetamine-amphetamine (ADDERALL) 30 mg tablet two (2) times a day.  fluconazole (DIFLUCAN) 150 mg tablet       calcium citrate-vitamin D3 (CITRACAL WITH VITAMIN D MAXIMUM) tablet Take 2 Tabs by mouth three (3) times daily.  pantoprazole (PROTONIX) 40 mg tablet Take 1 Tab by mouth Before breakfast and dinner. 60 Tab 2    sucralfate (CARAFATE) 1 gram tablet Take 1 Tab by mouth Before breakfast, lunch, dinner and at bedtime.  112 Tab 2    zolpidem (AMBIEN) 10 mg tablet       MULTIVIT-MINERALS/FERROUS FUM (MULTI VITAMIN PO) Take 1 Tab by mouth two (2) times a day.  cyanocobalamin 1,000 mcg tablet Take 1,000 mcg by mouth daily.  ondansetron (ZOFRAN ODT) 4 mg disintegrating tablet Take 1 Tab by mouth every six (6) hours as needed. Indications: PREVENTION OF POST-OPERATIVE NAUSEA AND VOMITING 30 Tab 0    ALPRAZolam (XANAX) 2 mg tablet TK 1 T PO BID PRF ANXIETY  2    cholecalciferol (VITAMIN D3) 1,000 unit cap Take 5,000 Units by mouth daily. Allergies   Allergen Reactions    Prednisone Other (comments)     Makes very extreme aggression    Chantix [Varenicline] Other (comments)     syncope    Nsaids (Non-Steroidal Anti-Inflammatory Drug) Other (comments)     No after bariatric sx    Lidocaine Itching     Patch caused itching around side       Past Surgical History:   Procedure Laterality Date    HX APPENDECTOMY      HX  SECTION      HX GASTRIC BYPASS  2017    HX LIPOMA RESECTION      HX OTHER SURGICAL      plantar faciitis  repair    HX SHOULDER ARTHROSCOPY      cartilage repair    HX TONSILLECTOMY         Past Medical History:   Diagnosis Date    Anxiety     Ill-defined condition     HPV    Plantar fasciitis     PUD (peptic ulcer disease)     Spondylosis     UTI (urinary tract infection)        Social History     Social History    Marital status: UNKNOWN     Spouse name: N/A    Number of children: N/A    Years of education: N/A     Occupational History    Not on file. Social History Main Topics    Smoking status: Former Smoker     Packs/day: 0.25     Types: Cigarettes     Quit date: 2017    Smokeless tobacco: Former User    Alcohol use No    Drug use: No    Sexual activity: Yes     Partners: Male     Birth control/ protection: None     Other Topics Concern    Not on file     Social History Narrative           REVIEW OF SYSTEMS:   CONSTITUTIONAL SYMPTOMS:  Negative. EYES:  Negative. EARS, NOSE, THROAT AND MOUTH:  Negative.    CARDIOVASCULAR: Negative. RESPIRATORY:  Negative. GENITOURINARY: Per HPI. GASTROINTESTINAL:  Per HPI. INTEGUMENTARY (SKIN AND/OR BREAST):  Negative. MUSCULOSKELETAL: Per HPI.   ENDOCRINE/RHEUMATOLOGIC:  Negative. NEUROLOGICAL:  Per HPI. HEMATOLOGIC/LYMPHATIC:  Negative. ALLERGIC/IMMUNOLOGIC:  Negative. PSYCHIATRIC:  Negative. PHYSICAL EXAMINATION:   Visit Vitals    /60    Pulse 88    Temp 98.2 °F (36.8 °C) (Oral)    Resp 20    Ht 5' 11\" (1.803 m)    Wt 191 lb 3.2 oz (86.7 kg)    SpO2 97%    BMI 26.67 kg/m2    PAIN SCALE: 5/10    CONSTITUTIONAL: The patient is in no apparent distress and is alert and oriented x 3. HEENT: Normocephalic. Hearing grossly intact. NECK: Supple and symmetric. no tenderness, or masses were felt. RESPIRATORY: No labored breathing. CARDIOVASCULAR: The carotid pulses were normal. Peripheral pulses were 2+. CHEST: Normal AP diameter and normal contour without any kyphoscoliosis. LYMPHATIC: No lymphadenopathy was appreciated in the neck, axillae or groin. SKIN:  Negative for scars, rashes, lesions, or ulcers on the right upper, right lower, left upper, left lower and trunk. NEUROLOGICAL: Alert and oriented x 3. Unsteady gait. Ambulation without assistive device. FWB. EXTREMITIES:  See musculoskeletal.  MUSCULOSKELETAL:   Head and Neck:  Negative for misalignment, asymmetry, crepitation, defects, tenderness masses or effusions.  Left Upper Extremity: Inspection, percussion and palpation preformed. Mars sign is negative.  Right Upper Extremity: Inspection, percussion and palpation preformed. Mars sign is negative.  Spine, Ribs and Pelvis: Mid back and low back pain. Inspection, percussion and palpation preformed. Negative for misalignment, asymmetry, crepitation, defects, tenderness masses or effusions.  Left Lower Extremity: Hip pain. Radiating muscle spasms. Inspection, percussion and palpation preformed.    Negative straight leg raise.   Right Lower Extremity: Inspection, percussion and palpation preformed. Negative straight leg raise. SPINE EXAM:     Lumbar spine: No rash, ecchymosis, or gross obliquity. No focal atrophy is noted. ASSESSMENT    ICD-10-CM ICD-9-CM    1. Thoracic neuritis M54.14 724.4 TENS UNIT      REFERRAL TO PHYSICAL THERAPY   2. Protrusion of thoracic intervertebral disc M51.24 722.11 TENS UNIT      REFERRAL TO PHYSICAL THERAPY       Written by Adrián Das, as dictated by Leigha Saravia MD.    I, Dr. Leigha Saravia MD, confirm that all documentation is accurate.

## 2018-02-14 ENCOUNTER — TELEPHONE (OUTPATIENT)
Dept: ORTHOPEDIC SURGERY | Age: 33
End: 2018-02-14

## 2018-02-22 DIAGNOSIS — M54.50 LOW BACK PAIN WITHOUT SCIATICA, UNSPECIFIED BACK PAIN LATERALITY, UNSPECIFIED CHRONICITY: ICD-10-CM

## 2018-02-22 DIAGNOSIS — M51.36 DDD (DEGENERATIVE DISC DISEASE), LUMBAR: ICD-10-CM

## 2018-02-22 DIAGNOSIS — M54.6 THORACIC BACK PAIN, UNSPECIFIED BACK PAIN LATERALITY, UNSPECIFIED CHRONICITY: ICD-10-CM

## 2018-02-22 DIAGNOSIS — M47.814 THORACIC SPONDYLARTHRITIS: ICD-10-CM

## 2018-02-22 DIAGNOSIS — M43.06 LUMBAR SPONDYLOLYSIS: ICD-10-CM

## 2018-02-22 DIAGNOSIS — M51.34 DDD (DEGENERATIVE DISC DISEASE), THORACIC: ICD-10-CM

## 2018-04-06 ENCOUNTER — DOCUMENTATION ONLY (OUTPATIENT)
Dept: BARIATRICS/WEIGHT MGMT | Age: 33
End: 2018-04-06

## 2018-06-28 NOTE — PROGRESS NOTES
Progress Note Patient: Kristyn Grove               Sex: female          DOA: 9/15/2017 YOB: 1985      Age:  29 y.o.        LOS:  LOS: 1 day CHIEF COMPLAINT: 
 
Subjective:  
 
Pt continues to have fever and is tachycardic. Abdominal pain is improved and nausea and vomiting is currently resolved and she denies CP or SOB. Objective:  
  
Visit Vitals  BP (!) 117/95  Pulse (!) 126  Temp (!) 102.7 °F (39.3 °C)  Resp 29  
 Ht 5' 11\" (1.803 m)  Wt 263 lb 4.8 oz (119.4 kg)  SpO2 94%  Breastfeeding No  
 BMI 36.72 kg/m2 Physical Exam: 
GEN: Obese white female, awake in no distress CVS: Normal S1S2, RRR 
RESP: CTAB 
ABD: Soft, obese, mild tenderness, no rebound or guarding, +BS 
EXT: No C/C/E 
NEURO: CN 2 - 12 intact with no focal deficits noted. Lab/Data Reviewed: 
CMP:  
Lab Results Component Value Date/Time  09/16/2017 01:30 AM  
 K 3.6 09/16/2017 01:30 AM  
  09/16/2017 01:30 AM  
 CO2 27 09/16/2017 01:30 AM  
 AGAP 11 09/16/2017 01:30 AM  
 GLU 88 09/16/2017 01:30 AM  
 BUN 10 09/16/2017 01:30 AM  
 CREA 0.54 (L) 09/16/2017 01:30 AM  
 GFRAA >60 09/16/2017 01:30 AM  
 GFRNA >60 09/16/2017 01:30 AM  
 CA 8.0 (L) 09/16/2017 01:30 AM  
 MG 1.7 09/15/2017 07:55 PM  
 ALB 2.6 (L) 09/16/2017 01:30 AM  
 TP 5.5 (L) 09/16/2017 01:30 AM  
 GLOB 2.9 09/16/2017 01:30 AM  
 AGRAT 0.9 09/16/2017 01:30 AM  
 SGOT 50 (H) 09/16/2017 01:30 AM  
 ALT 72 (H) 09/16/2017 01:30 AM  
 
CBC:  
Lab Results Component Value Date/Time WBC 4.5 (L) 09/16/2017 01:30 AM  
 HGB 13.6 09/16/2017 01:30 AM  
 HCT 41.2 09/16/2017 01:30 AM  
  (L) 09/16/2017 01:30 AM  
 
 
 
 
Assessment/Plan Principal Problem: 
  Abdominal pain (9/15/2017) Active Problems: Hypokalemia (9/15/2017) Vomiting (9/15/2017) Elevated LFTs (9/15/2017) Tylenol overdose (9/15/2017) Syncope and collapse (9/15/2017) Myalgia (9/16/2017) Syncope (9/16/2017) Hyperbilirubinemia (9/16/2017) History of gastric bypass (9/16/2017) Plan: 
Sepsis with UTI. Pt with SIRS and tachycardia and has a fever + UTI = Sepsis. Vancomycin and Ceftriaxone started today with pharmacy consulted to dose Vancomycin and blood and urine cultures pending, though pt with no leukocytosis. Lactic acid ordered. Tylenol as needed for pain as Tylenol level unremarkable. Tachycardia - Stat CTA chest ordered to r/o PE Abdominal pain - ? Cause as CT Abdomen and Pelvis done on admission unremarkable. Pregnancy testy was negative. Surgeon Dr. Rita Centeno following and I discussed with him. Continue analgesics as needed for pain. Hypokalemia - Repleted Nausea and vomiting. Improved and continue Zofran as needed S/P gastric bypass for weight loss - Surgeon Dr. Rita Centeno following GI prophylaxis - Protonix 40 mg IV BID started today (ordered) DVT prophylaxis - Heparin Discussed with nurse Lena Tijerina DO, MPH Internal Medicine home w/ home PT

## 2018-07-02 ENCOUNTER — DOCUMENTATION ONLY (OUTPATIENT)
Dept: SURGERY | Age: 33
End: 2018-07-02

## 2018-07-02 NOTE — PROGRESS NOTES
Per [facility] requirements;  E-mail and letter sent for follow up appointment. [employer] [facility] Chandrakant Loss Lianne Rai 89      Dear Patient,  Your health is our main concern. It is important for your health to have follow-up lab work and to see you surgeon at 3 months, 6 months and annually after your weight loss surgery. Additionally, the Department of bariatric Surgery at our hospital is a member of the [employer] [address] Surgical Quality Improvement Program ([facility] NSQIP). As a participant in this program, we gather information on the outcomes of our patients after surgery. Please call the office for a follow up appointment at [phone] with SOLITARIO StanleyDeonnaBC. If you have moved out of the area or have changed surgeons please call us and let us know the name of your doctor. Your health and feedback are important to us. We greatly appreciate your response.        Thank you,  [employer] [facility]andrakant Loss [address] [facility]

## 2018-07-09 ENCOUNTER — ED HISTORICAL/CONVERTED ENCOUNTER (OUTPATIENT)
Dept: OTHER | Age: 33
End: 2018-07-09

## 2018-11-26 ENCOUNTER — ED HISTORICAL/CONVERTED ENCOUNTER (OUTPATIENT)
Dept: OTHER | Age: 33
End: 2018-11-26

## 2020-04-05 ENCOUNTER — ED HISTORICAL/CONVERTED ENCOUNTER (OUTPATIENT)
Dept: OTHER | Age: 35
End: 2020-04-05

## 2020-04-10 ENCOUNTER — ED HISTORICAL/CONVERTED ENCOUNTER (OUTPATIENT)
Dept: OTHER | Age: 35
End: 2020-04-10

## 2021-08-03 PROBLEM — R55 SYNCOPE: Status: RESOLVED | Noted: 2017-09-16 | Resolved: 2021-08-03

## 2022-03-18 PROBLEM — E80.6 HYPERBILIRUBINEMIA: Status: ACTIVE | Noted: 2017-09-16

## 2022-03-18 PROBLEM — M54.14 THORACIC NEURITIS: Status: ACTIVE | Noted: 2018-02-13

## 2022-03-19 PROBLEM — E66.01 MORBID OBESITY (HCC): Status: ACTIVE | Noted: 2017-07-11

## 2022-03-19 PROBLEM — R79.89 ELEVATED LFTS: Status: ACTIVE | Noted: 2017-09-15

## 2022-03-19 PROBLEM — E87.6 HYPOKALEMIA: Status: ACTIVE | Noted: 2017-09-15

## 2022-03-19 PROBLEM — R10.9 ABDOMINAL PAIN: Status: ACTIVE | Noted: 2017-09-15

## 2022-03-19 PROBLEM — J18.9 PNEUMONIA: Status: ACTIVE | Noted: 2017-09-18

## 2022-03-19 PROBLEM — R11.10 VOMITING: Status: ACTIVE | Noted: 2017-09-15

## 2022-03-19 PROBLEM — T39.1X1A TYLENOL OVERDOSE: Status: ACTIVE | Noted: 2017-09-15

## 2022-03-19 PROBLEM — Z98.84 HISTORY OF GASTRIC BYPASS: Status: ACTIVE | Noted: 2017-09-16

## 2022-03-19 PROBLEM — M79.10 MYALGIA: Status: ACTIVE | Noted: 2017-09-16

## 2022-03-19 PROBLEM — R55 SYNCOPE AND COLLAPSE: Status: ACTIVE | Noted: 2017-09-15

## 2022-03-20 PROBLEM — A41.9 SEPSIS SECONDARY TO UTI (HCC): Status: ACTIVE | Noted: 2017-09-18

## 2022-03-20 PROBLEM — N39.0 SEPSIS SECONDARY TO UTI (HCC): Status: ACTIVE | Noted: 2017-09-18

## 2022-03-20 PROBLEM — M51.24 PROTRUSION OF THORACIC INTERVERTEBRAL DISC: Status: ACTIVE | Noted: 2018-02-13

## 2022-03-20 PROBLEM — B37.81 ESOPHAGEAL CANDIDIASIS (HCC): Status: ACTIVE | Noted: 2017-09-18

## 2023-02-10 NOTE — CDMP QUERY
Patient's FSGs are controlled on current medication regimen.  Last A1c reviewed-   Lab Results   Component Value Date    HGBA1C 7.0 (H) 12/06/2022     Most recent fingerstick glucose reviewed-   Recent Labs   Lab 02/10/23  0358 02/10/23  0359 02/10/23  0442 02/10/23  0752   POCTGLUCOSE 55* 66* 91 55*     Current correctional scale  none    Antihyperglycemics (From admission, onward)    Start     Stop Route Frequency Ordered    02/09/23 2158  insulin aspart U-100 pen 0-5 Units         -- SubQ Before meals & nightly PRN 02/09/23 2059        Hold Oral hypoglycemics while patient is in the hospital.   This patient has been diagnosed with Sepsis. Please document in the progress notes the clinical indicators that support this diagnosis or state that the diagnosis has been ruled out. Current Documentation:  
 
Sepsis indicators include:  
Documented Source of suspected or possible infection in addition to 2 of more of the following: 
Temperature >38C or <36C Heart Rate >90/min Respiratory Rate >20/min or PaCO2 <32 mm Hg WBC >12,000/mm3 or <4000/mm3 or >10% immature bands =>Other Explanation of clinical findings =>Unable to Determine (no explanation of clinical findings) The medical record reflects the following: 
 
Risk: 28 yo with PMH  Gastric bypass, UTI, Clinical Indicators: Admission labs include: 
Lactic acid  0.9   WBC  5.3    RR 19 Treatment: Vancomycin, Rocephin, IV fluids, Please clarify and document your clinical opinion in the progress notes and discharge summary including the definitive and/or presumptive diagnosis, (suspected or probable), related to the above clinical findings. Please include clinical findings supporting your diagnosis. If you DECLINE this query or would like to communicate with [facility], please utilize the \"[facility] message box\" at the TOP of the Progress Note on the right. Thank you, 
Jana Sandy RN  [facility]  [phone]

## 2023-02-14 NOTE — PROGRESS NOTES
Called and spoke directly w/ attending in regards to pt request for Ambien to assist w/ sleep.  MD ordered Ambien 5mg prn oral for sleep FITO

## 2023-05-14 RX ORDER — ALPRAZOLAM 2 MG/1
TABLET ORAL
COMMUNITY
Start: 2017-05-22

## 2023-05-14 RX ORDER — DEXTROAMPHETAMINE SACCHARATE, AMPHETAMINE ASPARTATE, DEXTROAMPHETAMINE SULFATE AND AMPHETAMINE SULFATE 7.5; 7.5; 7.5; 7.5 MG/1; MG/1; MG/1; MG/1
TABLET ORAL 2 TIMES DAILY
COMMUNITY
Start: 2018-01-09

## 2023-05-14 RX ORDER — FLUCONAZOLE 150 MG/1
TABLET ORAL
COMMUNITY
Start: 2018-01-16

## 2023-05-14 RX ORDER — CARISOPRODOL 250 MG/1
TABLET ORAL
COMMUNITY
Start: 2017-12-29

## 2023-05-14 RX ORDER — HYDROCODONE BITARTRATE AND ACETAMINOPHEN 5; 325 MG/1; MG/1
TABLET ORAL
COMMUNITY
Start: 2018-01-24

## 2023-05-14 RX ORDER — PANTOPRAZOLE SODIUM 40 MG/1
40 TABLET, DELAYED RELEASE ORAL
COMMUNITY
Start: 2017-09-18

## 2023-05-14 RX ORDER — VENLAFAXINE HYDROCHLORIDE 225 MG/1
TABLET, EXTENDED RELEASE ORAL
COMMUNITY
Start: 2018-02-06

## 2023-05-14 RX ORDER — ZOLPIDEM TARTRATE 10 MG/1
TABLET ORAL
COMMUNITY
Start: 2017-07-10

## 2023-05-14 RX ORDER — SUCRALFATE 1 G/1
1 TABLET ORAL
COMMUNITY
Start: 2017-09-18

## 2023-05-14 RX ORDER — ESTRADIOL 0.05 MG/D
PATCH TRANSDERMAL
COMMUNITY
Start: 2018-02-05

## 2023-05-14 RX ORDER — ONDANSETRON 4 MG/1
4 TABLET, ORALLY DISINTEGRATING ORAL EVERY 6 HOURS PRN
COMMUNITY
Start: 2017-07-12

## 2023-05-14 RX ORDER — THIAMINE HCL 100 MG
2 TABLET ORAL 3 TIMES DAILY
COMMUNITY

## 2024-01-11 ENCOUNTER — OFFICE VISIT (OUTPATIENT)
Age: 39
End: 2024-01-11
Payer: COMMERCIAL

## 2024-01-11 VITALS
BODY MASS INDEX: 40.25 KG/M2 | OXYGEN SATURATION: 98 % | HEIGHT: 71 IN | RESPIRATION RATE: 16 BRPM | HEART RATE: 104 BPM | SYSTOLIC BLOOD PRESSURE: 135 MMHG | DIASTOLIC BLOOD PRESSURE: 83 MMHG | TEMPERATURE: 98.1 F | WEIGHT: 287.5 LBS

## 2024-01-11 DIAGNOSIS — E66.01 MORBID OBESITY WITH BMI OF 40.0-44.9, ADULT (HCC): ICD-10-CM

## 2024-01-11 DIAGNOSIS — Z98.84 HISTORY OF GASTRIC BYPASS: ICD-10-CM

## 2024-01-11 DIAGNOSIS — Z98.84 STATUS POST BARIATRIC SURGERY: Primary | ICD-10-CM

## 2024-01-11 DIAGNOSIS — R63.5 WEIGHT GAIN FOLLOWING GASTRIC BYPASS SURGERY: ICD-10-CM

## 2024-01-11 DIAGNOSIS — Z98.84 WEIGHT GAIN FOLLOWING GASTRIC BYPASS SURGERY: ICD-10-CM

## 2024-01-11 DIAGNOSIS — F17.210 CIGARETTE SMOKER: ICD-10-CM

## 2024-01-11 DIAGNOSIS — Z98.84 STATUS POST BARIATRIC SURGERY: ICD-10-CM

## 2024-01-11 PROCEDURE — 99203 OFFICE O/P NEW LOW 30 MIN: CPT | Performed by: SURGERY

## 2024-01-11 RX ORDER — PHENTERMINE HYDROCHLORIDE 37.5 MG/1
37.5 TABLET ORAL DAILY
COMMUNITY
Start: 2023-12-12

## 2024-01-11 RX ORDER — TOPIRAMATE 50 MG/1
TABLET, FILM COATED ORAL
COMMUNITY
Start: 2023-12-12

## 2024-01-11 RX ORDER — AMLODIPINE BESYLATE 2.5 MG/1
TABLET ORAL
COMMUNITY
Start: 2023-12-12

## 2024-01-11 RX ORDER — ACETAMINOPHEN 500 MG
500 TABLET ORAL 3 TIMES DAILY
COMMUNITY

## 2024-01-11 ASSESSMENT — PATIENT HEALTH QUESTIONNAIRE - PHQ9
2. FEELING DOWN, DEPRESSED OR HOPELESS: 0
SUM OF ALL RESPONSES TO PHQ QUESTIONS 1-9: 0
1. LITTLE INTEREST OR PLEASURE IN DOING THINGS: 0
SUM OF ALL RESPONSES TO PHQ QUESTIONS 1-9: 0
SUM OF ALL RESPONSES TO PHQ9 QUESTIONS 1 & 2: 0

## 2024-01-11 NOTE — PROGRESS NOTES
Identified pt with two pt identifiers (name and ). Reviewed chart in preparation for visit and have obtained necessary documentation.    Xena Loaiza is a 38 y.o. female  Chief Complaint   Patient presents with    New Patient    Bariatric, Initial Visit     /83 (Site: Left Upper Arm, Position: Sitting, Cuff Size: Large Adult)   Pulse (!) 104   Temp 98.1 °F (36.7 °C) (Temporal)   Resp 16   Ht 1.803 m (5' 11\")   Wt 130.4 kg (287 lb 8 oz)   SpO2 98%   BMI 40.10 kg/m²     1. Have you been to the ER, urgent care clinic since your last visit?  Hospitalized since your last visit?no    2. Have you seen or consulted any other health care providers outside of the [facility] since your last visit?  Include any pap smears or colon screening. yes - PCP

## 2024-01-11 NOTE — PROGRESS NOTES
[facility]  Dr. Marisol Tucker  [address], [place] [address]    Bariatric Surgery Follow Up    Patient Name: Xena Loaiza (38 y.o., female)    PCP: Elza Arrington APRN - NP     Subjective:      Xena Loaiza is a 38 y.o. female, who presents for follow up after laparoscopic Marco en Y gastric bypass in 2017 by Dr. Floyd Wagner.  She has regained some weight and is interested in revision.     Consult weight weight: 335 lbs  Lowest weight: 140 lbs (within 1 year)  Weight started increasing mid 2018    Cut out fried foods, only eats lean meats  Grazing through the day- protein chips, veggies  Water primary drink, zero sugar sodas sometimes    4 cigarettes per day, minimal EtOH  Minimal exercising, staying active with work    No follow up with previous bariatric program since 2018    Past Medical History:   Diagnosis Date    ADHD (attention deficit hyperactivity disorder)     I was in elementary school    Anxiety     Chronic back pain     Depression 2007    Ill-defined condition     HPV    Irritable bowel syndrome 2014    Obesity 2007    Plantar fasciitis     PUD (peptic ulcer disease)     Spondylosis     UTI (urinary tract infection)        Past Surgical History:   Procedure Laterality Date    APPENDECTOMY       SECTION      GASTRIC BYPASS SURGERY  2017    LIPOMA RESECTION      OTHER SURGICAL HISTORY      plantar faciitis  repair    SHOULDER ARTHROSCOPY      cartilage repair    TONSILLECTOMY         Family History   Problem Relation Age of Onset    Hypertension Father     Diabetes Father     High Blood Pressure Father     Arthritis Mother     Depression Mother     Cancer Maternal Grandfather         Prostate    Asthma Maternal Grandmother     Heart Attack Paternal Grandfather     Miscarriages / Stillbirths Sister        Social History     Tobacco Use    Smoking status: Some Days     Current packs/day: 0.00

## 2024-01-23 ASSESSMENT — ENCOUNTER SYMPTOMS
NAUSEA: 0
COUGH: 0
SHORTNESS OF BREATH: 0
BACK PAIN: 0
EYE REDNESS: 0
WHEEZING: 0
VOMITING: 0
SORE THROAT: 0
ABDOMINAL PAIN: 0

## 2024-06-18 NOTE — PROGRESS NOTES
Chantal Granger MD at bedside assessing patient patient may have clear liquid diet Skin normal color for race, warm, dry and intact. No evidence of rash.

## 2024-06-20 NOTE — TELEPHONE ENCOUNTER
Called patient. Did not go to ER yesterday after she refused to present for ongoing nausea and vomiting. \"I feel a little better today. \" Has drunk one glass of water in last 24 hours. Voice is weak and tired. She is concerned that we will admit her to hospital because she has no one to care for her kids. I explained to her that we are trying to keep her OUT of the hospital but that if she will not come to us for care, we will not be able to do that. I explained that if she is admitted to hospital and there is no one to care for her kids that the hospital will be forced to call CPS for foster care placement. I explained that our goals are to treat her as an outpatient but that we cannot do that if she will not come in for treatment. I explained that her refusals are actually increasing the likelihood she will end up in the hospital, not decreasing the likelihood. She has now agreed to find transportation for [facility] [address] therapy and an office visit today. We have contacted [facility] about this plan.
- - -

## 2024-08-26 ENCOUNTER — HOSPITAL ENCOUNTER (EMERGENCY)
Facility: HOSPITAL | Age: 39
Discharge: HOME | End: 2024-08-26
Attending: EMERGENCY MEDICINE
Payer: COMMERCIAL

## 2024-08-26 ENCOUNTER — APPOINTMENT (OUTPATIENT)
Facility: HOSPITAL | Age: 39
End: 2024-08-26
Payer: COMMERCIAL

## 2024-08-26 VITALS
SYSTOLIC BLOOD PRESSURE: 143 MMHG | OXYGEN SATURATION: 97 % | BODY MASS INDEX: 37.1 KG/M2 | HEIGHT: 71 IN | HEART RATE: 84 BPM | WEIGHT: 265 LBS | RESPIRATION RATE: 20 BRPM | TEMPERATURE: 98.3 F | DIASTOLIC BLOOD PRESSURE: 95 MMHG

## 2024-08-26 DIAGNOSIS — R07.9 CHEST PAIN, UNSPECIFIED TYPE: ICD-10-CM

## 2024-08-26 DIAGNOSIS — I26.94 MULTIPLE SUBSEGMENTAL PULMONARY EMBOLI WITHOUT ACUTE COR PULMONALE (HCC): Primary | ICD-10-CM

## 2024-08-26 DIAGNOSIS — J40 BRONCHITIS: ICD-10-CM

## 2024-08-26 LAB
ALBUMIN SERPL-MCNC: 3.1 G/DL (ref 3.5–5)
ALBUMIN/GLOB SERPL: 0.9 (ref 1.1–2.2)
ALP SERPL-CCNC: 92 U/L (ref 45–117)
ALT SERPL-CCNC: 27 U/L (ref 12–78)
ANION GAP SERPL CALC-SCNC: 6 MMOL/L (ref 5–15)
AST SERPL-CCNC: 22 U/L (ref 15–37)
BASOPHILS # BLD: 0.1 K/UL (ref 0–0.1)
BASOPHILS NFR BLD: 1 % (ref 0–1)
BILIRUB SERPL-MCNC: 0.4 MG/DL (ref 0.2–1)
BUN SERPL-MCNC: 7 MG/DL (ref 6–20)
BUN/CREAT SERPL: 14 (ref 12–20)
CALCIUM SERPL-MCNC: 9 MG/DL (ref 8.5–10.1)
CHLORIDE SERPL-SCNC: 109 MMOL/L (ref 97–108)
CO2 SERPL-SCNC: 27 MMOL/L (ref 21–32)
COMMENT:: NORMAL
CREAT SERPL-MCNC: 0.5 MG/DL (ref 0.55–1.02)
D DIMER PPP FEU-MCNC: 0.74 MG/L FEU (ref 0–0.65)
DIFFERENTIAL METHOD BLD: ABNORMAL
EOSINOPHIL # BLD: 0.2 K/UL (ref 0–0.4)
EOSINOPHIL NFR BLD: 2 % (ref 0–7)
ERYTHROCYTE [DISTWIDTH] IN BLOOD BY AUTOMATED COUNT: 16 % (ref 11.5–14.5)
FLUAV RNA SPEC QL NAA+PROBE: NOT DETECTED
FLUBV RNA SPEC QL NAA+PROBE: NOT DETECTED
GLOBULIN SER CALC-MCNC: 3.5 G/DL (ref 2–4)
GLUCOSE SERPL-MCNC: 100 MG/DL (ref 65–100)
HCG UR QL: NEGATIVE
HCT VFR BLD AUTO: 40.2 % (ref 35–47)
HGB BLD-MCNC: 13.4 G/DL (ref 11.5–16)
IMM GRANULOCYTES # BLD AUTO: 0.1 K/UL (ref 0–0.04)
IMM GRANULOCYTES NFR BLD AUTO: 1 % (ref 0–0.5)
LYMPHOCYTES # BLD: 1.9 K/UL (ref 0.8–3.5)
LYMPHOCYTES NFR BLD: 19 % (ref 12–49)
MCH RBC QN AUTO: 35.4 PG (ref 26–34)
MCHC RBC AUTO-ENTMCNC: 33.3 G/DL (ref 30–36.5)
MCV RBC AUTO: 106.3 FL (ref 80–99)
MONOCYTES # BLD: 0.4 K/UL (ref 0–1)
MONOCYTES NFR BLD: 4 % (ref 5–13)
NEUTS SEG # BLD: 7.4 K/UL (ref 1.8–8)
NEUTS SEG NFR BLD: 73 % (ref 32–75)
NRBC # BLD: 0 K/UL (ref 0–0.01)
NRBC BLD-RTO: 0 PER 100 WBC
PLATELET # BLD AUTO: 231 K/UL (ref 150–400)
PMV BLD AUTO: 11.1 FL (ref 8.9–12.9)
POTASSIUM SERPL-SCNC: 3.4 MMOL/L (ref 3.5–5.1)
PROT SERPL-MCNC: 6.6 G/DL (ref 6.4–8.2)
RBC # BLD AUTO: 3.78 M/UL (ref 3.8–5.2)
SARS-COV-2 RNA RESP QL NAA+PROBE: NOT DETECTED
SODIUM SERPL-SCNC: 142 MMOL/L (ref 136–145)
SOURCE: NORMAL
SPECIMEN HOLD: NORMAL
TROPONIN I SERPL HS-MCNC: <4 NG/L (ref 0–51)
WBC # BLD AUTO: 10 K/UL (ref 3.6–11)

## 2024-08-26 PROCEDURE — 85379 FIBRIN DEGRADATION QUANT: CPT

## 2024-08-26 PROCEDURE — 80053 COMPREHEN METABOLIC PANEL: CPT

## 2024-08-26 PROCEDURE — 71046 X-RAY EXAM CHEST 2 VIEWS: CPT

## 2024-08-26 PROCEDURE — 94761 N-INVAS EAR/PLS OXIMETRY MLT: CPT

## 2024-08-26 PROCEDURE — 6360000004 HC RX CONTRAST MEDICATION: Performed by: EMERGENCY MEDICINE

## 2024-08-26 PROCEDURE — 87636 SARSCOV2 & INF A&B AMP PRB: CPT

## 2024-08-26 PROCEDURE — 71275 CT ANGIOGRAPHY CHEST: CPT

## 2024-08-26 PROCEDURE — 84484 ASSAY OF TROPONIN QUANT: CPT

## 2024-08-26 PROCEDURE — 6370000000 HC RX 637 (ALT 250 FOR IP): Performed by: EMERGENCY MEDICINE

## 2024-08-26 PROCEDURE — 99285 EMERGENCY DEPT VISIT HI MDM: CPT

## 2024-08-26 PROCEDURE — 85025 COMPLETE CBC W/AUTO DIFF WBC: CPT

## 2024-08-26 PROCEDURE — 36415 COLL VENOUS BLD VENIPUNCTURE: CPT

## 2024-08-26 PROCEDURE — 93005 ELECTROCARDIOGRAM TRACING: CPT | Performed by: EMERGENCY MEDICINE

## 2024-08-26 PROCEDURE — 81025 URINE PREGNANCY TEST: CPT

## 2024-08-26 RX ORDER — IOPAMIDOL 755 MG/ML
100 INJECTION, SOLUTION INTRAVASCULAR
Status: COMPLETED | OUTPATIENT
Start: 2024-08-26 | End: 2024-08-26

## 2024-08-26 RX ORDER — FLUCONAZOLE 150 MG/1
150 TABLET ORAL ONCE
Qty: 1 TABLET | Refills: 1 | Status: SHIPPED | OUTPATIENT
Start: 2024-08-26 | End: 2024-08-26

## 2024-08-26 RX ORDER — AZITHROMYCIN 250 MG/1
TABLET, FILM COATED ORAL
Qty: 6 TABLET | Refills: 0 | Status: SHIPPED | OUTPATIENT
Start: 2024-08-26 | End: 2024-09-05

## 2024-08-26 RX ORDER — OXYCODONE AND ACETAMINOPHEN 5; 325 MG/1; MG/1
1 TABLET ORAL
Status: DISCONTINUED | OUTPATIENT
Start: 2024-08-26 | End: 2024-08-26 | Stop reason: HOSPADM

## 2024-08-26 RX ADMIN — APIXABAN 10 MG: 5 TABLET, FILM COATED ORAL at 17:42

## 2024-08-26 RX ADMIN — IOPAMIDOL 100 ML: 755 INJECTION, SOLUTION INTRAVENOUS at 16:39

## 2024-08-26 ASSESSMENT — ENCOUNTER SYMPTOMS
COUGH: 0
EYE PAIN: 0
ABDOMINAL PAIN: 0
EYE DISCHARGE: 0
FACIAL SWELLING: 0
VOMITING: 0
SHORTNESS OF BREATH: 0
BACK PAIN: 0
CHEST TIGHTNESS: 0
TROUBLE SWALLOWING: 0
DIARRHEA: 0
NAUSEA: 0
SORE THROAT: 0
ORTHOPNEA: 0

## 2024-08-26 ASSESSMENT — PAIN DESCRIPTION - LOCATION: LOCATION: CHEST

## 2024-08-26 ASSESSMENT — PAIN - FUNCTIONAL ASSESSMENT: PAIN_FUNCTIONAL_ASSESSMENT: 0-10

## 2024-08-26 ASSESSMENT — PAIN DESCRIPTION - DESCRIPTORS: DESCRIPTORS: SHARP

## 2024-08-26 ASSESSMENT — PAIN SCALES - GENERAL: PAINLEVEL_OUTOF10: 6

## 2024-08-26 NOTE — ED TRIAGE NOTES
Pt sts 3 days ago she started to have  R chest pain and SOB with cough that is productive, a soar throat and HA    PT phlem is brown and yellow     PT denies n/v/d    Pt took advil and mucinex with little relief

## 2024-08-26 NOTE — ED PROVIDER NOTES
normal. No respiratory distress.      Breath sounds: No stridor. No wheezing.   Abdominal:      General: Abdomen is flat. Bowel sounds are normal. There is no distension.      Palpations: Abdomen is soft.      Tenderness: There is no abdominal tenderness.   Musculoskeletal:         General: Normal range of motion.      Cervical back: Normal range of motion. No rigidity.   Lymphadenopathy:      Cervical: No cervical adenopathy.   Skin:     General: Skin is warm and dry.   Neurological:      General: No focal deficit present.      Mental Status: She is alert and oriented to person, place, and time. Mental status is at baseline.   Psychiatric:         Mood and Affect: Mood normal.         Behavior: Behavior normal.         DIAGNOSTIC RESULTS     EKG: All EKG's are interpreted by the Emergency Department Physician who either signs or Co-signs this chart in the absence of a cardiologist.        RADIOLOGY:   Non-plain film images such as CT, Ultrasound and MRI are read by the radiologist. Plain radiographic images are visualized and preliminarily interpreted by the emergency physician with the below findings:        Interpretation per the Radiologist below, if available at the time of this note:    CTA CHEST W WO CONTRAST PE Eval   Final Result   Acute PE, as described above.         Findings were communicated verbally to the ED at the time of interpretation.         Electronically signed by Sriram Brown MD      XR CHEST (2 VW)   Final Result   No acute cardiopulmonary abnormalities.      Electronically signed by TOM Zaragoza           LABS:  Labs Reviewed   D-DIMER, QUANTITATIVE - Abnormal; Notable for the following components:       Result Value    D-Dimer, Quant 0.74 (*)     All other components within normal limits   CBC WITH AUTO DIFFERENTIAL - Abnormal; Notable for the following components:    RBC 3.78 (*)     .3 (*)     MCH 35.4 (*)     RDW 16.0 (*)     Monocytes % 4 (*)     Immature Granulocytes % 1 (*)      Immature Granulocytes Absolute 0.1 (*)     All other components within normal limits   COMPREHENSIVE METABOLIC PANEL - Abnormal; Notable for the following components:    Potassium 3.4 (*)     Chloride 109 (*)     Creatinine 0.50 (*)     Albumin 3.1 (*)     Albumin/Globulin Ratio 0.9 (*)     All other components within normal limits   COVID-19 & INFLUENZA COMBO   EXTRA TUBES HOLD   TROPONIN   POC PREGNANCY UR-QUAL   POC PREGNANCY UR-QUAL       All other labs were within normal range or not returned as of this dictation.    EMERGENCY DEPARTMENT COURSE and DIFFERENTIAL DIAGNOSIS/MDM:   Vitals:    Vitals:    08/26/24 1530 08/26/24 1600 08/26/24 1615 08/26/24 1630   BP: (!) 134/97 (!) 143/91 (!) 152/99 (!) 143/95   Pulse:       Resp:       Temp:       TempSrc:       SpO2: 96% 96% 97% 97%   Weight:       Height:               Medical Decision Making  39 yof with cough/ congestion/ right chest pain. Poss fever.  Differential includes COVID versus influenza versus bronchitis or pneumonia.  Also acute coronary syndrome or PE.  Will do D-dimer chest x-ray and EKG.   as well as troponin.    Amount and/or Complexity of Data Reviewed  Labs: ordered.  Radiology: ordered.  ECG/medicine tests: ordered.    Risk  Prescription drug management.            REASSESSMENT     ED Course as of 08/27/24 1530   Mon Aug 26, 2024   1522 EKG 12 Lead  Normal sinus rhythm, rate 90. Normal axis. Nonspecific T wave changes.  No STEMI.  Borderline prolonged QT interval. [AS]   1736 Patient has a slight elevation of D-dimer at 0.74.  CTA of the chest shows multiple subsegmental PEs on the right side.  Mostly are nonocclusive.  Will start Eliquis 10 mg now and prescribed 1 week of 10 mg twice a day followed by 5 mg twice a day.  She will follow-up with pulmonology. [VM]      ED Course User Index  [AS] Beto Lane MD  [VM] Jared Guallpa MD           CONSULTS:  None    PROCEDURES:  Unless otherwise noted below, none     Procedures      FINAL

## 2024-08-27 LAB
EKG ATRIAL RATE: 90 BPM
EKG DIAGNOSIS: NORMAL
EKG P AXIS: 51 DEGREES
EKG P-R INTERVAL: 134 MS
EKG Q-T INTERVAL: 398 MS
EKG QRS DURATION: 104 MS
EKG QTC CALCULATION (BAZETT): 486 MS
EKG R AXIS: 74 DEGREES
EKG T AXIS: 53 DEGREES
EKG VENTRICULAR RATE: 90 BPM

## 2024-08-27 PROCEDURE — 93010 ELECTROCARDIOGRAM REPORT: CPT | Performed by: SPECIALIST

## 2024-11-26 ENCOUNTER — APPOINTMENT (OUTPATIENT)
Facility: HOSPITAL | Age: 39
End: 2024-11-26
Payer: COMMERCIAL

## 2024-11-26 ENCOUNTER — HOSPITAL ENCOUNTER (EMERGENCY)
Facility: HOSPITAL | Age: 39
Discharge: HOME | End: 2024-11-26
Attending: EMERGENCY MEDICINE
Payer: COMMERCIAL

## 2024-11-26 VITALS
BODY MASS INDEX: 39.04 KG/M2 | TEMPERATURE: 97.8 F | HEART RATE: 87 BPM | DIASTOLIC BLOOD PRESSURE: 87 MMHG | SYSTOLIC BLOOD PRESSURE: 143 MMHG | RESPIRATION RATE: 18 BRPM | HEIGHT: 71 IN | OXYGEN SATURATION: 99 % | WEIGHT: 278.88 LBS

## 2024-11-26 DIAGNOSIS — B34.9 ACUTE VIRAL SYNDROME: Primary | ICD-10-CM

## 2024-11-26 LAB
FLUAV RNA SPEC QL NAA+PROBE: NOT DETECTED
FLUBV RNA SPEC QL NAA+PROBE: NOT DETECTED
S PYO DNA THROAT QL NAA+PROBE: NOT DETECTED
SARS-COV-2 RNA RESP QL NAA+PROBE: NOT DETECTED
SOURCE: NORMAL

## 2024-11-26 PROCEDURE — 99284 EMERGENCY DEPT VISIT MOD MDM: CPT

## 2024-11-26 PROCEDURE — 87636 SARSCOV2 & INF A&B AMP PRB: CPT

## 2024-11-26 PROCEDURE — 87651 STREP A DNA AMP PROBE: CPT

## 2024-11-26 PROCEDURE — 71046 X-RAY EXAM CHEST 2 VIEWS: CPT

## 2024-11-26 ASSESSMENT — ENCOUNTER SYMPTOMS
TROUBLE SWALLOWING: 0
EYE PAIN: 0
VOMITING: 0
NAUSEA: 0
ABDOMINAL PAIN: 0
COLOR CHANGE: 0
EYE REDNESS: 0
SHORTNESS OF BREATH: 0
ABDOMINAL DISTENTION: 0

## 2024-11-26 ASSESSMENT — PAIN SCALES - GENERAL: PAINLEVEL_OUTOF10: 4

## 2024-11-26 ASSESSMENT — PAIN - FUNCTIONAL ASSESSMENT: PAIN_FUNCTIONAL_ASSESSMENT: 0-10

## 2024-11-26 NOTE — ED TRIAGE NOTES
Pt presents to ED with c/o body aches, cough, sinus congestion, sore throat, and intermittent fevers x3 days. Pt reports tactile fever. Pt denies any CP and SOB, denies any n/v/d. Pt denies taking any medications for her current sx.

## 2024-11-26 NOTE — ED PROVIDER NOTES
Cervical back: Normal range of motion and neck supple. No tenderness.   Skin:     General: Skin is warm and dry.      Capillary Refill: Capillary refill takes less than 2 seconds.      Findings: No erythema.   Neurological:      General: No focal deficit present.      Mental Status: She is alert and oriented to person, place, and time.      Sensory: No sensory deficit.      Motor: No weakness.   Psychiatric:         Mood and Affect: Mood normal.         Behavior: Behavior normal.         Thought Content: Thought content normal.         Judgment: Judgment normal.             EMERGENCY DEPARTMENT COURSE and DIFFERENTIAL DIAGNOSIS/MDM:   Vitals:    Vitals:    11/26/24 1422   BP: (!) 143/87   Pulse: 87   Resp: 18   Temp: 97.8 °F (36.6 °C)   TempSrc: Oral   SpO2: 99%   Weight: 126.5 kg (278 lb 14.1 oz)   Height: 1.803 m (5' 11\")         Medical Decision Making  Differential diagnosis includes influenza, COVID-19, pneumonia and others.    History of Present Illness:  The patient is a 39-year-old female who presents to the ED with complaints of body aches, cough, sinus congestion, sore throat, and intermittent fevers for the past 3 days. She reports experiencing tactile fever but denies any chest pain (CP), shortness of breath (SOB), nausea, vomiting, or diarrhea. The patient has not taken any medications for her current symptoms.    After physical examination imaging and laboratory data were obtained.  COVID-19 is negative.  Influenza A and influenza B are negative.  Strep not detected.  Chest x-ray is negative.  This is viral in nature discharged home and follow-up with PCP.  Tylenol alternating with Motrin for body aches and pains.  Patient is in agreement with plan of care and will return with worsening symptoms.  She stable for discharge.    Any available vitals, labs, images, nursing notes, medications, allergies, PMH, PSH and/or previous records in the chart were reviewed. All of these were considered in the medical

## 2024-11-28 ASSESSMENT — ENCOUNTER SYMPTOMS
SORE THROAT: 1
COUGH: 1
SINUS PAIN: 1
SINUS PRESSURE: 1

## 2025-03-12 ENCOUNTER — APPOINTMENT (OUTPATIENT)
Facility: HOSPITAL | Age: 40
End: 2025-03-12
Payer: COMMERCIAL

## 2025-03-12 ENCOUNTER — HOSPITAL ENCOUNTER (EMERGENCY)
Facility: HOSPITAL | Age: 40
Discharge: HOME | End: 2025-03-12
Attending: EMERGENCY MEDICINE
Payer: COMMERCIAL

## 2025-03-12 VITALS
OXYGEN SATURATION: 98 % | TEMPERATURE: 97.7 F | WEIGHT: 278 LBS | BODY MASS INDEX: 38.92 KG/M2 | RESPIRATION RATE: 16 BRPM | HEART RATE: 77 BPM | SYSTOLIC BLOOD PRESSURE: 131 MMHG | HEIGHT: 71 IN | DIASTOLIC BLOOD PRESSURE: 76 MMHG

## 2025-03-12 DIAGNOSIS — R10.9 ABDOMINAL PAIN, UNSPECIFIED ABDOMINAL LOCATION: Primary | ICD-10-CM

## 2025-03-12 LAB
ALBUMIN SERPL-MCNC: 3.3 G/DL (ref 3.5–5)
ALBUMIN/GLOB SERPL: 1 (ref 1.1–2.2)
ALP SERPL-CCNC: 75 U/L (ref 45–117)
ALT SERPL-CCNC: 24 U/L (ref 12–78)
ANION GAP SERPL CALC-SCNC: 9 MMOL/L (ref 2–12)
APPEARANCE UR: CLEAR
AST SERPL-CCNC: 21 U/L (ref 15–37)
BACTERIA URNS QL MICRO: NEGATIVE /HPF
BASOPHILS # BLD: 0.03 K/UL (ref 0–0.1)
BASOPHILS NFR BLD: 0.4 % (ref 0–1)
BILIRUB SERPL-MCNC: 0.4 MG/DL (ref 0.2–1)
BILIRUB UR QL: NEGATIVE
BUN SERPL-MCNC: 17 MG/DL (ref 6–20)
BUN/CREAT SERPL: 21 (ref 12–20)
CALCIUM SERPL-MCNC: 8.8 MG/DL (ref 8.5–10.1)
CHLORIDE SERPL-SCNC: 110 MMOL/L (ref 97–108)
CO2 SERPL-SCNC: 22 MMOL/L (ref 21–32)
COLOR UR: ABNORMAL
CREAT SERPL-MCNC: 0.81 MG/DL (ref 0.55–1.02)
DIFFERENTIAL METHOD BLD: ABNORMAL
EOSINOPHIL # BLD: 0.12 K/UL (ref 0–0.4)
EOSINOPHIL NFR BLD: 1.6 % (ref 0–7)
EPITH CASTS URNS QL MICRO: ABNORMAL /LPF
ERYTHROCYTE [DISTWIDTH] IN BLOOD BY AUTOMATED COUNT: 17.3 % (ref 11.5–14.5)
GLOBULIN SER CALC-MCNC: 3.4 G/DL (ref 2–4)
GLUCOSE SERPL-MCNC: 108 MG/DL (ref 65–100)
GLUCOSE UR STRIP.AUTO-MCNC: NEGATIVE MG/DL
HCG UR QL: NEGATIVE
HCT VFR BLD AUTO: 39.7 % (ref 35–47)
HGB BLD-MCNC: 13.4 G/DL (ref 11.5–16)
HGB UR QL STRIP: ABNORMAL
IMM GRANULOCYTES # BLD AUTO: 0.02 K/UL (ref 0–0.04)
IMM GRANULOCYTES NFR BLD AUTO: 0.3 % (ref 0–0.5)
KETONES UR QL STRIP.AUTO: ABNORMAL MG/DL
LEUKOCYTE ESTERASE UR QL STRIP.AUTO: ABNORMAL
LYMPHOCYTES # BLD: 2.97 K/UL (ref 0.8–3.5)
LYMPHOCYTES NFR BLD: 38.5 % (ref 12–49)
MCH RBC QN AUTO: 33 PG (ref 26–34)
MCHC RBC AUTO-ENTMCNC: 33.8 G/DL (ref 30–36.5)
MCV RBC AUTO: 97.8 FL (ref 80–99)
MONOCYTES # BLD: 0.4 K/UL (ref 0–1)
MONOCYTES NFR BLD: 5.2 % (ref 5–13)
NEUTS SEG # BLD: 4.17 K/UL (ref 1.8–8)
NEUTS SEG NFR BLD: 54 % (ref 32–75)
NITRITE UR QL STRIP.AUTO: NEGATIVE
NRBC # BLD: 0 K/UL (ref 0–0.01)
NRBC BLD-RTO: 0 PER 100 WBC
PH UR STRIP: 5.5 (ref 5–8)
PLATELET # BLD AUTO: 278 K/UL (ref 150–400)
PMV BLD AUTO: 10.5 FL (ref 8.9–12.9)
POTASSIUM SERPL-SCNC: 4.2 MMOL/L (ref 3.5–5.1)
PROT SERPL-MCNC: 6.7 G/DL (ref 6.4–8.2)
PROT UR STRIP-MCNC: ABNORMAL MG/DL
RBC # BLD AUTO: 4.06 M/UL (ref 3.8–5.2)
RBC #/AREA URNS HPF: ABNORMAL /HPF (ref 0–5)
SODIUM SERPL-SCNC: 141 MMOL/L (ref 136–145)
SP GR UR REFRACTOMETRY: 1.03 (ref 1–1.03)
SPECIMEN HOLD: NORMAL
UROBILINOGEN UR QL STRIP.AUTO: 0.2 EU/DL (ref 0.2–1)
WBC # BLD AUTO: 7.7 K/UL (ref 3.6–11)
WBC URNS QL MICRO: ABNORMAL /HPF (ref 0–4)

## 2025-03-12 PROCEDURE — 96361 HYDRATE IV INFUSION ADD-ON: CPT

## 2025-03-12 PROCEDURE — 96375 TX/PRO/DX INJ NEW DRUG ADDON: CPT

## 2025-03-12 PROCEDURE — 80053 COMPREHEN METABOLIC PANEL: CPT

## 2025-03-12 PROCEDURE — 6360000004 HC RX CONTRAST MEDICATION: Performed by: NURSE PRACTITIONER

## 2025-03-12 PROCEDURE — 81001 URINALYSIS AUTO W/SCOPE: CPT

## 2025-03-12 PROCEDURE — 81025 URINE PREGNANCY TEST: CPT

## 2025-03-12 PROCEDURE — 85025 COMPLETE CBC W/AUTO DIFF WBC: CPT

## 2025-03-12 PROCEDURE — 96374 THER/PROPH/DIAG INJ IV PUSH: CPT

## 2025-03-12 PROCEDURE — 74177 CT ABD & PELVIS W/CONTRAST: CPT

## 2025-03-12 PROCEDURE — 6370000000 HC RX 637 (ALT 250 FOR IP): Performed by: NURSE PRACTITIONER

## 2025-03-12 PROCEDURE — 2500000003 HC RX 250 WO HCPCS: Performed by: NURSE PRACTITIONER

## 2025-03-12 PROCEDURE — 2580000003 HC RX 258: Performed by: NURSE PRACTITIONER

## 2025-03-12 PROCEDURE — 99285 EMERGENCY DEPT VISIT HI MDM: CPT

## 2025-03-12 PROCEDURE — 6360000002 HC RX W HCPCS: Performed by: NURSE PRACTITIONER

## 2025-03-12 PROCEDURE — 36415 COLL VENOUS BLD VENIPUNCTURE: CPT

## 2025-03-12 RX ORDER — DICYCLOMINE HCL 20 MG
20 TABLET ORAL ONCE
Status: DISCONTINUED | OUTPATIENT
Start: 2025-03-12 | End: 2025-03-12

## 2025-03-12 RX ORDER — ONDANSETRON 2 MG/ML
4 INJECTION INTRAMUSCULAR; INTRAVENOUS ONCE
Status: COMPLETED | OUTPATIENT
Start: 2025-03-12 | End: 2025-03-12

## 2025-03-12 RX ORDER — DICYCLOMINE HYDROCHLORIDE 10 MG/1
10 CAPSULE ORAL ONCE
Qty: 1 CAPSULE | Refills: 0 | Status: SHIPPED | OUTPATIENT
Start: 2025-03-12 | End: 2025-03-12

## 2025-03-12 RX ORDER — IOPAMIDOL 755 MG/ML
100 INJECTION, SOLUTION INTRAVASCULAR
Status: COMPLETED | OUTPATIENT
Start: 2025-03-12 | End: 2025-03-12

## 2025-03-12 RX ORDER — MORPHINE SULFATE 2 MG/ML
2 INJECTION, SOLUTION INTRAMUSCULAR; INTRAVENOUS
Status: COMPLETED | OUTPATIENT
Start: 2025-03-12 | End: 2025-03-12

## 2025-03-12 RX ORDER — DICYCLOMINE HYDROCHLORIDE 10 MG/1
10 CAPSULE ORAL EVERY 6 HOURS PRN
Qty: 20 CAPSULE | Refills: 0 | Status: SHIPPED | OUTPATIENT
Start: 2025-03-12

## 2025-03-12 RX ORDER — 0.9 % SODIUM CHLORIDE 0.9 %
1000 INTRAVENOUS SOLUTION INTRAVENOUS ONCE
Status: COMPLETED | OUTPATIENT
Start: 2025-03-12 | End: 2025-03-12

## 2025-03-12 RX ORDER — DICYCLOMINE HCL 20 MG
20 TABLET ORAL ONCE
Status: COMPLETED | OUTPATIENT
Start: 2025-03-12 | End: 2025-03-12

## 2025-03-12 RX ADMIN — ONDANSETRON 4 MG: 2 INJECTION, SOLUTION INTRAMUSCULAR; INTRAVENOUS at 13:38

## 2025-03-12 RX ADMIN — MORPHINE SULFATE 2 MG: 2 INJECTION, SOLUTION INTRAMUSCULAR; INTRAVENOUS at 13:39

## 2025-03-12 RX ADMIN — DICYCLOMINE HYDROCHLORIDE 20 MG: 20 TABLET ORAL at 15:44

## 2025-03-12 RX ADMIN — SODIUM CHLORIDE 1000 ML: 0.9 INJECTION, SOLUTION INTRAVENOUS at 13:37

## 2025-03-12 RX ADMIN — IOPAMIDOL 100 ML: 755 INJECTION, SOLUTION INTRAVENOUS at 13:44

## 2025-03-12 ASSESSMENT — PAIN - FUNCTIONAL ASSESSMENT: PAIN_FUNCTIONAL_ASSESSMENT: 0-10

## 2025-03-12 ASSESSMENT — ENCOUNTER SYMPTOMS
EYE PAIN: 0
COUGH: 0
VOMITING: 0
SINUS PAIN: 0
SHORTNESS OF BREATH: 0
NAUSEA: 1
ABDOMINAL PAIN: 1
ABDOMINAL DISTENTION: 0
SINUS PRESSURE: 0
EYE REDNESS: 0
COLOR CHANGE: 0

## 2025-03-12 ASSESSMENT — PAIN SCALES - GENERAL
PAINLEVEL_OUTOF10: 5
PAINLEVEL_OUTOF10: 6
PAINLEVEL_OUTOF10: 7

## 2025-03-12 ASSESSMENT — PAIN DESCRIPTION - LOCATION: LOCATION: ABDOMEN

## 2025-03-12 ASSESSMENT — PAIN DESCRIPTION - DESCRIPTORS: DESCRIPTORS: CRAMPING;SHOOTING

## 2025-03-12 NOTE — ED TRIAGE NOTES
Patient ambulatory to ER triage for complaints of generalize abdominal pain    Endorses N/V.     Denies urinary symptoms.     Reports she is currently being treated for pneumonia with doxycycline.     Ashlyn FORREST in triage to assess patient.

## 2025-03-12 NOTE — ED PROVIDER NOTES
[facility][note 3] EMERGENCY DEPARTMENT  EMERGENCY DEPARTMENT ENCOUNTER      Pt Name: Xena Loaiza  MRN: [id number]  Birthdate 1985  Date of evaluation: 3/12/2025  Provider: SUJIT Alfaro NP      HISTORY OF PRESENT ILLNESS      Chief complaint:  Severe abdominal pain      Past Medical History:  : ADHD (attention deficit hyperactivity disorder)      Comment:  I was in elementary school  No date: Anxiety  2014: Chronic back pain  2007: Depression  No date: Ill-defined condition      Comment:  HPV  2014: Irritable bowel syndrome  2007: Obesity  No date: Plantar fasciitis  No date: PUD (peptic ulcer disease)  No date: Spondylosis  No date: UTI (urinary tract infection)  Past Surgical History:  No date: APPENDECTOMY  No date:  SECTION  2017: GASTRIC BYPASS SURGERY  No date: LIPOMA RESECTION  No date: OTHER SURGICAL HISTORY      Comment:  plantar faciitis  repair  No date: SHOULDER ARTHROSCOPY      Comment:  cartilage repair  No date: TONSILLECTOMY      The history is provided by the patient.           Nursing Notes were reviewed.    REVIEW OF SYSTEMS         Review of Systems   Constitutional:  Positive for chills. Negative for appetite change, diaphoresis and fatigue.   HENT:  Negative for congestion, sinus pressure and sinus pain.    Eyes:  Negative for pain and redness.   Respiratory:  Negative for cough and shortness of breath.    Cardiovascular:  Negative for chest pain and palpitations.   Gastrointestinal:  Positive for abdominal pain and nausea. Negative for abdominal distention and vomiting.   Genitourinary:  Negative for difficulty urinating, frequency and urgency.   Musculoskeletal:  Negative for arthralgias, neck pain and neck stiffness.   Skin:  Negative for color change, pallor, rash and wound.   Neurological:  Negative for dizziness, speech difficulty and headaches.   Hematological:  Does not bruise/bleed easily.   Psychiatric/Behavioral:  The patient is

## 2025-06-30 ENCOUNTER — TELEPHONE (OUTPATIENT)
Dept: PRIMARY CARE CLINIC | Facility: CLINIC | Age: 40
End: 2025-06-30

## 2025-06-30 NOTE — TELEPHONE ENCOUNTER
----- Message from Chelsea GAMBLE sent at 6/30/2025 12:42 PM EDT -----  Regarding: [facility] Appointment Request  [facility] Appointment Request    Patient needs appointment for [facility] Appointment Type: New Patient.    Patient Requested Dates(s): as soon as possible   Patient Requested Time: afternoon   Provider Name: any available provider     Reason for Appointment Request: New Patient - Available appointments did not meet patient need/new patient,establish care and hospital follow up   --------------------------------------------------------------------------------------------------------------------------    Relationship to Patient: Self     Call Back Information: OK to leave message on voicemail  Preferred Call Back Number: Phone [phone]

## 2025-07-15 ENCOUNTER — TELEPHONE (OUTPATIENT)
Facility: CLINIC | Age: 40
End: 2025-07-15

## 2025-07-15 NOTE — TELEPHONE ENCOUNTER
----- Message from Oskar CALDERON sent at 7/15/2025 12:57 PM EDT -----  Regarding: [facility] Appointment Request  [facility] Appointment Request    Patient needs appointment for [facility] Appointment Type: New to Provider.    Patient Requested Dates(s): Soonest Availability   Patient Requested Time: Soonest Availability   Provider Name: Beto Patiño MD    Reason for Appointment Request: New Patient - No appointments available during search. New to Provider, Establish Care, discuss a medication management  --------------------------------------------------------------------------------------------------------------------------    Relationship to Patient: Self     Call Back Information: OK to leave message on voicemail  Preferred Call Back Number: Phone [phone]

## (undated) DEVICE — Device

## (undated) DEVICE — RELOAD STPL L60MM H1-2.6MM MESENTERY THN TISS WHT 6 ROW

## (undated) DEVICE — SOLUTION IRRIG 1000ML H2O STRL BLT

## (undated) DEVICE — SOLUTION LACTATED RINGERS INJECTION USP

## (undated) DEVICE — TRAY CATH OD16FR SIL URIN M STATLOK STBL DEV SURSTP

## (undated) DEVICE — DERMABOND SKIN ADH 0.7ML -- DERMABOND ADVANCED 12/BX

## (undated) DEVICE — GLOVE SURG SZ 7.5 L11.73IN FNGR THK9.8MIL STRW LTX POLYMER

## (undated) DEVICE — SUTURE VCRL SZ 2-0 L54IN ABSRB VLT W/O NDL POLYGLACTIN 910 J618H

## (undated) DEVICE — MEDI-VAC NON-CONDUCTIVE SUCTION TUBING: Brand: CARDINAL HEALTH

## (undated) DEVICE — RELOAD STPL L60MM H1.5-3.6MM REG TISS BLU GRIPPING SURF B

## (undated) DEVICE — KENDALL SCD EXPRESS SLEEVES, KNEE LENGTH, MEDIUM: Brand: KENDALL SCD

## (undated) DEVICE — REM POLYHESIVE ADULT PATIENT RETURN ELECTRODE: Brand: VALLEYLAB

## (undated) DEVICE — BASIN EMESIS 500CC ROSE 250/CS 60/PLT: Brand: MEDEGEN MEDICAL PRODUCTS, LLC

## (undated) DEVICE — SYR 50ML SLIP TIP NSAF LF STRL --

## (undated) DEVICE — BITE BLK ENDOSCP AD 54FR GRN POLYETH ENDOSCP W STRP SLD

## (undated) DEVICE — BLADELESS OPTICAL TROCAR WITH FIXATION CANNULA: Brand: VERSAONE

## (undated) DEVICE — PACK PROCEDURE SURG LAPAROSCOPY 17X7 MM BRTRC PRIMUS

## (undated) DEVICE — KIT COLON W/ 1.1OZ LUB AND 2 END

## (undated) DEVICE — SUTURE VCRL SZ 3-0 L27IN ABSRB VLT L26MM SH 1/2 CIR J316H

## (undated) DEVICE — SHEARS ENDOSCP L36CM DIA5MM ULTRASONIC CRV TIP W/ ADV

## (undated) DEVICE — STAPLER SKIN L440MM 32MM LNG 12 FIRING B FRM PWR + GRIPPING

## (undated) DEVICE — INTENDED FOR TISSUE SEPARATION, AND OTHER PROCEDURES THAT REQUIRE A SHARP SURGICAL BLADE TO PUNCTURE OR CUT.: Brand: BARD-PARKER SAFETY BLADES SIZE 15, STERILE

## (undated) DEVICE — 3M™ TRANSPORE™ TAPE 1527-3: Brand: 3M™ TRANSPORE™

## (undated) DEVICE — KENDALL 500 SERIES DIAPHORETIC FOAM MONITORING ELECTRODE - TEAR DROP SHAPE ( 30/PK): Brand: KENDALL

## (undated) DEVICE — SUT SLK 2-0SH 30IN BLK --

## (undated) DEVICE — SUTURE MCRYL SZ 4-0 L27IN ABSRB UD L24MM PS-1 3/8 CIR PRIM Y935H

## (undated) DEVICE — FLEX ADVANTAGE 1500CC: Brand: FLEX ADVANTAGE

## (undated) DEVICE — FLEX ADVANTAGE 3000CC: Brand: FLEX ADVANTAGE

## (undated) DEVICE — (D)DRSG BORD MPLX SACRUM 23X23 -- DISC BY MFR USE ITEM 340908

## (undated) DEVICE — TRUE CONTENT TO BE POPULATED AS PART OF REBRANDING: Brand: ARGYLE

## (undated) DEVICE — STAPLE INT BIOABSRB REINF FOR ETHICON FLX ENDOPATH 60 PWR +

## (undated) DEVICE — 3M™ BAIR PAWS FLEX™ WARMING GOWN, STANDARD, 20 PER CASE 81003: Brand: BAIR PAWS™

## (undated) DEVICE — BLADELESS OPTICAL TROCAR WITH FIXATION CANNULA: Brand: VERSAPORT